# Patient Record
Sex: MALE | Employment: UNEMPLOYED | URBAN - METROPOLITAN AREA
[De-identification: names, ages, dates, MRNs, and addresses within clinical notes are randomized per-mention and may not be internally consistent; named-entity substitution may affect disease eponyms.]

---

## 2021-01-01 ENCOUNTER — APPOINTMENT (OUTPATIENT)
Dept: RADIOLOGY | Facility: MEDICAL CENTER | Age: 41
DRG: 853 | End: 2021-01-01
Attending: INTERNAL MEDICINE
Payer: MEDICAID

## 2021-01-01 ENCOUNTER — APPOINTMENT (OUTPATIENT)
Dept: RADIOLOGY | Facility: MEDICAL CENTER | Age: 41
DRG: 853 | End: 2021-01-01
Attending: STUDENT IN AN ORGANIZED HEALTH CARE EDUCATION/TRAINING PROGRAM
Payer: MEDICAID

## 2021-01-01 ENCOUNTER — ANCILLARY PROCEDURE (OUTPATIENT)
Dept: CARDIAC CATH/INVASIVE PROCEDURES | Facility: MEDICAL CENTER | Age: 41
DRG: 853 | End: 2021-01-01
Attending: INTERNAL MEDICINE
Payer: MEDICAID

## 2021-01-01 ENCOUNTER — HOSPITAL ENCOUNTER (INPATIENT)
Facility: MEDICAL CENTER | Age: 41
LOS: 15 days | DRG: 853 | End: 2021-05-02
Attending: EMERGENCY MEDICINE | Admitting: EMERGENCY MEDICINE
Payer: MEDICAID

## 2021-01-01 VITALS
DIASTOLIC BLOOD PRESSURE: 47 MMHG | WEIGHT: 292.99 LBS | RESPIRATION RATE: 20 BRPM | HEART RATE: 76 BPM | BODY MASS INDEX: 41.95 KG/M2 | HEIGHT: 70 IN | SYSTOLIC BLOOD PRESSURE: 106 MMHG | OXYGEN SATURATION: 57 % | TEMPERATURE: 98.2 F

## 2021-01-01 DIAGNOSIS — J96.01 ACUTE RESPIRATORY FAILURE WITH HYPOXIA (HCC): ICD-10-CM

## 2021-01-01 DIAGNOSIS — J69.0 ASPIRATION PNEUMONITIS (HCC): ICD-10-CM

## 2021-01-01 DIAGNOSIS — K70.30 ALCOHOLIC CIRRHOSIS OF LIVER WITHOUT ASCITES (HCC): ICD-10-CM

## 2021-01-01 DIAGNOSIS — A41.9 SEPTIC SHOCK (HCC): ICD-10-CM

## 2021-01-01 DIAGNOSIS — K70.10 ALCOHOLIC HEPATITIS WITHOUT ASCITES: ICD-10-CM

## 2021-01-01 DIAGNOSIS — R65.21 SEPTIC SHOCK (HCC): ICD-10-CM

## 2021-01-01 LAB
ABO + RH BLD: NORMAL
ABO GROUP BLD: NORMAL
ALBUMIN SERPL BCP-MCNC: 2.6 G/DL (ref 3.2–4.9)
ALBUMIN SERPL BCP-MCNC: 2.7 G/DL (ref 3.2–4.9)
ALBUMIN SERPL BCP-MCNC: 2.8 G/DL (ref 3.2–4.9)
ALBUMIN SERPL BCP-MCNC: 2.8 G/DL (ref 3.2–4.9)
ALBUMIN SERPL BCP-MCNC: 2.9 G/DL (ref 3.2–4.9)
ALBUMIN SERPL BCP-MCNC: 3 G/DL (ref 3.2–4.9)
ALBUMIN SERPL BCP-MCNC: 3 G/DL (ref 3.2–4.9)
ALBUMIN SERPL BCP-MCNC: 3.1 G/DL (ref 3.2–4.9)
ALBUMIN SERPL BCP-MCNC: 3.1 G/DL (ref 3.2–4.9)
ALBUMIN SERPL BCP-MCNC: 3.4 G/DL (ref 3.2–4.9)
ALBUMIN/GLOB SERPL: 0.8 G/DL
ALBUMIN/GLOB SERPL: 0.8 G/DL
ALBUMIN/GLOB SERPL: 0.9 G/DL
ALBUMIN/GLOB SERPL: 1 G/DL
ALBUMIN/GLOB SERPL: 1.1 G/DL
ALBUMIN/GLOB SERPL: 1.2 G/DL
ALBUMIN/GLOB SERPL: 1.3 G/DL
ALP SERPL-CCNC: 106 U/L (ref 30–99)
ALP SERPL-CCNC: 123 U/L (ref 30–99)
ALP SERPL-CCNC: 140 U/L (ref 30–99)
ALP SERPL-CCNC: 141 U/L (ref 30–99)
ALP SERPL-CCNC: 148 U/L (ref 30–99)
ALP SERPL-CCNC: 150 U/L (ref 30–99)
ALP SERPL-CCNC: 171 U/L (ref 30–99)
ALP SERPL-CCNC: 172 U/L (ref 30–99)
ALP SERPL-CCNC: 188 U/L (ref 30–99)
ALP SERPL-CCNC: 198 U/L (ref 30–99)
ALP SERPL-CCNC: 217 U/L (ref 30–99)
ALP SERPL-CCNC: 72 U/L (ref 30–99)
ALP SERPL-CCNC: 78 U/L (ref 30–99)
ALP SERPL-CCNC: 90 U/L (ref 30–99)
ALP SERPL-CCNC: 96 U/L (ref 30–99)
ALT SERPL-CCNC: 18 U/L (ref 2–50)
ALT SERPL-CCNC: 19 U/L (ref 2–50)
ALT SERPL-CCNC: 23 U/L (ref 2–50)
ALT SERPL-CCNC: 24 U/L (ref 2–50)
ALT SERPL-CCNC: 33 U/L (ref 2–50)
ALT SERPL-CCNC: 38 U/L (ref 2–50)
ALT SERPL-CCNC: 40 U/L (ref 2–50)
ALT SERPL-CCNC: 40 U/L (ref 2–50)
ALT SERPL-CCNC: 44 U/L (ref 2–50)
ALT SERPL-CCNC: 54 U/L (ref 2–50)
ALT SERPL-CCNC: 65 U/L (ref 2–50)
ALT SERPL-CCNC: 70 U/L (ref 2–50)
ALT SERPL-CCNC: 71 U/L (ref 2–50)
ALT SERPL-CCNC: 73 U/L (ref 2–50)
ALT SERPL-CCNC: 77 U/L (ref 2–50)
AMMONIA PLAS-SCNC: 66 UMOL/L (ref 11–45)
AMMONIA PLAS-SCNC: 78 UMOL/L (ref 11–45)
AMORPH CRY #/AREA URNS HPF: PRESENT /HPF
ANION GAP SERPL CALC-SCNC: 10 MMOL/L (ref 7–16)
ANION GAP SERPL CALC-SCNC: 11 MMOL/L (ref 7–16)
ANION GAP SERPL CALC-SCNC: 11 MMOL/L (ref 7–16)
ANION GAP SERPL CALC-SCNC: 12 MMOL/L (ref 7–16)
ANION GAP SERPL CALC-SCNC: 12 MMOL/L (ref 7–16)
ANION GAP SERPL CALC-SCNC: 14 MMOL/L (ref 7–16)
ANION GAP SERPL CALC-SCNC: 15 MMOL/L (ref 7–16)
ANION GAP SERPL CALC-SCNC: 16 MMOL/L (ref 7–16)
ANION GAP SERPL CALC-SCNC: 17 MMOL/L (ref 7–16)
ANION GAP SERPL CALC-SCNC: 18 MMOL/L (ref 7–16)
ANION GAP SERPL CALC-SCNC: 19 MMOL/L (ref 7–16)
ANION GAP SERPL CALC-SCNC: 20 MMOL/L (ref 7–16)
ANION GAP SERPL CALC-SCNC: 20 MMOL/L (ref 7–16)
ANION GAP SERPL CALC-SCNC: 23 MMOL/L (ref 7–16)
ANION GAP SERPL CALC-SCNC: 23 MMOL/L (ref 7–16)
ANION GAP SERPL CALC-SCNC: 26 MMOL/L (ref 7–16)
ANION GAP SERPL CALC-SCNC: 6 MMOL/L (ref 7–16)
ANION GAP SERPL CALC-SCNC: 8 MMOL/L (ref 7–16)
ANION GAP SERPL CALC-SCNC: 9 MMOL/L (ref 7–16)
ANISOCYTOSIS BLD QL SMEAR: ABNORMAL
APPEARANCE UR: CLEAR
APPEARANCE UR: CLEAR
AST SERPL-CCNC: 102 U/L (ref 12–45)
AST SERPL-CCNC: 104 U/L (ref 12–45)
AST SERPL-CCNC: 119 U/L (ref 12–45)
AST SERPL-CCNC: 121 U/L (ref 12–45)
AST SERPL-CCNC: 141 U/L (ref 12–45)
AST SERPL-CCNC: 142 U/L (ref 12–45)
AST SERPL-CCNC: 145 U/L (ref 12–45)
AST SERPL-CCNC: 165 U/L (ref 12–45)
AST SERPL-CCNC: 167 U/L (ref 12–45)
AST SERPL-CCNC: 176 U/L (ref 12–45)
AST SERPL-CCNC: 178 U/L (ref 12–45)
AST SERPL-CCNC: 184 U/L (ref 12–45)
AST SERPL-CCNC: 209 U/L (ref 12–45)
AST SERPL-CCNC: 233 U/L (ref 12–45)
AST SERPL-CCNC: 243 U/L (ref 12–45)
BACTERIA #/AREA URNS HPF: NEGATIVE /HPF
BACTERIA BLD CULT: NORMAL
BACTERIA SPEC RESP CULT: NORMAL
BARCODED ABORH UBTYP: 5100
BARCODED ABORH UBTYP: 600
BARCODED ABORH UBTYP: 6200
BARCODED ABORH UBTYP: 7300
BARCODED ABORH UBTYP: 8400
BARCODED ABORH UBTYP: 8400
BARCODED PRD CODE UBPRD: NORMAL
BARCODED UNIT NUM UBUNT: NORMAL
BASE EXCESS BLDA CALC-SCNC: -3 MMOL/L (ref -4–3)
BASE EXCESS BLDA CALC-SCNC: -6 MMOL/L (ref -4–3)
BASE EXCESS BLDA CALC-SCNC: -7 MMOL/L (ref -4–3)
BASE EXCESS BLDA CALC-SCNC: -8 MMOL/L (ref -4–3)
BASE EXCESS BLDA CALC-SCNC: 11 MMOL/L (ref -4–3)
BASE EXCESS BLDA CALC-SCNC: 12 MMOL/L (ref -4–3)
BASE EXCESS BLDA CALC-SCNC: 13 MMOL/L (ref -4–3)
BASE EXCESS BLDA CALC-SCNC: 14 MMOL/L (ref -4–3)
BASE EXCESS BLDA CALC-SCNC: 3 MMOL/L (ref -4–3)
BASE EXCESS BLDA CALC-SCNC: 6 MMOL/L (ref -4–3)
BASE EXCESS BLDA CALC-SCNC: 7 MMOL/L (ref -4–3)
BASE EXCESS BLDA CALC-SCNC: 8 MMOL/L (ref -4–3)
BASOPHILS # BLD AUTO: 0 % (ref 0–1.8)
BASOPHILS # BLD AUTO: 0.1 % (ref 0–1.8)
BASOPHILS # BLD AUTO: 0.2 % (ref 0–1.8)
BASOPHILS # BLD AUTO: 0.3 % (ref 0–1.8)
BASOPHILS # BLD AUTO: 0.3 % (ref 0–1.8)
BASOPHILS # BLD AUTO: 0.4 % (ref 0–1.8)
BASOPHILS # BLD AUTO: 0.9 % (ref 0–1.8)
BASOPHILS # BLD AUTO: 0.9 % (ref 0–1.8)
BASOPHILS # BLD AUTO: 3.5 % (ref 0–1.8)
BASOPHILS # BLD: 0 K/UL (ref 0–0.12)
BASOPHILS # BLD: 0.01 K/UL (ref 0–0.12)
BASOPHILS # BLD: 0.01 K/UL (ref 0–0.12)
BASOPHILS # BLD: 0.02 K/UL (ref 0–0.12)
BASOPHILS # BLD: 0.03 K/UL (ref 0–0.12)
BASOPHILS # BLD: 0.03 K/UL (ref 0–0.12)
BASOPHILS # BLD: 0.05 K/UL (ref 0–0.12)
BASOPHILS # BLD: 0.06 K/UL (ref 0–0.12)
BASOPHILS # BLD: 0.07 K/UL (ref 0–0.12)
BASOPHILS # BLD: 0.25 K/UL (ref 0–0.12)
BASOPHILS # BLD: 0.26 K/UL (ref 0–0.12)
BASOPHILS # BLD: 1.42 K/UL (ref 0–0.12)
BILIRUB SERPL-MCNC: 12.3 MG/DL (ref 0.1–1.5)
BILIRUB SERPL-MCNC: 12.6 MG/DL (ref 0.1–1.5)
BILIRUB SERPL-MCNC: 13.2 MG/DL (ref 0.1–1.5)
BILIRUB SERPL-MCNC: 13.5 MG/DL (ref 0.1–1.5)
BILIRUB SERPL-MCNC: 13.7 MG/DL (ref 0.1–1.5)
BILIRUB SERPL-MCNC: 13.9 MG/DL (ref 0.1–1.5)
BILIRUB SERPL-MCNC: 13.9 MG/DL (ref 0.1–1.5)
BILIRUB SERPL-MCNC: 14 MG/DL (ref 0.1–1.5)
BILIRUB SERPL-MCNC: 14 MG/DL (ref 0.1–1.5)
BILIRUB SERPL-MCNC: 14.2 MG/DL (ref 0.1–1.5)
BILIRUB SERPL-MCNC: 14.4 MG/DL (ref 0.1–1.5)
BILIRUB SERPL-MCNC: 14.6 MG/DL (ref 0.1–1.5)
BILIRUB SERPL-MCNC: 16.5 MG/DL (ref 0.1–1.5)
BILIRUB SERPL-MCNC: 17.6 MG/DL (ref 0.1–1.5)
BILIRUB SERPL-MCNC: 19.7 MG/DL (ref 0.1–1.5)
BILIRUB UR QL STRIP.AUTO: ABNORMAL
BILIRUB UR QL STRIP.AUTO: ABNORMAL
BLD GP AB SCN SERPL QL: NORMAL
BODY TEMPERATURE: ABNORMAL DEGREES
BREATHS SETTING VENT: 20
BREATHS SETTING VENT: 24
BREATHS SETTING VENT: 24
BREATHS SETTING VENT: 26
BREATHS SETTING VENT: 30
BUN SERPL-MCNC: 11 MG/DL (ref 8–22)
BUN SERPL-MCNC: 17 MG/DL (ref 8–22)
BUN SERPL-MCNC: 17 MG/DL (ref 8–22)
BUN SERPL-MCNC: 19 MG/DL (ref 8–22)
BUN SERPL-MCNC: 26 MG/DL (ref 8–22)
BUN SERPL-MCNC: 29 MG/DL (ref 8–22)
BUN SERPL-MCNC: 29 MG/DL (ref 8–22)
BUN SERPL-MCNC: 31 MG/DL (ref 8–22)
BUN SERPL-MCNC: 34 MG/DL (ref 8–22)
BUN SERPL-MCNC: 42 MG/DL (ref 8–22)
BUN SERPL-MCNC: 44 MG/DL (ref 8–22)
BUN SERPL-MCNC: 46 MG/DL (ref 8–22)
BUN SERPL-MCNC: 55 MG/DL (ref 8–22)
BUN SERPL-MCNC: 59 MG/DL (ref 8–22)
BUN SERPL-MCNC: 64 MG/DL (ref 8–22)
BUN SERPL-MCNC: 65 MG/DL (ref 8–22)
BUN SERPL-MCNC: 66 MG/DL (ref 8–22)
BUN SERPL-MCNC: 67 MG/DL (ref 8–22)
BUN SERPL-MCNC: 69 MG/DL (ref 8–22)
BUN SERPL-MCNC: 7 MG/DL (ref 8–22)
BUN SERPL-MCNC: 71 MG/DL (ref 8–22)
BUN SERPL-MCNC: 71 MG/DL (ref 8–22)
BUN SERPL-MCNC: 73 MG/DL (ref 8–22)
BUN SERPL-MCNC: 82 MG/DL (ref 8–22)
BUN SERPL-MCNC: 85 MG/DL (ref 8–22)
BUN SERPL-MCNC: 85 MG/DL (ref 8–22)
BUN SERPL-MCNC: 92 MG/DL (ref 8–22)
BUN SERPL-MCNC: 95 MG/DL (ref 8–22)
BURR CELLS BLD QL SMEAR: NORMAL
C DIFF DNA SPEC QL NAA+PROBE: NEGATIVE
C DIFF TOX GENS STL QL NAA+PROBE: NEGATIVE
CA-I SERPL-SCNC: 0.9 MMOL/L (ref 1.1–1.3)
CA-I SERPL-SCNC: 0.9 MMOL/L (ref 1.1–1.3)
CALCIUM SERPL-MCNC: 7.1 MG/DL (ref 8.5–10.5)
CALCIUM SERPL-MCNC: 7.3 MG/DL (ref 8.5–10.5)
CALCIUM SERPL-MCNC: 7.4 MG/DL (ref 8.5–10.5)
CALCIUM SERPL-MCNC: 7.5 MG/DL (ref 8.5–10.5)
CALCIUM SERPL-MCNC: 7.6 MG/DL (ref 8.5–10.5)
CALCIUM SERPL-MCNC: 7.6 MG/DL (ref 8.5–10.5)
CALCIUM SERPL-MCNC: 7.7 MG/DL (ref 8.5–10.5)
CALCIUM SERPL-MCNC: 7.9 MG/DL (ref 8.5–10.5)
CALCIUM SERPL-MCNC: 8 MG/DL (ref 8.5–10.5)
CALCIUM SERPL-MCNC: 8.1 MG/DL (ref 8.5–10.5)
CALCIUM SERPL-MCNC: 8.2 MG/DL (ref 8.5–10.5)
CALCIUM SERPL-MCNC: 8.2 MG/DL (ref 8.5–10.5)
CALCIUM SERPL-MCNC: 8.3 MG/DL (ref 8.5–10.5)
CALCIUM SERPL-MCNC: 8.5 MG/DL (ref 8.5–10.5)
CALCIUM SERPL-MCNC: 8.7 MG/DL (ref 8.5–10.5)
CALCIUM SERPL-MCNC: 8.8 MG/DL (ref 8.5–10.5)
CALCIUM SERPL-MCNC: 8.9 MG/DL (ref 8.5–10.5)
CALCIUM SERPL-MCNC: 9.1 MG/DL (ref 8.5–10.5)
CC # CATH TIP CULT: NORMAL /ML
CFT BLD TEG: 12.4 MIN (ref 5–10)
CFT BLD TEG: 12.4 MIN (ref 5–10)
CFT BLD TEG: 15.4 MIN (ref 5–10)
CFT BLD TEG: 15.5 MIN (ref 5–10)
CFT BLD TEG: 8.5 MIN (ref 5–10)
CHLORIDE SERPL-SCNC: 100 MMOL/L (ref 96–112)
CHLORIDE SERPL-SCNC: 101 MMOL/L (ref 96–112)
CHLORIDE SERPL-SCNC: 102 MMOL/L (ref 96–112)
CHLORIDE SERPL-SCNC: 103 MMOL/L (ref 96–112)
CHLORIDE SERPL-SCNC: 104 MMOL/L (ref 96–112)
CHLORIDE SERPL-SCNC: 106 MMOL/L (ref 96–112)
CHLORIDE SERPL-SCNC: 106 MMOL/L (ref 96–112)
CHLORIDE SERPL-SCNC: 108 MMOL/L (ref 96–112)
CHLORIDE SERPL-SCNC: 109 MMOL/L (ref 96–112)
CHLORIDE SERPL-SCNC: 110 MMOL/L (ref 96–112)
CHLORIDE SERPL-SCNC: 111 MMOL/L (ref 96–112)
CHLORIDE SERPL-SCNC: 115 MMOL/L (ref 96–112)
CHLORIDE SERPL-SCNC: 115 MMOL/L (ref 96–112)
CHLORIDE SERPL-SCNC: 116 MMOL/L (ref 96–112)
CHLORIDE SERPL-SCNC: 116 MMOL/L (ref 96–112)
CHLORIDE SERPL-SCNC: 119 MMOL/L (ref 96–112)
CHLORIDE SERPL-SCNC: 119 MMOL/L (ref 96–112)
CHLORIDE SERPL-SCNC: 122 MMOL/L (ref 96–112)
CHLORIDE SERPL-SCNC: 85 MMOL/L (ref 96–112)
CHLORIDE SERPL-SCNC: 89 MMOL/L (ref 96–112)
CHLORIDE SERPL-SCNC: 90 MMOL/L (ref 96–112)
CHLORIDE SERPL-SCNC: 91 MMOL/L (ref 96–112)
CHLORIDE SERPL-SCNC: 92 MMOL/L (ref 96–112)
CHLORIDE SERPL-SCNC: 96 MMOL/L (ref 96–112)
CHLORIDE SERPL-SCNC: 98 MMOL/L (ref 96–112)
CHLORIDE SERPL-SCNC: 99 MMOL/L (ref 96–112)
CHLORIDE SERPL-SCNC: 99 MMOL/L (ref 96–112)
CK SERPL-CCNC: 65 U/L (ref 0–154)
CLOT ANGLE BLD TEG: 63.6 DEGREES (ref 53–72)
CLOT ANGLE BLD TEG: 64.2 DEGREES (ref 53–72)
CLOT ANGLE BLD TEG: 64.9 DEGREES (ref 53–72)
CLOT ANGLE BLD TEG: 65.3 DEGREES (ref 53–72)
CLOT ANGLE BLD TEG: 70.8 DEGREES (ref 53–72)
CLOT LYSIS 30M P MA LENFR BLD TEG: 0 % (ref 0–8)
CLOT LYSIS 30M P MA LENFR BLD TEG: 0.1 % (ref 0–8)
CO2 BLDA-SCNC: 18 MMOL/L (ref 20–33)
CO2 BLDA-SCNC: 19 MMOL/L (ref 20–33)
CO2 BLDA-SCNC: 21 MMOL/L (ref 20–33)
CO2 BLDA-SCNC: 26 MMOL/L (ref 20–33)
CO2 BLDA-SCNC: 30 MMOL/L (ref 20–33)
CO2 BLDA-SCNC: 32 MMOL/L (ref 20–33)
CO2 BLDA-SCNC: 32 MMOL/L (ref 20–33)
CO2 BLDA-SCNC: 35 MMOL/L (ref 20–33)
CO2 BLDA-SCNC: 36 MMOL/L (ref 20–33)
CO2 BLDA-SCNC: 37 MMOL/L (ref 20–33)
CO2 BLDA-SCNC: 39 MMOL/L (ref 20–33)
CO2 BLDA-SCNC: 39 MMOL/L (ref 20–33)
CO2 SERPL-SCNC: 15 MMOL/L (ref 20–33)
CO2 SERPL-SCNC: 15 MMOL/L (ref 20–33)
CO2 SERPL-SCNC: 18 MMOL/L (ref 20–33)
CO2 SERPL-SCNC: 19 MMOL/L (ref 20–33)
CO2 SERPL-SCNC: 20 MMOL/L (ref 20–33)
CO2 SERPL-SCNC: 21 MMOL/L (ref 20–33)
CO2 SERPL-SCNC: 22 MMOL/L (ref 20–33)
CO2 SERPL-SCNC: 24 MMOL/L (ref 20–33)
CO2 SERPL-SCNC: 24 MMOL/L (ref 20–33)
CO2 SERPL-SCNC: 27 MMOL/L (ref 20–33)
CO2 SERPL-SCNC: 28 MMOL/L (ref 20–33)
CO2 SERPL-SCNC: 29 MMOL/L (ref 20–33)
CO2 SERPL-SCNC: 29 MMOL/L (ref 20–33)
CO2 SERPL-SCNC: 30 MMOL/L (ref 20–33)
CO2 SERPL-SCNC: 31 MMOL/L (ref 20–33)
CO2 SERPL-SCNC: 32 MMOL/L (ref 20–33)
CO2 SERPL-SCNC: 32 MMOL/L (ref 20–33)
CO2 SERPL-SCNC: 33 MMOL/L (ref 20–33)
CO2 SERPL-SCNC: 34 MMOL/L (ref 20–33)
CO2 SERPL-SCNC: 37 MMOL/L (ref 20–33)
CO2 SERPL-SCNC: 38 MMOL/L (ref 20–33)
COLOR UR: ABNORMAL
COLOR UR: YELLOW
COMMENT 1642: NORMAL
COMPONENT F 8504F: NORMAL
COMPONENT R 8504R: NORMAL
CREAT SERPL-MCNC: 0.35 MG/DL (ref 0.5–1.4)
CREAT SERPL-MCNC: 0.39 MG/DL (ref 0.5–1.4)
CREAT SERPL-MCNC: 0.4 MG/DL (ref 0.5–1.4)
CREAT SERPL-MCNC: 0.48 MG/DL (ref 0.5–1.4)
CREAT SERPL-MCNC: 0.5 MG/DL (ref 0.5–1.4)
CREAT SERPL-MCNC: 0.53 MG/DL (ref 0.5–1.4)
CREAT SERPL-MCNC: 0.56 MG/DL (ref 0.5–1.4)
CREAT SERPL-MCNC: 0.64 MG/DL (ref 0.5–1.4)
CREAT SERPL-MCNC: 0.78 MG/DL (ref 0.5–1.4)
CREAT SERPL-MCNC: 0.89 MG/DL (ref 0.5–1.4)
CREAT SERPL-MCNC: 1.02 MG/DL (ref 0.5–1.4)
CREAT SERPL-MCNC: 1.21 MG/DL (ref 0.5–1.4)
CREAT SERPL-MCNC: 1.32 MG/DL (ref 0.5–1.4)
CREAT SERPL-MCNC: 1.55 MG/DL (ref 0.5–1.4)
CREAT SERPL-MCNC: 1.86 MG/DL (ref 0.5–1.4)
CREAT SERPL-MCNC: 2.39 MG/DL (ref 0.5–1.4)
CREAT SERPL-MCNC: 2.66 MG/DL (ref 0.5–1.4)
CREAT SERPL-MCNC: 3.35 MG/DL (ref 0.5–1.4)
CREAT SERPL-MCNC: 3.78 MG/DL (ref 0.5–1.4)
CREAT SERPL-MCNC: 3.79 MG/DL (ref 0.5–1.4)
CREAT SERPL-MCNC: 4.39 MG/DL (ref 0.5–1.4)
CREAT SERPL-MCNC: 5.05 MG/DL (ref 0.5–1.4)
CREAT SERPL-MCNC: 5.46 MG/DL (ref 0.5–1.4)
CREAT SERPL-MCNC: 5.62 MG/DL (ref 0.5–1.4)
CREAT SERPL-MCNC: 5.88 MG/DL (ref 0.5–1.4)
CREAT SERPL-MCNC: 6.57 MG/DL (ref 0.5–1.4)
CREAT SERPL-MCNC: 6.95 MG/DL (ref 0.5–1.4)
CREAT SERPL-MCNC: 7.04 MG/DL (ref 0.5–1.4)
CREAT SERPL-MCNC: 7.08 MG/DL (ref 0.5–1.4)
CREAT SERPL-MCNC: 7.93 MG/DL (ref 0.5–1.4)
CREAT UR-MCNC: 127.92 MG/DL
CRP SERPL HS-MCNC: 17.4 MG/DL (ref 0–0.75)
CRP SERPL HS-MCNC: 9.38 MG/DL (ref 0–0.75)
CT.EXTRINSIC BLD ROTEM: 1.4 MIN (ref 1–3)
CT.EXTRINSIC BLD ROTEM: 1.9 MIN (ref 1–3)
CT.EXTRINSIC BLD ROTEM: 1.9 MIN (ref 1–3)
CT.EXTRINSIC BLD ROTEM: 2.4 MIN (ref 1–3)
CT.EXTRINSIC BLD ROTEM: 2.4 MIN (ref 1–3)
DACRYOCYTES BLD QL SMEAR: NORMAL
DELSYS IDSYS: ABNORMAL
EKG IMPRESSION: NORMAL
END TIDAL CARBON DIOXIDE IECO2: 31 MMHG
END TIDAL CARBON DIOXIDE IECO2: 32 MMHG
END TIDAL CARBON DIOXIDE IECO2: 32 MMHG
END TIDAL CARBON DIOXIDE IECO2: 36 MMHG
END TIDAL CARBON DIOXIDE IECO2: 36 MMHG
END TIDAL CARBON DIOXIDE IECO2: 38 MMHG
END TIDAL CARBON DIOXIDE IECO2: 39 MMHG
END TIDAL CARBON DIOXIDE IECO2: 40 MMHG
END TIDAL CARBON DIOXIDE IECO2: 43 MMHG
END TIDAL CARBON DIOXIDE IECO2: 46 MMHG
END TIDAL CARBON DIOXIDE IECO2: 50 MMHG
EOSINOPHIL # BLD AUTO: 0 K/UL (ref 0–0.51)
EOSINOPHIL # BLD AUTO: 0.01 K/UL (ref 0–0.51)
EOSINOPHIL # BLD AUTO: 0.01 K/UL (ref 0–0.51)
EOSINOPHIL # BLD AUTO: 0.05 K/UL (ref 0–0.51)
EOSINOPHIL # BLD AUTO: 0.19 K/UL (ref 0–0.51)
EOSINOPHIL # BLD AUTO: 0.23 K/UL (ref 0–0.51)
EOSINOPHIL # BLD AUTO: 0.25 K/UL (ref 0–0.51)
EOSINOPHIL # BLD AUTO: 0.28 K/UL (ref 0–0.51)
EOSINOPHIL # BLD AUTO: 0.37 K/UL (ref 0–0.51)
EOSINOPHIL NFR BLD: 0 % (ref 0–6.9)
EOSINOPHIL NFR BLD: 0.1 % (ref 0–6.9)
EOSINOPHIL NFR BLD: 0.3 % (ref 0–6.9)
EOSINOPHIL NFR BLD: 0.8 % (ref 0–6.9)
EOSINOPHIL NFR BLD: 0.9 % (ref 0–6.9)
EPI CELLS #/AREA URNS HPF: ABNORMAL /HPF
ERYTHROCYTE [DISTWIDTH] IN BLOOD BY AUTOMATED COUNT: 56.2 FL (ref 35.9–50)
ERYTHROCYTE [DISTWIDTH] IN BLOOD BY AUTOMATED COUNT: 57.3 FL (ref 35.9–50)
ERYTHROCYTE [DISTWIDTH] IN BLOOD BY AUTOMATED COUNT: 57.4 FL (ref 35.9–50)
ERYTHROCYTE [DISTWIDTH] IN BLOOD BY AUTOMATED COUNT: 58.8 FL (ref 35.9–50)
ERYTHROCYTE [DISTWIDTH] IN BLOOD BY AUTOMATED COUNT: 67.7 FL (ref 35.9–50)
ERYTHROCYTE [DISTWIDTH] IN BLOOD BY AUTOMATED COUNT: 74.9 FL (ref 35.9–50)
ERYTHROCYTE [DISTWIDTH] IN BLOOD BY AUTOMATED COUNT: 75.7 FL (ref 35.9–50)
ERYTHROCYTE [DISTWIDTH] IN BLOOD BY AUTOMATED COUNT: 77.5 FL (ref 35.9–50)
ERYTHROCYTE [DISTWIDTH] IN BLOOD BY AUTOMATED COUNT: 78.5 FL (ref 35.9–50)
ERYTHROCYTE [DISTWIDTH] IN BLOOD BY AUTOMATED COUNT: 79.2 FL (ref 35.9–50)
ERYTHROCYTE [DISTWIDTH] IN BLOOD BY AUTOMATED COUNT: 80 FL (ref 35.9–50)
ERYTHROCYTE [DISTWIDTH] IN BLOOD BY AUTOMATED COUNT: 80.5 FL (ref 35.9–50)
ERYTHROCYTE [DISTWIDTH] IN BLOOD BY AUTOMATED COUNT: 81.4 FL (ref 35.9–50)
ERYTHROCYTE [DISTWIDTH] IN BLOOD BY AUTOMATED COUNT: 82.3 FL (ref 35.9–50)
ERYTHROCYTE [DISTWIDTH] IN BLOOD BY AUTOMATED COUNT: 83 FL (ref 35.9–50)
ERYTHROCYTE [DISTWIDTH] IN BLOOD BY AUTOMATED COUNT: 85.9 FL (ref 35.9–50)
ERYTHROCYTE [DISTWIDTH] IN BLOOD BY AUTOMATED COUNT: 88.3 FL (ref 35.9–50)
ERYTHROCYTE [DISTWIDTH] IN BLOOD BY AUTOMATED COUNT: 89.2 FL (ref 35.9–50)
ERYTHROCYTE [DISTWIDTH] IN BLOOD BY AUTOMATED COUNT: 89.6 FL (ref 35.9–50)
ERYTHROCYTE [DISTWIDTH] IN BLOOD BY AUTOMATED COUNT: 91.3 FL (ref 35.9–50)
ERYTHROCYTE [DISTWIDTH] IN BLOOD BY AUTOMATED COUNT: 93.8 FL (ref 35.9–50)
ERYTHROCYTE [DISTWIDTH] IN BLOOD BY AUTOMATED COUNT: 94.1 FL (ref 35.9–50)
FOLATE SERPL-MCNC: <2 NG/ML
FUNGUS SPEC FUNGUS STN: NORMAL
GLOBULIN SER CALC-MCNC: 2.5 G/DL (ref 1.9–3.5)
GLOBULIN SER CALC-MCNC: 2.7 G/DL (ref 1.9–3.5)
GLOBULIN SER CALC-MCNC: 2.8 G/DL (ref 1.9–3.5)
GLOBULIN SER CALC-MCNC: 2.9 G/DL (ref 1.9–3.5)
GLOBULIN SER CALC-MCNC: 2.9 G/DL (ref 1.9–3.5)
GLOBULIN SER CALC-MCNC: 3 G/DL (ref 1.9–3.5)
GLOBULIN SER CALC-MCNC: 3 G/DL (ref 1.9–3.5)
GLOBULIN SER CALC-MCNC: 3.1 G/DL (ref 1.9–3.5)
GLOBULIN SER CALC-MCNC: 3.1 G/DL (ref 1.9–3.5)
GLOBULIN SER CALC-MCNC: 3.2 G/DL (ref 1.9–3.5)
GLOBULIN SER CALC-MCNC: 3.2 G/DL (ref 1.9–3.5)
GLOBULIN SER CALC-MCNC: 3.3 G/DL (ref 1.9–3.5)
GLOBULIN SER CALC-MCNC: 3.4 G/DL (ref 1.9–3.5)
GLUCOSE BLD-MCNC: 111 MG/DL (ref 65–99)
GLUCOSE BLD-MCNC: 129 MG/DL (ref 65–99)
GLUCOSE BLD-MCNC: 129 MG/DL (ref 65–99)
GLUCOSE BLD-MCNC: 130 MG/DL (ref 65–99)
GLUCOSE BLD-MCNC: 134 MG/DL (ref 65–99)
GLUCOSE BLD-MCNC: 137 MG/DL (ref 65–99)
GLUCOSE BLD-MCNC: 137 MG/DL (ref 65–99)
GLUCOSE BLD-MCNC: 147 MG/DL (ref 65–99)
GLUCOSE BLD-MCNC: 150 MG/DL (ref 65–99)
GLUCOSE BLD-MCNC: 152 MG/DL (ref 65–99)
GLUCOSE BLD-MCNC: 153 MG/DL (ref 65–99)
GLUCOSE BLD-MCNC: 154 MG/DL (ref 65–99)
GLUCOSE BLD-MCNC: 157 MG/DL (ref 65–99)
GLUCOSE BLD-MCNC: 157 MG/DL (ref 65–99)
GLUCOSE BLD-MCNC: 159 MG/DL (ref 65–99)
GLUCOSE BLD-MCNC: 160 MG/DL (ref 65–99)
GLUCOSE BLD-MCNC: 160 MG/DL (ref 65–99)
GLUCOSE BLD-MCNC: 161 MG/DL (ref 65–99)
GLUCOSE BLD-MCNC: 167 MG/DL (ref 65–99)
GLUCOSE BLD-MCNC: 193 MG/DL (ref 65–99)
GLUCOSE BLD-MCNC: 237 MG/DL (ref 65–99)
GLUCOSE BLD-MCNC: 72 MG/DL (ref 65–99)
GLUCOSE SERPL-MCNC: 144 MG/DL (ref 65–99)
GLUCOSE SERPL-MCNC: 149 MG/DL (ref 65–99)
GLUCOSE SERPL-MCNC: 149 MG/DL (ref 65–99)
GLUCOSE SERPL-MCNC: 150 MG/DL (ref 65–99)
GLUCOSE SERPL-MCNC: 154 MG/DL (ref 65–99)
GLUCOSE SERPL-MCNC: 154 MG/DL (ref 65–99)
GLUCOSE SERPL-MCNC: 157 MG/DL (ref 65–99)
GLUCOSE SERPL-MCNC: 159 MG/DL (ref 65–99)
GLUCOSE SERPL-MCNC: 160 MG/DL (ref 65–99)
GLUCOSE SERPL-MCNC: 161 MG/DL (ref 65–99)
GLUCOSE SERPL-MCNC: 161 MG/DL (ref 65–99)
GLUCOSE SERPL-MCNC: 162 MG/DL (ref 65–99)
GLUCOSE SERPL-MCNC: 162 MG/DL (ref 65–99)
GLUCOSE SERPL-MCNC: 163 MG/DL (ref 65–99)
GLUCOSE SERPL-MCNC: 168 MG/DL (ref 65–99)
GLUCOSE SERPL-MCNC: 169 MG/DL (ref 65–99)
GLUCOSE SERPL-MCNC: 170 MG/DL (ref 65–99)
GLUCOSE SERPL-MCNC: 172 MG/DL (ref 65–99)
GLUCOSE SERPL-MCNC: 173 MG/DL (ref 65–99)
GLUCOSE SERPL-MCNC: 174 MG/DL (ref 65–99)
GLUCOSE SERPL-MCNC: 175 MG/DL (ref 65–99)
GLUCOSE SERPL-MCNC: 175 MG/DL (ref 65–99)
GLUCOSE SERPL-MCNC: 176 MG/DL (ref 65–99)
GLUCOSE SERPL-MCNC: 176 MG/DL (ref 65–99)
GLUCOSE SERPL-MCNC: 177 MG/DL (ref 65–99)
GLUCOSE SERPL-MCNC: 194 MG/DL (ref 65–99)
GLUCOSE SERPL-MCNC: 203 MG/DL (ref 65–99)
GLUCOSE SERPL-MCNC: 203 MG/DL (ref 65–99)
GLUCOSE SERPL-MCNC: 205 MG/DL (ref 65–99)
GLUCOSE SERPL-MCNC: 240 MG/DL (ref 65–99)
GLUCOSE UR STRIP.AUTO-MCNC: NEGATIVE MG/DL
GLUCOSE UR STRIP.AUTO-MCNC: NEGATIVE MG/DL
GRAM STN SPEC: NORMAL
HAV IGM SERPL QL IA: NORMAL
HBV CORE IGM SER QL: NORMAL
HBV SURFACE AG SER QL: NORMAL
HCO3 BLDA-SCNC: 17.4 MMOL/L (ref 17–25)
HCO3 BLDA-SCNC: 18 MMOL/L (ref 17–25)
HCO3 BLDA-SCNC: 19.4 MMOL/L (ref 17–25)
HCO3 BLDA-SCNC: 24.1 MMOL/L (ref 17–25)
HCO3 BLDA-SCNC: 28.8 MMOL/L (ref 17–25)
HCO3 BLDA-SCNC: 30.8 MMOL/L (ref 17–25)
HCO3 BLDA-SCNC: 31 MMOL/L (ref 17–25)
HCO3 BLDA-SCNC: 33.5 MMOL/L (ref 17–25)
HCO3 BLDA-SCNC: 35 MMOL/L (ref 17–25)
HCO3 BLDA-SCNC: 35.8 MMOL/L (ref 17–25)
HCO3 BLDA-SCNC: 37.4 MMOL/L (ref 17–25)
HCO3 BLDA-SCNC: 38 MMOL/L (ref 17–25)
HCT VFR BLD AUTO: 22.6 % (ref 42–52)
HCT VFR BLD AUTO: 23 % (ref 42–52)
HCT VFR BLD AUTO: 23.4 % (ref 42–52)
HCT VFR BLD AUTO: 23.4 % (ref 42–52)
HCT VFR BLD AUTO: 24.3 % (ref 42–52)
HCT VFR BLD AUTO: 24.4 % (ref 42–52)
HCT VFR BLD AUTO: 26.6 % (ref 42–52)
HCT VFR BLD AUTO: 27.1 % (ref 42–52)
HCT VFR BLD AUTO: 27.5 % (ref 42–52)
HCT VFR BLD AUTO: 27.6 % (ref 42–52)
HCT VFR BLD AUTO: 28.4 % (ref 42–52)
HCT VFR BLD AUTO: 28.5 % (ref 42–52)
HCT VFR BLD AUTO: 28.8 % (ref 42–52)
HCT VFR BLD AUTO: 29.3 % (ref 42–52)
HCT VFR BLD AUTO: 29.4 % (ref 42–52)
HCT VFR BLD AUTO: 29.6 % (ref 42–52)
HCT VFR BLD AUTO: 29.9 % (ref 42–52)
HCT VFR BLD AUTO: 30 % (ref 42–52)
HCT VFR BLD AUTO: 30.1 % (ref 42–52)
HCT VFR BLD AUTO: 30.3 % (ref 42–52)
HCV AB SER QL: NORMAL
HGB BLD-MCNC: 6.7 G/DL (ref 14–18)
HGB BLD-MCNC: 6.7 G/DL (ref 14–18)
HGB BLD-MCNC: 7.9 G/DL (ref 14–18)
HGB BLD-MCNC: 7.9 G/DL (ref 14–18)
HGB BLD-MCNC: 8.1 G/DL (ref 14–18)
HGB BLD-MCNC: 8.1 G/DL (ref 14–18)
HGB BLD-MCNC: 8.2 G/DL (ref 14–18)
HGB BLD-MCNC: 8.2 G/DL (ref 14–18)
HGB BLD-MCNC: 8.5 G/DL (ref 14–18)
HGB BLD-MCNC: 8.5 G/DL (ref 14–18)
HGB BLD-MCNC: 8.7 G/DL (ref 14–18)
HGB BLD-MCNC: 8.8 G/DL (ref 14–18)
HGB BLD-MCNC: 9 G/DL (ref 14–18)
HGB BLD-MCNC: 9.1 G/DL (ref 14–18)
HGB BLD-MCNC: 9.1 G/DL (ref 14–18)
HGB BLD-MCNC: 9.2 G/DL (ref 14–18)
HGB BLD-MCNC: 9.3 G/DL (ref 14–18)
HGB BLD-MCNC: 9.3 G/DL (ref 14–18)
HGB BLD-MCNC: 9.5 G/DL (ref 14–18)
HGB BLD-MCNC: 9.6 G/DL (ref 14–18)
HIV 1+2 AB+HIV1 P24 AG SERPL QL IA: NORMAL
HOROWITZ INDEX BLDA+IHG-RTO: 101 MM[HG]
HOROWITZ INDEX BLDA+IHG-RTO: 105 MM[HG]
HOROWITZ INDEX BLDA+IHG-RTO: 134 MM[HG]
HOROWITZ INDEX BLDA+IHG-RTO: 136 MM[HG]
HOROWITZ INDEX BLDA+IHG-RTO: 144 MM[HG]
HOROWITZ INDEX BLDA+IHG-RTO: 171 MM[HG]
HOROWITZ INDEX BLDA+IHG-RTO: 185 MM[HG]
HOROWITZ INDEX BLDA+IHG-RTO: 191 MM[HG]
HOROWITZ INDEX BLDA+IHG-RTO: 195 MM[HG]
HOROWITZ INDEX BLDA+IHG-RTO: 213 MM[HG]
HOROWITZ INDEX BLDA+IHG-RTO: 50 MM[HG]
HOROWITZ INDEX BLDA+IHG-RTO: 78 MM[HG]
HYALINE CASTS #/AREA URNS LPF: ABNORMAL /LPF
HYPOCHROMIA BLD QL SMEAR: ABNORMAL
IMM GRANULOCYTES # BLD AUTO: 0.15 K/UL (ref 0–0.11)
IMM GRANULOCYTES # BLD AUTO: 0.15 K/UL (ref 0–0.11)
IMM GRANULOCYTES # BLD AUTO: 0.2 K/UL (ref 0–0.11)
IMM GRANULOCYTES # BLD AUTO: 0.28 K/UL (ref 0–0.11)
IMM GRANULOCYTES # BLD AUTO: 0.34 K/UL (ref 0–0.11)
IMM GRANULOCYTES # BLD AUTO: 0.38 K/UL (ref 0–0.11)
IMM GRANULOCYTES # BLD AUTO: 0.47 K/UL (ref 0–0.11)
IMM GRANULOCYTES # BLD AUTO: 0.66 K/UL (ref 0–0.11)
IMM GRANULOCYTES NFR BLD AUTO: 1 % (ref 0–0.9)
IMM GRANULOCYTES NFR BLD AUTO: 1.1 % (ref 0–0.9)
IMM GRANULOCYTES NFR BLD AUTO: 1.3 % (ref 0–0.9)
IMM GRANULOCYTES NFR BLD AUTO: 1.8 % (ref 0–0.9)
IMM GRANULOCYTES NFR BLD AUTO: 1.8 % (ref 0–0.9)
IMM GRANULOCYTES NFR BLD AUTO: 1.9 % (ref 0–0.9)
IMM GRANULOCYTES NFR BLD AUTO: 2.4 % (ref 0–0.9)
IMM GRANULOCYTES NFR BLD AUTO: 3.8 % (ref 0–0.9)
INR PPP: 1.46 (ref 0.87–1.13)
INR PPP: 1.57 (ref 0.87–1.13)
INR PPP: 2 (ref 0.87–1.13)
INR PPP: 2 (ref 0.87–1.13)
KETONES UR STRIP.AUTO-MCNC: NEGATIVE MG/DL
KETONES UR STRIP.AUTO-MCNC: NEGATIVE MG/DL
LACTATE BLD-SCNC: 1.6 MMOL/L (ref 0.5–2)
LACTATE BLD-SCNC: 2.1 MMOL/L (ref 0.5–2)
LEUKOCYTE ESTERASE UR QL STRIP.AUTO: NEGATIVE
LEUKOCYTE ESTERASE UR QL STRIP.AUTO: NEGATIVE
LIPASE SERPL-CCNC: 412 U/L (ref 11–82)
LYMPHOCYTES # BLD AUTO: 0.28 K/UL (ref 1–4.8)
LYMPHOCYTES # BLD AUTO: 0.31 K/UL (ref 1–4.8)
LYMPHOCYTES # BLD AUTO: 0.78 K/UL (ref 1–4.8)
LYMPHOCYTES # BLD AUTO: 0.82 K/UL (ref 1–4.8)
LYMPHOCYTES # BLD AUTO: 0.91 K/UL (ref 1–4.8)
LYMPHOCYTES # BLD AUTO: 1.02 K/UL (ref 1–4.8)
LYMPHOCYTES # BLD AUTO: 1.14 K/UL (ref 1–4.8)
LYMPHOCYTES # BLD AUTO: 1.17 K/UL (ref 1–4.8)
LYMPHOCYTES # BLD AUTO: 1.38 K/UL (ref 1–4.8)
LYMPHOCYTES # BLD AUTO: 1.69 K/UL (ref 1–4.8)
LYMPHOCYTES # BLD AUTO: 1.75 K/UL (ref 1–4.8)
LYMPHOCYTES # BLD AUTO: 2.34 K/UL (ref 1–4.8)
LYMPHOCYTES # BLD AUTO: 2.59 K/UL (ref 1–4.8)
LYMPHOCYTES # BLD AUTO: 2.67 K/UL (ref 1–4.8)
LYMPHOCYTES # BLD AUTO: 3.09 K/UL (ref 1–4.8)
LYMPHOCYTES # BLD AUTO: 3.39 K/UL (ref 1–4.8)
LYMPHOCYTES NFR BLD: 1.6 % (ref 22–41)
LYMPHOCYTES NFR BLD: 1.7 % (ref 22–41)
LYMPHOCYTES NFR BLD: 11 % (ref 22–41)
LYMPHOCYTES NFR BLD: 12.2 % (ref 22–41)
LYMPHOCYTES NFR BLD: 15.6 % (ref 22–41)
LYMPHOCYTES NFR BLD: 3.4 % (ref 22–41)
LYMPHOCYTES NFR BLD: 4 % (ref 22–41)
LYMPHOCYTES NFR BLD: 5.9 % (ref 22–41)
LYMPHOCYTES NFR BLD: 5.9 % (ref 22–41)
LYMPHOCYTES NFR BLD: 6 % (ref 22–41)
LYMPHOCYTES NFR BLD: 6.1 % (ref 22–41)
LYMPHOCYTES NFR BLD: 7.3 % (ref 22–41)
LYMPHOCYTES NFR BLD: 7.6 % (ref 22–41)
LYMPHOCYTES NFR BLD: 8.3 % (ref 22–41)
LYMPHOCYTES NFR BLD: 9 % (ref 22–41)
LYMPHOCYTES NFR BLD: 9.3 % (ref 22–41)
MACROCYTES BLD QL SMEAR: ABNORMAL
MAGNESIUM SERPL-MCNC: 2.1 MG/DL (ref 1.5–2.5)
MAGNESIUM SERPL-MCNC: 2.1 MG/DL (ref 1.5–2.5)
MAGNESIUM SERPL-MCNC: 2.3 MG/DL (ref 1.5–2.5)
MAGNESIUM SERPL-MCNC: 2.6 MG/DL (ref 1.5–2.5)
MAGNESIUM SERPL-MCNC: 2.9 MG/DL (ref 1.5–2.5)
MAGNESIUM SERPL-MCNC: 2.9 MG/DL (ref 1.5–2.5)
MANUAL DIFF BLD: ABNORMAL
MANUAL DIFF BLD: NORMAL
MCF BLD TEG: 75.9 MM (ref 50–70)
MCF BLD TEG: 77 MM (ref 50–70)
MCF BLD TEG: 79 MM (ref 50–70)
MCF BLD TEG: 81 MM (ref 50–70)
MCF BLD TEG: 81.1 MM (ref 50–70)
MCH RBC QN AUTO: 32 PG (ref 27–33)
MCH RBC QN AUTO: 32.6 PG (ref 27–33)
MCH RBC QN AUTO: 32.6 PG (ref 27–33)
MCH RBC QN AUTO: 33 PG (ref 27–33)
MCH RBC QN AUTO: 33.2 PG (ref 27–33)
MCH RBC QN AUTO: 33.3 PG (ref 27–33)
MCH RBC QN AUTO: 33.3 PG (ref 27–33)
MCH RBC QN AUTO: 33.5 PG (ref 27–33)
MCH RBC QN AUTO: 33.6 PG (ref 27–33)
MCH RBC QN AUTO: 33.7 PG (ref 27–33)
MCH RBC QN AUTO: 33.7 PG (ref 27–33)
MCH RBC QN AUTO: 33.8 PG (ref 27–33)
MCH RBC QN AUTO: 33.9 PG (ref 27–33)
MCH RBC QN AUTO: 33.9 PG (ref 27–33)
MCH RBC QN AUTO: 34.1 PG (ref 27–33)
MCH RBC QN AUTO: 34.2 PG (ref 27–33)
MCH RBC QN AUTO: 34.3 PG (ref 27–33)
MCH RBC QN AUTO: 34.5 PG (ref 27–33)
MCHC RBC AUTO-ENTMCNC: 28.6 G/DL (ref 33.7–35.3)
MCHC RBC AUTO-ENTMCNC: 29.6 G/DL (ref 33.7–35.3)
MCHC RBC AUTO-ENTMCNC: 29.7 G/DL (ref 33.7–35.3)
MCHC RBC AUTO-ENTMCNC: 29.9 G/DL (ref 33.7–35.3)
MCHC RBC AUTO-ENTMCNC: 29.9 G/DL (ref 33.7–35.3)
MCHC RBC AUTO-ENTMCNC: 30.6 G/DL (ref 33.7–35.3)
MCHC RBC AUTO-ENTMCNC: 30.7 G/DL (ref 33.7–35.3)
MCHC RBC AUTO-ENTMCNC: 30.8 G/DL (ref 33.7–35.3)
MCHC RBC AUTO-ENTMCNC: 30.9 G/DL (ref 33.7–35.3)
MCHC RBC AUTO-ENTMCNC: 30.9 G/DL (ref 33.7–35.3)
MCHC RBC AUTO-ENTMCNC: 31.5 G/DL (ref 33.7–35.3)
MCHC RBC AUTO-ENTMCNC: 31.6 G/DL (ref 33.7–35.3)
MCHC RBC AUTO-ENTMCNC: 31.6 G/DL (ref 33.7–35.3)
MCHC RBC AUTO-ENTMCNC: 31.7 G/DL (ref 33.7–35.3)
MCHC RBC AUTO-ENTMCNC: 31.9 G/DL (ref 33.7–35.3)
MCHC RBC AUTO-ENTMCNC: 32.1 G/DL (ref 33.7–35.3)
MCHC RBC AUTO-ENTMCNC: 32.6 G/DL (ref 33.7–35.3)
MCHC RBC AUTO-ENTMCNC: 32.8 G/DL (ref 33.7–35.3)
MCHC RBC AUTO-ENTMCNC: 33.6 G/DL (ref 33.7–35.3)
MCHC RBC AUTO-ENTMCNC: 33.7 G/DL (ref 33.7–35.3)
MCHC RBC AUTO-ENTMCNC: 34.3 G/DL (ref 33.7–35.3)
MCHC RBC AUTO-ENTMCNC: 34.6 G/DL (ref 33.7–35.3)
MCV RBC AUTO: 100 FL (ref 81.4–97.8)
MCV RBC AUTO: 100.4 FL (ref 81.4–97.8)
MCV RBC AUTO: 101.7 FL (ref 81.4–97.8)
MCV RBC AUTO: 102.2 FL (ref 81.4–97.8)
MCV RBC AUTO: 103.2 FL (ref 81.4–97.8)
MCV RBC AUTO: 103.4 FL (ref 81.4–97.8)
MCV RBC AUTO: 104.5 FL (ref 81.4–97.8)
MCV RBC AUTO: 104.6 FL (ref 81.4–97.8)
MCV RBC AUTO: 104.9 FL (ref 81.4–97.8)
MCV RBC AUTO: 105.1 FL (ref 81.4–97.8)
MCV RBC AUTO: 108 FL (ref 81.4–97.8)
MCV RBC AUTO: 108.2 FL (ref 81.4–97.8)
MCV RBC AUTO: 108.3 FL (ref 81.4–97.8)
MCV RBC AUTO: 108.7 FL (ref 81.4–97.8)
MCV RBC AUTO: 110.5 FL (ref 81.4–97.8)
MCV RBC AUTO: 112.3 FL (ref 81.4–97.8)
MCV RBC AUTO: 112.4 FL (ref 81.4–97.8)
MCV RBC AUTO: 114.3 FL (ref 81.4–97.8)
MCV RBC AUTO: 115.7 FL (ref 81.4–97.8)
MCV RBC AUTO: 117 FL (ref 81.4–97.8)
MCV RBC AUTO: 97.9 FL (ref 81.4–97.8)
MCV RBC AUTO: 98.7 FL (ref 81.4–97.8)
METAMYELOCYTES NFR BLD MANUAL: 0.9 %
METAMYELOCYTES NFR BLD MANUAL: 0.9 %
METAMYELOCYTES NFR BLD MANUAL: 2.5 %
METAMYELOCYTES NFR BLD MANUAL: 3.4 %
METAMYELOCYTES NFR BLD MANUAL: 5.2 %
MICRO URNS: ABNORMAL
MICRO URNS: ABNORMAL
MICROCYTES BLD QL SMEAR: ABNORMAL
MODE IMODE: ABNORMAL
MONOCYTES # BLD AUTO: 0.33 K/UL (ref 0–0.85)
MONOCYTES # BLD AUTO: 0.43 K/UL (ref 0–0.85)
MONOCYTES # BLD AUTO: 0.65 K/UL (ref 0–0.85)
MONOCYTES # BLD AUTO: 0.69 K/UL (ref 0–0.85)
MONOCYTES # BLD AUTO: 0.73 K/UL (ref 0–0.85)
MONOCYTES # BLD AUTO: 0.78 K/UL (ref 0–0.85)
MONOCYTES # BLD AUTO: 0.79 K/UL (ref 0–0.85)
MONOCYTES # BLD AUTO: 0.8 K/UL (ref 0–0.85)
MONOCYTES # BLD AUTO: 0.89 K/UL (ref 0–0.85)
MONOCYTES # BLD AUTO: 0.97 K/UL (ref 0–0.85)
MONOCYTES # BLD AUTO: 1 K/UL (ref 0–0.85)
MONOCYTES # BLD AUTO: 1.06 K/UL (ref 0–0.85)
MONOCYTES # BLD AUTO: 1.14 K/UL (ref 0–0.85)
MONOCYTES # BLD AUTO: 1.21 K/UL (ref 0–0.85)
MONOCYTES # BLD AUTO: 1.31 K/UL (ref 0–0.85)
MONOCYTES # BLD AUTO: 2.09 K/UL (ref 0–0.85)
MONOCYTES NFR BLD AUTO: 1.7 % (ref 0–13.4)
MONOCYTES NFR BLD AUTO: 2.6 % (ref 0–13.4)
MONOCYTES NFR BLD AUTO: 2.6 % (ref 0–13.4)
MONOCYTES NFR BLD AUTO: 3.5 % (ref 0–13.4)
MONOCYTES NFR BLD AUTO: 3.5 % (ref 0–13.4)
MONOCYTES NFR BLD AUTO: 3.9 % (ref 0–13.4)
MONOCYTES NFR BLD AUTO: 4.2 % (ref 0–13.4)
MONOCYTES NFR BLD AUTO: 4.2 % (ref 0–13.4)
MONOCYTES NFR BLD AUTO: 4.3 % (ref 0–13.4)
MONOCYTES NFR BLD AUTO: 4.6 % (ref 0–13.4)
MONOCYTES NFR BLD AUTO: 5.1 % (ref 0–13.4)
MONOCYTES NFR BLD AUTO: 5.2 % (ref 0–13.4)
MONOCYTES NFR BLD AUTO: 5.2 % (ref 0–13.4)
MONOCYTES NFR BLD AUTO: 5.7 % (ref 0–13.4)
MONOCYTES NFR BLD AUTO: 6.4 % (ref 0–13.4)
MONOCYTES NFR BLD AUTO: 6.7 % (ref 0–13.4)
MORPHOLOGY BLD-IMP: NORMAL
MRSA DNA SPEC QL NAA+PROBE: NORMAL
MYELOCYTES NFR BLD MANUAL: 0.8 %
MYELOCYTES NFR BLD MANUAL: 0.8 %
MYELOCYTES NFR BLD MANUAL: 0.9 %
MYELOCYTES NFR BLD MANUAL: 1.7 %
MYELOCYTES NFR BLD MANUAL: 3.4 %
MYELOCYTES NFR BLD MANUAL: 3.4 %
NEUTROPHILS # BLD AUTO: 11.68 K/UL (ref 1.82–7.42)
NEUTROPHILS # BLD AUTO: 13.31 K/UL (ref 1.82–7.42)
NEUTROPHILS # BLD AUTO: 13.37 K/UL (ref 1.82–7.42)
NEUTROPHILS # BLD AUTO: 13.42 K/UL (ref 1.82–7.42)
NEUTROPHILS # BLD AUTO: 14.7 K/UL (ref 1.82–7.42)
NEUTROPHILS # BLD AUTO: 15.03 K/UL (ref 1.82–7.42)
NEUTROPHILS # BLD AUTO: 15.42 K/UL (ref 1.82–7.42)
NEUTROPHILS # BLD AUTO: 16.04 K/UL (ref 1.82–7.42)
NEUTROPHILS # BLD AUTO: 16.42 K/UL (ref 1.82–7.42)
NEUTROPHILS # BLD AUTO: 17.94 K/UL (ref 1.82–7.42)
NEUTROPHILS # BLD AUTO: 18.95 K/UL (ref 1.82–7.42)
NEUTROPHILS # BLD AUTO: 22.46 K/UL (ref 1.82–7.42)
NEUTROPHILS # BLD AUTO: 23.88 K/UL (ref 1.82–7.42)
NEUTROPHILS # BLD AUTO: 25.17 K/UL (ref 1.82–7.42)
NEUTROPHILS # BLD AUTO: 25.74 K/UL (ref 1.82–7.42)
NEUTROPHILS # BLD AUTO: 32.97 K/UL (ref 1.82–7.42)
NEUTROPHILS NFR BLD: 60.8 % (ref 44–72)
NEUTROPHILS NFR BLD: 68.6 % (ref 44–72)
NEUTROPHILS NFR BLD: 72.3 % (ref 44–72)
NEUTROPHILS NFR BLD: 75.6 % (ref 44–72)
NEUTROPHILS NFR BLD: 77.2 % (ref 44–72)
NEUTROPHILS NFR BLD: 77.5 % (ref 44–72)
NEUTROPHILS NFR BLD: 78.4 % (ref 44–72)
NEUTROPHILS NFR BLD: 84.9 % (ref 44–72)
NEUTROPHILS NFR BLD: 85.4 % (ref 44–72)
NEUTROPHILS NFR BLD: 86.1 % (ref 44–72)
NEUTROPHILS NFR BLD: 86.2 % (ref 44–72)
NEUTROPHILS NFR BLD: 86.3 % (ref 44–72)
NEUTROPHILS NFR BLD: 87.6 % (ref 44–72)
NEUTROPHILS NFR BLD: 87.7 % (ref 44–72)
NEUTROPHILS NFR BLD: 87.7 % (ref 44–72)
NEUTROPHILS NFR BLD: 95 % (ref 44–72)
NEUTS BAND NFR BLD MANUAL: 1.7 % (ref 0–10)
NEUTS BAND NFR BLD MANUAL: 10.5 % (ref 0–10)
NEUTS BAND NFR BLD MANUAL: 10.9 % (ref 0–10)
NEUTS BAND NFR BLD MANUAL: 2.6 % (ref 0–10)
NEUTS BAND NFR BLD MANUAL: 20 % (ref 0–10)
NEUTS BAND NFR BLD MANUAL: 5 % (ref 0–10)
NEUTS BAND NFR BLD MANUAL: 7.7 % (ref 0–10)
NEUTS BAND NFR BLD MANUAL: 8.5 % (ref 0–10)
NITRITE UR QL STRIP.AUTO: NEGATIVE
NITRITE UR QL STRIP.AUTO: NEGATIVE
NRBC # BLD AUTO: 0 K/UL
NRBC # BLD AUTO: 0.02 K/UL
NRBC # BLD AUTO: 0.04 K/UL
NRBC # BLD AUTO: 0.06 K/UL
NRBC # BLD AUTO: 0.06 K/UL
NRBC # BLD AUTO: 0.09 K/UL
NRBC # BLD AUTO: 0.23 K/UL
NRBC # BLD AUTO: 0.35 K/UL
NRBC # BLD AUTO: 0.42 K/UL
NRBC # BLD AUTO: 0.53 K/UL
NRBC # BLD AUTO: 0.54 K/UL
NRBC # BLD AUTO: 0.88 K/UL
NRBC # BLD AUTO: 1.1 K/UL
NRBC # BLD AUTO: 1.29 K/UL
NRBC BLD-RTO: 0 /100 WBC
NRBC BLD-RTO: 0.1 /100 WBC
NRBC BLD-RTO: 0.1 /100 WBC
NRBC BLD-RTO: 0.2 /100 WBC
NRBC BLD-RTO: 0.3 /100 WBC
NRBC BLD-RTO: 0.5 /100 WBC
NRBC BLD-RTO: 1.2 /100 WBC
NRBC BLD-RTO: 1.3 /100 WBC
NRBC BLD-RTO: 1.8 /100 WBC
NRBC BLD-RTO: 1.9 /100 WBC
NRBC BLD-RTO: 2.1 /100 WBC
NRBC BLD-RTO: 3.2 /100 WBC
NRBC BLD-RTO: 3.8 /100 WBC
NRBC BLD-RTO: 4.1 /100 WBC
O2/TOTAL GAS SETTING VFR VENT: 100 %
O2/TOTAL GAS SETTING VFR VENT: 40 %
O2/TOTAL GAS SETTING VFR VENT: 50 %
O2/TOTAL GAS SETTING VFR VENT: 50 %
O2/TOTAL GAS SETTING VFR VENT: 55 %
O2/TOTAL GAS SETTING VFR VENT: 60 %
O2/TOTAL GAS SETTING VFR VENT: 75 %
PA AA BLD-ACNC: 0 %
PA ADP BLD-ACNC: 0 %
PA ADP BLD-ACNC: 31.9 %
PA ADP BLD-ACNC: 39.5 %
PCO2 BLDA: 31.3 MMHG (ref 26–37)
PCO2 BLDA: 32.4 MMHG (ref 26–37)
PCO2 BLDA: 38.8 MMHG (ref 26–37)
PCO2 BLDA: 39 MMHG (ref 26–37)
PCO2 BLDA: 40.1 MMHG (ref 26–37)
PCO2 BLDA: 42.3 MMHG (ref 26–37)
PCO2 BLDA: 43.9 MMHG (ref 26–37)
PCO2 BLDA: 46.9 MMHG (ref 26–37)
PCO2 BLDA: 49.6 MMHG (ref 26–37)
PCO2 BLDA: 52.6 MMHG (ref 26–37)
PCO2 BLDA: 54.3 MMHG (ref 26–37)
PCO2 BLDA: 55 MMHG (ref 26–37)
PCO2 TEMP ADJ BLDA: 31.9 MMHG (ref 26–37)
PCO2 TEMP ADJ BLDA: 32.1 MMHG (ref 26–37)
PCO2 TEMP ADJ BLDA: 39.4 MMHG (ref 26–37)
PCO2 TEMP ADJ BLDA: 39.8 MMHG (ref 26–37)
PCO2 TEMP ADJ BLDA: 42.4 MMHG (ref 26–37)
PCO2 TEMP ADJ BLDA: 43.5 MMHG (ref 26–37)
PCO2 TEMP ADJ BLDA: 44.5 MMHG (ref 26–37)
PCO2 TEMP ADJ BLDA: 49.2 MMHG (ref 26–37)
PCO2 TEMP ADJ BLDA: 50.4 MMHG (ref 26–37)
PCO2 TEMP ADJ BLDA: 51.7 MMHG (ref 26–37)
PCO2 TEMP ADJ BLDA: 56.5 MMHG (ref 26–37)
PCO2 TEMP ADJ BLDA: 57.4 MMHG (ref 26–37)
PEEP END EXPIRATORY PRESSURE IPEEP: 10 CMH20
PEEP END EXPIRATORY PRESSURE IPEEP: 12 CMH20
PEEP END EXPIRATORY PRESSURE IPEEP: 14 CMH20
PEEP END EXPIRATORY PRESSURE IPEEP: 14 CMH20
PEEP END EXPIRATORY PRESSURE IPEEP: 8 CMH20
PERCENT MINUTE VOLUME IPMV: 150
PH BLDA: 7.25 [PH] (ref 7.4–7.5)
PH BLDA: 7.29 [PH] (ref 7.4–7.5)
PH BLDA: 7.33 [PH] (ref 7.4–7.5)
PH BLDA: 7.34 [PH] (ref 7.4–7.5)
PH BLDA: 7.37 [PH] (ref 7.4–7.5)
PH BLDA: 7.41 [PH] (ref 7.4–7.5)
PH BLDA: 7.46 [PH] (ref 7.4–7.5)
PH BLDA: 7.49 [PH] (ref 7.4–7.5)
PH BLDA: 7.49 [PH] (ref 7.4–7.5)
PH BLDA: 7.51 [PH] (ref 7.4–7.5)
PH BLDA: 7.55 [PH] (ref 7.4–7.5)
PH BLDA: 7.56 [PH] (ref 7.4–7.5)
PH TEMP ADJ BLDA: 7.24 [PH] (ref 7.4–7.5)
PH TEMP ADJ BLDA: 7.3 [PH] (ref 7.4–7.5)
PH TEMP ADJ BLDA: 7.32 [PH] (ref 7.4–7.5)
PH TEMP ADJ BLDA: 7.34 [PH] (ref 7.4–7.5)
PH TEMP ADJ BLDA: 7.36 [PH] (ref 7.4–7.5)
PH TEMP ADJ BLDA: 7.42 [PH] (ref 7.4–7.5)
PH TEMP ADJ BLDA: 7.45 [PH] (ref 7.4–7.5)
PH TEMP ADJ BLDA: 7.47 [PH] (ref 7.4–7.5)
PH TEMP ADJ BLDA: 7.48 [PH] (ref 7.4–7.5)
PH TEMP ADJ BLDA: 7.49 [PH] (ref 7.4–7.5)
PH TEMP ADJ BLDA: 7.54 [PH] (ref 7.4–7.5)
PH TEMP ADJ BLDA: 7.55 [PH] (ref 7.4–7.5)
PH UR STRIP.AUTO: 5 [PH] (ref 5–8)
PH UR STRIP.AUTO: 7 [PH] (ref 5–8)
PHOSPHATE SERPL-MCNC: 0.8 MG/DL (ref 2.5–4.5)
PHOSPHATE SERPL-MCNC: 1.2 MG/DL (ref 2.5–4.5)
PHOSPHATE SERPL-MCNC: 1.8 MG/DL (ref 2.5–4.5)
PHOSPHATE SERPL-MCNC: 2.3 MG/DL (ref 2.5–4.5)
PHOSPHATE SERPL-MCNC: 2.4 MG/DL (ref 2.5–4.5)
PHOSPHATE SERPL-MCNC: 2.4 MG/DL (ref 2.5–4.5)
PHOSPHATE SERPL-MCNC: 3.4 MG/DL (ref 2.5–4.5)
PHOSPHATE SERPL-MCNC: 5.7 MG/DL (ref 2.5–4.5)
PLATELET # BLD AUTO: 292 K/UL (ref 164–446)
PLATELET # BLD AUTO: 297 K/UL (ref 164–446)
PLATELET # BLD AUTO: 305 K/UL (ref 164–446)
PLATELET # BLD AUTO: 306 K/UL (ref 164–446)
PLATELET # BLD AUTO: 309 K/UL (ref 164–446)
PLATELET # BLD AUTO: 312 K/UL (ref 164–446)
PLATELET # BLD AUTO: 319 K/UL (ref 164–446)
PLATELET # BLD AUTO: 320 K/UL (ref 164–446)
PLATELET # BLD AUTO: 347 K/UL (ref 164–446)
PLATELET # BLD AUTO: 347 K/UL (ref 164–446)
PLATELET # BLD AUTO: 363 K/UL (ref 164–446)
PLATELET # BLD AUTO: 375 K/UL (ref 164–446)
PLATELET # BLD AUTO: 390 K/UL (ref 164–446)
PLATELET # BLD AUTO: 398 K/UL (ref 164–446)
PLATELET # BLD AUTO: 405 K/UL (ref 164–446)
PLATELET # BLD AUTO: 426 K/UL (ref 164–446)
PLATELET # BLD AUTO: 464 K/UL (ref 164–446)
PLATELET # BLD AUTO: 468 K/UL (ref 164–446)
PLATELET # BLD AUTO: 488 K/UL (ref 164–446)
PLATELET # BLD AUTO: 502 K/UL (ref 164–446)
PLATELET # BLD AUTO: 504 K/UL (ref 164–446)
PLATELET # BLD AUTO: 513 K/UL (ref 164–446)
PLATELET BLD QL SMEAR: NORMAL
PMV BLD AUTO: 10.1 FL (ref 9–12.9)
PMV BLD AUTO: 10.3 FL (ref 9–12.9)
PMV BLD AUTO: 10.3 FL (ref 9–12.9)
PMV BLD AUTO: 10.6 FL (ref 9–12.9)
PMV BLD AUTO: 10.7 FL (ref 9–12.9)
PMV BLD AUTO: 10.9 FL (ref 9–12.9)
PMV BLD AUTO: 11.2 FL (ref 9–12.9)
PMV BLD AUTO: 11.4 FL (ref 9–12.9)
PMV BLD AUTO: 11.5 FL (ref 9–12.9)
PMV BLD AUTO: 11.8 FL (ref 9–12.9)
PMV BLD AUTO: 11.9 FL (ref 9–12.9)
PMV BLD AUTO: 12 FL (ref 9–12.9)
PMV BLD AUTO: 12.2 FL (ref 9–12.9)
PMV BLD AUTO: 9.3 FL (ref 9–12.9)
PMV BLD AUTO: 9.4 FL (ref 9–12.9)
PMV BLD AUTO: 9.5 FL (ref 9–12.9)
PMV BLD AUTO: 9.6 FL (ref 9–12.9)
PO2 BLDA: 101 MMHG (ref 64–87)
PO2 BLDA: 111 MMHG (ref 64–87)
PO2 BLDA: 171 MMHG (ref 64–87)
PO2 BLDA: 191 MMHG (ref 64–87)
PO2 BLDA: 213 MMHG (ref 64–87)
PO2 BLDA: 50 MMHG (ref 64–87)
PO2 BLDA: 67 MMHG (ref 64–87)
PO2 BLDA: 72 MMHG (ref 64–87)
PO2 BLDA: 75 MMHG (ref 64–87)
PO2 BLDA: 78 MMHG (ref 64–87)
PO2 BLDA: 78 MMHG (ref 64–87)
PO2 BLDA: 79 MMHG (ref 64–87)
PO2 TEMP ADJ BLDA: 110 MMHG (ref 64–87)
PO2 TEMP ADJ BLDA: 124 MMHG (ref 64–87)
PO2 TEMP ADJ BLDA: 177 MMHG (ref 64–87)
PO2 TEMP ADJ BLDA: 193 MMHG (ref 64–87)
PO2 TEMP ADJ BLDA: 215 MMHG (ref 64–87)
PO2 TEMP ADJ BLDA: 49 MMHG (ref 64–87)
PO2 TEMP ADJ BLDA: 70 MMHG (ref 64–87)
PO2 TEMP ADJ BLDA: 73 MMHG (ref 64–87)
PO2 TEMP ADJ BLDA: 75 MMHG (ref 64–87)
PO2 TEMP ADJ BLDA: 78 MMHG (ref 64–87)
PO2 TEMP ADJ BLDA: 80 MMHG (ref 64–87)
PO2 TEMP ADJ BLDA: 81 MMHG (ref 64–87)
POIKILOCYTOSIS BLD QL SMEAR: NORMAL
POLYCHROMASIA BLD QL SMEAR: NORMAL
POTASSIUM SERPL-SCNC: 3.1 MMOL/L (ref 3.6–5.5)
POTASSIUM SERPL-SCNC: 3.2 MMOL/L (ref 3.6–5.5)
POTASSIUM SERPL-SCNC: 3.3 MMOL/L (ref 3.6–5.5)
POTASSIUM SERPL-SCNC: 3.4 MMOL/L (ref 3.6–5.5)
POTASSIUM SERPL-SCNC: 3.6 MMOL/L (ref 3.6–5.5)
POTASSIUM SERPL-SCNC: 3.6 MMOL/L (ref 3.6–5.5)
POTASSIUM SERPL-SCNC: 3.8 MMOL/L (ref 3.6–5.5)
POTASSIUM SERPL-SCNC: 3.9 MMOL/L (ref 3.6–5.5)
POTASSIUM SERPL-SCNC: 4 MMOL/L (ref 3.6–5.5)
POTASSIUM SERPL-SCNC: 4 MMOL/L (ref 3.6–5.5)
POTASSIUM SERPL-SCNC: 4.1 MMOL/L (ref 3.6–5.5)
POTASSIUM SERPL-SCNC: 4.3 MMOL/L (ref 3.6–5.5)
POTASSIUM SERPL-SCNC: 4.4 MMOL/L (ref 3.6–5.5)
POTASSIUM SERPL-SCNC: 4.6 MMOL/L (ref 3.6–5.5)
POTASSIUM SERPL-SCNC: 4.7 MMOL/L (ref 3.6–5.5)
POTASSIUM SERPL-SCNC: 4.8 MMOL/L (ref 3.6–5.5)
POTASSIUM SERPL-SCNC: 4.9 MMOL/L (ref 3.6–5.5)
POTASSIUM SERPL-SCNC: 5 MMOL/L (ref 3.6–5.5)
POTASSIUM SERPL-SCNC: 5.3 MMOL/L (ref 3.6–5.5)
POTASSIUM SERPL-SCNC: 5.4 MMOL/L (ref 3.6–5.5)
POTASSIUM SERPL-SCNC: 6.1 MMOL/L (ref 3.6–5.5)
POTASSIUM SERPL-SCNC: 6.3 MMOL/L (ref 3.6–5.5)
POTASSIUM SERPL-SCNC: 6.3 MMOL/L (ref 3.6–5.5)
POTASSIUM SERPL-SCNC: 6.6 MMOL/L (ref 3.6–5.5)
PREALB SERPL-MCNC: 15.1 MG/DL (ref 18–38)
PREALB SERPL-MCNC: 8.6 MG/DL (ref 18–38)
PROCALCITONIN SERPL-MCNC: 0.89 NG/ML
PROCALCITONIN SERPL-MCNC: 0.92 NG/ML
PROCALCITONIN SERPL-MCNC: 1.49 NG/ML
PROCALCITONIN SERPL-MCNC: 5.76 NG/ML
PRODUCT TYPE UPROD: NORMAL
PROMYELOCYTES NFR BLD MANUAL: 0.8 %
PROT SERPL-MCNC: 5.6 G/DL (ref 6–8.2)
PROT SERPL-MCNC: 5.7 G/DL (ref 6–8.2)
PROT SERPL-MCNC: 5.8 G/DL (ref 6–8.2)
PROT SERPL-MCNC: 5.8 G/DL (ref 6–8.2)
PROT SERPL-MCNC: 6 G/DL (ref 6–8.2)
PROT SERPL-MCNC: 6.1 G/DL (ref 6–8.2)
PROT SERPL-MCNC: 6.2 G/DL (ref 6–8.2)
PROT SERPL-MCNC: 6.2 G/DL (ref 6–8.2)
PROT SERPL-MCNC: 6.3 G/DL (ref 6–8.2)
PROT SERPL-MCNC: 6.3 G/DL (ref 6–8.2)
PROT UR QL STRIP: NEGATIVE MG/DL
PROT UR QL STRIP: NEGATIVE MG/DL
PROTHROMBIN TIME: 18.2 SEC (ref 12–14.6)
PROTHROMBIN TIME: 19.2 SEC (ref 12–14.6)
PROTHROMBIN TIME: 23.3 SEC (ref 12–14.6)
PROTHROMBIN TIME: 23.3 SEC (ref 12–14.6)
RBC # BLD AUTO: 2 M/UL (ref 4.7–6.1)
RBC # BLD AUTO: 2.01 M/UL (ref 4.7–6.1)
RBC # BLD AUTO: 2.3 M/UL (ref 4.7–6.1)
RBC # BLD AUTO: 2.33 M/UL (ref 4.7–6.1)
RBC # BLD AUTO: 2.37 M/UL (ref 4.7–6.1)
RBC # BLD AUTO: 2.39 M/UL (ref 4.7–6.1)
RBC # BLD AUTO: 2.43 M/UL (ref 4.7–6.1)
RBC # BLD AUTO: 2.43 M/UL (ref 4.7–6.1)
RBC # BLD AUTO: 2.53 M/UL (ref 4.7–6.1)
RBC # BLD AUTO: 2.54 M/UL (ref 4.7–6.1)
RBC # BLD AUTO: 2.58 M/UL (ref 4.7–6.1)
RBC # BLD AUTO: 2.64 M/UL (ref 4.7–6.1)
RBC # BLD AUTO: 2.67 M/UL (ref 4.7–6.1)
RBC # BLD AUTO: 2.74 M/UL (ref 4.7–6.1)
RBC # BLD AUTO: 2.75 M/UL (ref 4.7–6.1)
RBC # BLD AUTO: 2.79 M/UL (ref 4.7–6.1)
RBC # BLD AUTO: 2.8 M/UL (ref 4.7–6.1)
RBC # BLD AUTO: 2.81 M/UL (ref 4.7–6.1)
RBC # BLD AUTO: 2.84 M/UL (ref 4.7–6.1)
RBC # BLD AUTO: 2.86 M/UL (ref 4.7–6.1)
RBC # BLD AUTO: 2.88 M/UL (ref 4.7–6.1)
RBC # BLD AUTO: 2.91 M/UL (ref 4.7–6.1)
RBC # URNS HPF: ABNORMAL /HPF
RBC BLD AUTO: PRESENT
RBC UR QL AUTO: ABNORMAL
RBC UR QL AUTO: NEGATIVE
RH BLD: NORMAL
RHODAMINE-AURAMINE STN SPEC: NORMAL
SAO2 % BLDA: 100 % (ref 93–99)
SAO2 % BLDA: 81 % (ref 93–99)
SAO2 % BLDA: 93 % (ref 93–99)
SAO2 % BLDA: 95 % (ref 93–99)
SAO2 % BLDA: 96 % (ref 93–99)
SAO2 % BLDA: 97 % (ref 93–99)
SAO2 % BLDA: 99 % (ref 93–99)
SIGNIFICANT IND 70042: NORMAL
SITE SITE: NORMAL
SMUDGE CELLS BLD QL SMEAR: NORMAL
SODIUM SERPL-SCNC: 125 MMOL/L (ref 135–145)
SODIUM SERPL-SCNC: 130 MMOL/L (ref 135–145)
SODIUM SERPL-SCNC: 133 MMOL/L (ref 135–145)
SODIUM SERPL-SCNC: 133 MMOL/L (ref 135–145)
SODIUM SERPL-SCNC: 134 MMOL/L (ref 135–145)
SODIUM SERPL-SCNC: 136 MMOL/L (ref 135–145)
SODIUM SERPL-SCNC: 137 MMOL/L (ref 135–145)
SODIUM SERPL-SCNC: 138 MMOL/L (ref 135–145)
SODIUM SERPL-SCNC: 139 MMOL/L (ref 135–145)
SODIUM SERPL-SCNC: 139 MMOL/L (ref 135–145)
SODIUM SERPL-SCNC: 140 MMOL/L (ref 135–145)
SODIUM SERPL-SCNC: 142 MMOL/L (ref 135–145)
SODIUM SERPL-SCNC: 143 MMOL/L (ref 135–145)
SODIUM SERPL-SCNC: 144 MMOL/L (ref 135–145)
SODIUM SERPL-SCNC: 145 MMOL/L (ref 135–145)
SODIUM SERPL-SCNC: 147 MMOL/L (ref 135–145)
SODIUM SERPL-SCNC: 147 MMOL/L (ref 135–145)
SODIUM SERPL-SCNC: 148 MMOL/L (ref 135–145)
SODIUM SERPL-SCNC: 151 MMOL/L (ref 135–145)
SODIUM SERPL-SCNC: 151 MMOL/L (ref 135–145)
SODIUM SERPL-SCNC: 152 MMOL/L (ref 135–145)
SODIUM SERPL-SCNC: 152 MMOL/L (ref 135–145)
SODIUM SERPL-SCNC: 153 MMOL/L (ref 135–145)
SODIUM SERPL-SCNC: 155 MMOL/L (ref 135–145)
SODIUM SERPL-SCNC: 157 MMOL/L (ref 135–145)
SODIUM SERPL-SCNC: 158 MMOL/L (ref 135–145)
SODIUM SERPL-SCNC: 160 MMOL/L (ref 135–145)
SODIUM UR-SCNC: 34 MMOL/L
SOURCE SOURCE: NORMAL
SP GR UR STRIP.AUTO: 1.01
SP GR UR STRIP.AUTO: 1.01
SPECIMEN DRAWN FROM PATIENT: ABNORMAL
STOMATOCYTES BLD QL SMEAR: NORMAL
TARGETS BLD QL SMEAR: NORMAL
TEG ALGORITHM TGALG: ABNORMAL
TIDAL VOLUME IVT: 420 ML
TIDAL VOLUME IVT: 428 ML
TIDAL VOLUME IVT: 430 ML
TIDAL VOLUME IVT: 440 ML
TRANS CELLS #/AREA URNS HPF: ABNORMAL /HPF
TRIGL SERPL-MCNC: 1061 MG/DL (ref 0–149)
TRIGL SERPL-MCNC: 614 MG/DL (ref 0–149)
TRIGL SERPL-MCNC: 731 MG/DL (ref 0–149)
TRIGL SERPL-MCNC: 773 MG/DL (ref 0–149)
UNIT STATUS USTAT: NORMAL
UROBILINOGEN UR STRIP.AUTO-MCNC: 0.2 MG/DL
UROBILINOGEN UR STRIP.AUTO-MCNC: 0.2 MG/DL
VIT B12 SERPL-MCNC: 2156 PG/ML (ref 211–911)
WBC # BLD AUTO: 13.3 K/UL (ref 4.8–10.8)
WBC # BLD AUTO: 15.3 K/UL (ref 4.8–10.8)
WBC # BLD AUTO: 15.4 K/UL (ref 4.8–10.8)
WBC # BLD AUTO: 15.5 K/UL (ref 4.8–10.8)
WBC # BLD AUTO: 16.4 K/UL (ref 4.8–10.8)
WBC # BLD AUTO: 17.2 K/UL (ref 4.8–10.8)
WBC # BLD AUTO: 18.9 K/UL (ref 4.8–10.8)
WBC # BLD AUTO: 19.6 K/UL (ref 4.8–10.8)
WBC # BLD AUTO: 19.9 K/UL (ref 4.8–10.8)
WBC # BLD AUTO: 20.5 K/UL (ref 4.8–10.8)
WBC # BLD AUTO: 21.3 K/UL (ref 4.8–10.8)
WBC # BLD AUTO: 25.3 K/UL (ref 4.8–10.8)
WBC # BLD AUTO: 25.5 K/UL (ref 4.8–10.8)
WBC # BLD AUTO: 27.8 K/UL (ref 4.8–10.8)
WBC # BLD AUTO: 28 K/UL (ref 4.8–10.8)
WBC # BLD AUTO: 28.7 K/UL (ref 4.8–10.8)
WBC # BLD AUTO: 28.7 K/UL (ref 4.8–10.8)
WBC # BLD AUTO: 29 K/UL (ref 4.8–10.8)
WBC # BLD AUTO: 29.5 K/UL (ref 4.8–10.8)
WBC # BLD AUTO: 31.2 K/UL (ref 4.8–10.8)
WBC # BLD AUTO: 31.4 K/UL (ref 4.8–10.8)
WBC # BLD AUTO: 40.7 K/UL (ref 4.8–10.8)
WBC #/AREA URNS HPF: ABNORMAL /HPF

## 2021-01-01 PROCEDURE — 85007 BL SMEAR W/DIFF WBC COUNT: CPT

## 2021-01-01 PROCEDURE — 99291 CRITICAL CARE FIRST HOUR: CPT | Performed by: INTERNAL MEDICINE

## 2021-01-01 PROCEDURE — A9270 NON-COVERED ITEM OR SERVICE: HCPCS | Performed by: INTERNAL MEDICINE

## 2021-01-01 PROCEDURE — 770022 HCHG ROOM/CARE - ICU (200)

## 2021-01-01 PROCEDURE — 86923 COMPATIBILITY TEST ELECTRIC: CPT | Mod: 91

## 2021-01-01 PROCEDURE — 700102 HCHG RX REV CODE 250 W/ 637 OVERRIDE(OP): Performed by: INTERNAL MEDICINE

## 2021-01-01 PROCEDURE — 700105 HCHG RX REV CODE 258: Performed by: INTERNAL MEDICINE

## 2021-01-01 PROCEDURE — 700111 HCHG RX REV CODE 636 W/ 250 OVERRIDE (IP): Performed by: INTERNAL MEDICINE

## 2021-01-01 PROCEDURE — 700102 HCHG RX REV CODE 250 W/ 637 OVERRIDE(OP): Performed by: STUDENT IN AN ORGANIZED HEALTH CARE EDUCATION/TRAINING PROGRAM

## 2021-01-01 PROCEDURE — 76700 US EXAM ABDOM COMPLETE: CPT

## 2021-01-01 PROCEDURE — 85384 FIBRINOGEN ACTIVITY: CPT

## 2021-01-01 PROCEDURE — 82330 ASSAY OF CALCIUM: CPT

## 2021-01-01 PROCEDURE — 87389 HIV-1 AG W/HIV-1&-2 AB AG IA: CPT

## 2021-01-01 PROCEDURE — 94003 VENT MGMT INPAT SUBQ DAY: CPT

## 2021-01-01 PROCEDURE — A9270 NON-COVERED ITEM OR SERVICE: HCPCS | Performed by: STUDENT IN AN ORGANIZED HEALTH CARE EDUCATION/TRAINING PROGRAM

## 2021-01-01 PROCEDURE — 84478 ASSAY OF TRIGLYCERIDES: CPT

## 2021-01-01 PROCEDURE — 302977 HCHG BRONCHOSCOPY PROC-THERAPEUTIC

## 2021-01-01 PROCEDURE — 83735 ASSAY OF MAGNESIUM: CPT

## 2021-01-01 PROCEDURE — 99291 CRITICAL CARE FIRST HOUR: CPT | Mod: 25 | Performed by: INTERNAL MEDICINE

## 2021-01-01 PROCEDURE — 36600 WITHDRAWAL OF ARTERIAL BLOOD: CPT

## 2021-01-01 PROCEDURE — 82962 GLUCOSE BLOOD TEST: CPT | Mod: 91

## 2021-01-01 PROCEDURE — 80053 COMPREHEN METABOLIC PANEL: CPT

## 2021-01-01 PROCEDURE — 700101 HCHG RX REV CODE 250

## 2021-01-01 PROCEDURE — 94669 MECHANICAL CHEST WALL OSCILL: CPT

## 2021-01-01 PROCEDURE — 82803 BLOOD GASES ANY COMBINATION: CPT

## 2021-01-01 PROCEDURE — 85025 COMPLETE CBC W/AUTO DIFF WBC: CPT

## 2021-01-01 PROCEDURE — 99292 CRITICAL CARE ADDL 30 MIN: CPT | Mod: 25 | Performed by: INTERNAL MEDICINE

## 2021-01-01 PROCEDURE — 71045 X-RAY EXAM CHEST 1 VIEW: CPT

## 2021-01-01 PROCEDURE — 85576 BLOOD PLATELET AGGREGATION: CPT

## 2021-01-01 PROCEDURE — 302978 HCHG BRONCHOSCOPY-DIAGNOSTIC

## 2021-01-01 PROCEDURE — 82803 BLOOD GASES ANY COMBINATION: CPT | Mod: 91

## 2021-01-01 PROCEDURE — 87205 SMEAR GRAM STAIN: CPT

## 2021-01-01 PROCEDURE — 94799 UNLISTED PULMONARY SVC/PX: CPT

## 2021-01-01 PROCEDURE — 85027 COMPLETE CBC AUTOMATED: CPT

## 2021-01-01 PROCEDURE — 30905 CONTROL OF NOSEBLEED: CPT | Mod: 50 | Performed by: INTERNAL MEDICINE

## 2021-01-01 PROCEDURE — 87206 SMEAR FLUORESCENT/ACID STAI: CPT

## 2021-01-01 PROCEDURE — 700101 HCHG RX REV CODE 250: Performed by: INTERNAL MEDICINE

## 2021-01-01 PROCEDURE — 85027 COMPLETE CBC AUTOMATED: CPT | Mod: 91

## 2021-01-01 PROCEDURE — 80048 BASIC METABOLIC PNL TOTAL CA: CPT

## 2021-01-01 PROCEDURE — 36556 INSERT NON-TUNNEL CV CATH: CPT | Mod: GC,RT | Performed by: INTERNAL MEDICINE

## 2021-01-01 PROCEDURE — 85347 COAGULATION TIME ACTIVATED: CPT

## 2021-01-01 PROCEDURE — 94760 N-INVAS EAR/PLS OXIMETRY 1: CPT

## 2021-01-01 PROCEDURE — 31645 BRNCHSC W/THER ASPIR 1ST: CPT | Performed by: INTERNAL MEDICINE

## 2021-01-01 PROCEDURE — 87071 CULTURE AEROBIC QUANT OTHER: CPT

## 2021-01-01 PROCEDURE — 85576 BLOOD PLATELET AGGREGATION: CPT | Mod: 91

## 2021-01-01 PROCEDURE — A9270 NON-COVERED ITEM OR SERVICE: HCPCS | Performed by: PSYCHIATRY & NEUROLOGY

## 2021-01-01 PROCEDURE — 84100 ASSAY OF PHOSPHORUS: CPT

## 2021-01-01 PROCEDURE — 99152 MOD SED SAME PHYS/QHP 5/>YRS: CPT

## 2021-01-01 PROCEDURE — 302307 BAG FECAL MANAGEMENT TEMPORARY COLLECTION WITH FILTER - SEAL SIGNAL FMS: Performed by: PSYCHIATRY & NEUROLOGY

## 2021-01-01 PROCEDURE — 700102 HCHG RX REV CODE 250 W/ 637 OVERRIDE(OP): Performed by: PSYCHIATRY & NEUROLOGY

## 2021-01-01 PROCEDURE — 36430 TRANSFUSION BLD/BLD COMPNT: CPT

## 2021-01-01 PROCEDURE — 85610 PROTHROMBIN TIME: CPT

## 2021-01-01 PROCEDURE — 82570 ASSAY OF URINE CREATININE: CPT

## 2021-01-01 PROCEDURE — 93970 EXTREMITY STUDY: CPT | Mod: 26 | Performed by: INTERNAL MEDICINE

## 2021-01-01 PROCEDURE — 87040 BLOOD CULTURE FOR BACTERIA: CPT | Mod: 91

## 2021-01-01 PROCEDURE — 2Y41X5Z PACKING OF NASAL REGION USING PACKING MATERIAL: ICD-10-PCS | Performed by: SPECIALIST

## 2021-01-01 PROCEDURE — 82550 ASSAY OF CK (CPK): CPT

## 2021-01-01 PROCEDURE — 86901 BLOOD TYPING SEROLOGIC RH(D): CPT

## 2021-01-01 PROCEDURE — 83605 ASSAY OF LACTIC ACID: CPT

## 2021-01-01 PROCEDURE — 82607 VITAMIN B-12: CPT

## 2021-01-01 PROCEDURE — 87070 CULTURE OTHR SPECIMN AEROBIC: CPT

## 2021-01-01 PROCEDURE — 87040 BLOOD CULTURE FOR BACTERIA: CPT

## 2021-01-01 PROCEDURE — P9016 RBC LEUKOCYTES REDUCED: HCPCS | Mod: 91

## 2021-01-01 PROCEDURE — 02H633Z INSERTION OF INFUSION DEVICE INTO RIGHT ATRIUM, PERCUTANEOUS APPROACH: ICD-10-PCS | Performed by: INTERNAL MEDICINE

## 2021-01-01 PROCEDURE — 93010 ELECTROCARDIOGRAM REPORT: CPT | Performed by: INTERNAL MEDICINE

## 2021-01-01 PROCEDURE — 31624 DX BRONCHOSCOPE/LAVAGE: CPT | Performed by: EMERGENCY MEDICINE

## 2021-01-01 PROCEDURE — 31624 DX BRONCHOSCOPE/LAVAGE: CPT | Mod: 51 | Performed by: INTERNAL MEDICINE

## 2021-01-01 PROCEDURE — 99254 IP/OBS CNSLTJ NEW/EST MOD 60: CPT | Performed by: INTERNAL MEDICINE

## 2021-01-01 PROCEDURE — 94002 VENT MGMT INPAT INIT DAY: CPT

## 2021-01-01 PROCEDURE — 84134 ASSAY OF PREALBUMIN: CPT

## 2021-01-01 PROCEDURE — 87205 SMEAR GRAM STAIN: CPT | Mod: 91

## 2021-01-01 PROCEDURE — 84145 PROCALCITONIN (PCT): CPT

## 2021-01-01 PROCEDURE — 31500 INSERT EMERGENCY AIRWAY: CPT | Performed by: INTERNAL MEDICINE

## 2021-01-01 PROCEDURE — 80048 BASIC METABOLIC PNL TOTAL CA: CPT | Mod: 91

## 2021-01-01 PROCEDURE — 82746 ASSAY OF FOLIC ACID SERUM: CPT

## 2021-01-01 PROCEDURE — 92612 ENDOSCOPY SWALLOW (FEES) VID: CPT

## 2021-01-01 PROCEDURE — 99223 1ST HOSP IP/OBS HIGH 75: CPT | Performed by: INTERNAL MEDICINE

## 2021-01-01 PROCEDURE — 99233 SBSQ HOSP IP/OBS HIGH 50: CPT | Performed by: INTERNAL MEDICINE

## 2021-01-01 PROCEDURE — 86140 C-REACTIVE PROTEIN: CPT

## 2021-01-01 PROCEDURE — 94150 VITAL CAPACITY TEST: CPT

## 2021-01-01 PROCEDURE — 0B9D8ZX DRAINAGE OF RIGHT MIDDLE LUNG LOBE, VIA NATURAL OR ARTIFICIAL OPENING ENDOSCOPIC, DIAGNOSTIC: ICD-10-PCS | Performed by: INTERNAL MEDICINE

## 2021-01-01 PROCEDURE — P9017 PLASMA 1 DONOR FRZ W/IN 8 HR: HCPCS

## 2021-01-01 PROCEDURE — 0B9J8ZX DRAINAGE OF LEFT LOWER LUNG LOBE, VIA NATURAL OR ARTIFICIAL OPENING ENDOSCOPIC, DIAGNOSTIC: ICD-10-PCS | Performed by: EMERGENCY MEDICINE

## 2021-01-01 PROCEDURE — 87493 C DIFF AMPLIFIED PROBE: CPT

## 2021-01-01 PROCEDURE — A9270 NON-COVERED ITEM OR SERVICE: HCPCS | Performed by: SPECIALIST

## 2021-01-01 PROCEDURE — 74176 CT ABD & PELVIS W/O CONTRAST: CPT

## 2021-01-01 PROCEDURE — 82140 ASSAY OF AMMONIA: CPT

## 2021-01-01 PROCEDURE — 5A1945Z RESPIRATORY VENTILATION, 24-96 CONSECUTIVE HOURS: ICD-10-PCS | Performed by: EMERGENCY MEDICINE

## 2021-01-01 PROCEDURE — P9017 PLASMA 1 DONOR FRZ W/IN 8 HR: HCPCS | Mod: 91

## 2021-01-01 PROCEDURE — 86900 BLOOD TYPING SEROLOGIC ABO: CPT

## 2021-01-01 PROCEDURE — 85384 FIBRINOGEN ACTIVITY: CPT | Mod: 91

## 2021-01-01 PROCEDURE — 36556 INSERT NON-TUNNEL CV CATH: CPT | Mod: LT | Performed by: HOSPITALIST

## 2021-01-01 PROCEDURE — 97535 SELF CARE MNGMENT TRAINING: CPT

## 2021-01-01 PROCEDURE — P9016 RBC LEUKOCYTES REDUCED: HCPCS

## 2021-01-01 PROCEDURE — 93970 EXTREMITY STUDY: CPT

## 2021-01-01 PROCEDURE — 80074 ACUTE HEPATITIS PANEL: CPT

## 2021-01-01 PROCEDURE — 87116 MYCOBACTERIA CULTURE: CPT

## 2021-01-01 PROCEDURE — 99255 IP/OBS CONSLTJ NEW/EST HI 80: CPT | Performed by: INTERNAL MEDICINE

## 2021-01-01 PROCEDURE — 92526 ORAL FUNCTION THERAPY: CPT

## 2021-01-01 PROCEDURE — 700111 HCHG RX REV CODE 636 W/ 250 OVERRIDE (IP)

## 2021-01-01 PROCEDURE — 87102 FUNGUS ISOLATION CULTURE: CPT

## 2021-01-01 PROCEDURE — 71275 CT ANGIOGRAPHY CHEST: CPT

## 2021-01-01 PROCEDURE — 94640 AIRWAY INHALATION TREATMENT: CPT

## 2021-01-01 PROCEDURE — 31500 INSERT EMERGENCY AIRWAY: CPT

## 2021-01-01 PROCEDURE — 095N8ZZ DESTRUCTION OF NASOPHARYNX, VIA NATURAL OR ARTIFICIAL OPENING ENDOSCOPIC: ICD-10-PCS | Performed by: INTERNAL MEDICINE

## 2021-01-01 PROCEDURE — 97166 OT EVAL MOD COMPLEX 45 MIN: CPT

## 2021-01-01 PROCEDURE — 700117 HCHG RX CONTRAST REV CODE 255: Performed by: INTERNAL MEDICINE

## 2021-01-01 PROCEDURE — 76705 ECHO EXAM OF ABDOMEN: CPT

## 2021-01-01 PROCEDURE — 700105 HCHG RX REV CODE 258

## 2021-01-01 PROCEDURE — P9047 ALBUMIN (HUMAN), 25%, 50ML: HCPCS | Performed by: INTERNAL MEDICINE

## 2021-01-01 PROCEDURE — 86923 COMPATIBILITY TEST ELECTRIC: CPT

## 2021-01-01 PROCEDURE — 700101 HCHG RX REV CODE 250: Performed by: SPECIALIST

## 2021-01-01 PROCEDURE — 86850 RBC ANTIBODY SCREEN: CPT

## 2021-01-01 PROCEDURE — HZ2ZZZZ DETOXIFICATION SERVICES FOR SUBSTANCE ABUSE TREATMENT: ICD-10-PCS | Performed by: EMERGENCY MEDICINE

## 2021-01-01 PROCEDURE — 99291 CRITICAL CARE FIRST HOUR: CPT | Mod: 25 | Performed by: EMERGENCY MEDICINE

## 2021-01-01 PROCEDURE — 700102 HCHG RX REV CODE 250 W/ 637 OVERRIDE(OP): Performed by: SPECIALIST

## 2021-01-01 PROCEDURE — 02HV33Z INSERTION OF INFUSION DEVICE INTO SUPERIOR VENA CAVA, PERCUTANEOUS APPROACH: ICD-10-PCS | Performed by: INTERNAL MEDICINE

## 2021-01-01 PROCEDURE — 31575 DIAGNOSTIC LARYNGOSCOPY: CPT | Performed by: INTERNAL MEDICINE

## 2021-01-01 PROCEDURE — 99291 CRITICAL CARE FIRST HOUR: CPT | Mod: GC,25 | Performed by: INTERNAL MEDICINE

## 2021-01-01 PROCEDURE — 84300 ASSAY OF URINE SODIUM: CPT

## 2021-01-01 PROCEDURE — 87641 MR-STAPH DNA AMP PROBE: CPT

## 2021-01-01 PROCEDURE — 81003 URINALYSIS AUTO W/O SCOPE: CPT

## 2021-01-01 PROCEDURE — 83690 ASSAY OF LIPASE: CPT

## 2021-01-01 PROCEDURE — 87015 SPECIMEN INFECT AGNT CONCNTJ: CPT

## 2021-01-01 PROCEDURE — 36556 INSERT NON-TUNNEL CV CATH: CPT | Performed by: EMERGENCY MEDICINE

## 2021-01-01 PROCEDURE — 97162 PT EVAL MOD COMPLEX 30 MIN: CPT

## 2021-01-01 PROCEDURE — 700101 HCHG RX REV CODE 250: Performed by: EMERGENCY MEDICINE

## 2021-01-01 PROCEDURE — 81001 URINALYSIS AUTO W/SCOPE: CPT

## 2021-01-01 PROCEDURE — 92610 EVALUATE SWALLOWING FUNCTION: CPT

## 2021-01-01 PROCEDURE — 36556 INSERT NON-TUNNEL CV CATH: CPT | Mod: LT | Performed by: INTERNAL MEDICINE

## 2021-01-01 PROCEDURE — 93005 ELECTROCARDIOGRAM TRACING: CPT | Performed by: INTERNAL MEDICINE

## 2021-01-01 RX ORDER — EPINEPHRINE HCL IN 0.9 % NACL 4MG/250ML
0-10 PLASTIC BAG, INJECTION (ML) INTRAVENOUS CONTINUOUS
Status: DISCONTINUED | OUTPATIENT
Start: 2021-01-01 | End: 2021-01-01

## 2021-01-01 RX ORDER — ONDANSETRON 2 MG/ML
8 INJECTION INTRAMUSCULAR; INTRAVENOUS EVERY 8 HOURS PRN
Status: DISCONTINUED | OUTPATIENT
Start: 2021-01-01 | End: 2021-01-01 | Stop reason: HOSPADM

## 2021-01-01 RX ORDER — FUROSEMIDE 10 MG/ML
40 INJECTION INTRAMUSCULAR; INTRAVENOUS EVERY 8 HOURS
Status: DISCONTINUED | OUTPATIENT
Start: 2021-01-01 | End: 2021-01-01

## 2021-01-01 RX ORDER — ATROPINE SULFATE 10 MG/ML
2 SOLUTION/ DROPS OPHTHALMIC EVERY 4 HOURS PRN
Status: DISCONTINUED | OUTPATIENT
Start: 2021-01-01 | End: 2021-01-01 | Stop reason: HOSPADM

## 2021-01-01 RX ORDER — SODIUM CHLORIDE 9 MG/ML
INJECTION, SOLUTION INTRAVENOUS CONTINUOUS
Status: DISCONTINUED | OUTPATIENT
Start: 2021-01-01 | End: 2021-01-01

## 2021-01-01 RX ORDER — DEXMEDETOMIDINE HYDROCHLORIDE 4 UG/ML
.1-1.5 INJECTION, SOLUTION INTRAVENOUS CONTINUOUS
Status: DISCONTINUED | OUTPATIENT
Start: 2021-01-01 | End: 2021-01-01

## 2021-01-01 RX ORDER — FAMOTIDINE 20 MG/1
20 TABLET, FILM COATED ORAL EVERY 12 HOURS
Status: DISCONTINUED | OUTPATIENT
Start: 2021-01-01 | End: 2021-01-01

## 2021-01-01 RX ORDER — HALOPERIDOL 5 MG/ML
2-5 INJECTION INTRAMUSCULAR EVERY 4 HOURS PRN
Status: DISCONTINUED | OUTPATIENT
Start: 2021-01-01 | End: 2021-01-01

## 2021-01-01 RX ORDER — LORAZEPAM 2 MG/ML
1 INJECTION INTRAMUSCULAR
Status: DISCONTINUED | OUTPATIENT
Start: 2021-01-01 | End: 2021-01-01 | Stop reason: HOSPADM

## 2021-01-01 RX ORDER — MIDODRINE HYDROCHLORIDE 5 MG/1
10 TABLET ORAL
Status: DISCONTINUED | OUTPATIENT
Start: 2021-01-01 | End: 2021-01-01

## 2021-01-01 RX ORDER — LINEZOLID 600 MG/1
600 TABLET, FILM COATED ORAL EVERY 12 HOURS
Status: DISCONTINUED | OUTPATIENT
Start: 2021-01-01 | End: 2021-01-01

## 2021-01-01 RX ORDER — FUROSEMIDE 10 MG/ML
40 INJECTION INTRAMUSCULAR; INTRAVENOUS DAILY
Status: DISCONTINUED | OUTPATIENT
Start: 2021-01-01 | End: 2021-01-01

## 2021-01-01 RX ORDER — ACETAMINOPHEN 325 MG/1
650 TABLET ORAL EVERY 4 HOURS PRN
Status: DISCONTINUED | OUTPATIENT
Start: 2021-01-01 | End: 2021-01-01

## 2021-01-01 RX ORDER — LIDOCAINE HCL/EPINEPHRINE/PF 2%-1:200K
VIAL (ML) INJECTION
Status: DISPENSED
Start: 2021-01-01 | End: 2021-01-01

## 2021-01-01 RX ORDER — OLANZAPINE 5 MG/1
5 TABLET ORAL EVERY EVENING
Status: DISCONTINUED | OUTPATIENT
Start: 2021-01-01 | End: 2021-01-01

## 2021-01-01 RX ORDER — SPIRONOLACTONE 25 MG/1
50 TABLET ORAL
Status: DISCONTINUED | OUTPATIENT
Start: 2021-01-01 | End: 2021-01-01

## 2021-01-01 RX ORDER — POLYVINYL ALCOHOL 14 MG/ML
2 SOLUTION/ DROPS OPHTHALMIC EVERY 6 HOURS PRN
Status: DISCONTINUED | OUTPATIENT
Start: 2021-01-01 | End: 2021-01-01 | Stop reason: HOSPADM

## 2021-01-01 RX ORDER — FUROSEMIDE 20 MG/1
20 TABLET ORAL
Status: DISCONTINUED | OUTPATIENT
Start: 2021-01-01 | End: 2021-01-01

## 2021-01-01 RX ORDER — LORAZEPAM 2 MG/ML
.5-2 INJECTION INTRAMUSCULAR
Status: DISCONTINUED | OUTPATIENT
Start: 2021-01-01 | End: 2021-01-01

## 2021-01-01 RX ORDER — BISACODYL 10 MG
10 SUPPOSITORY, RECTAL RECTAL
Status: DISCONTINUED | OUTPATIENT
Start: 2021-01-01 | End: 2021-01-01

## 2021-01-01 RX ORDER — ACETAZOLAMIDE 250 MG/1
250 TABLET ORAL
Status: DISCONTINUED | OUTPATIENT
Start: 2021-01-01 | End: 2021-01-01

## 2021-01-01 RX ORDER — LACTULOSE 20 G/30ML
30 SOLUTION ORAL 3 TIMES DAILY
Status: DISCONTINUED | OUTPATIENT
Start: 2021-01-01 | End: 2021-01-01

## 2021-01-01 RX ORDER — POLYETHYLENE GLYCOL 3350 17 G/17G
1 POWDER, FOR SOLUTION ORAL
Status: DISCONTINUED | OUTPATIENT
Start: 2021-01-01 | End: 2021-01-01

## 2021-01-01 RX ORDER — AMOXICILLIN 250 MG
2 CAPSULE ORAL 2 TIMES DAILY
Status: DISCONTINUED | OUTPATIENT
Start: 2021-01-01 | End: 2021-01-01

## 2021-01-01 RX ORDER — LORAZEPAM 2 MG/ML
1 CONCENTRATE ORAL
Status: DISCONTINUED | OUTPATIENT
Start: 2021-01-01 | End: 2021-01-01 | Stop reason: HOSPADM

## 2021-01-01 RX ORDER — DOXYCYCLINE 100 MG/1
100 TABLET ORAL EVERY 12 HOURS
Status: DISCONTINUED | OUTPATIENT
Start: 2021-01-01 | End: 2021-01-01

## 2021-01-01 RX ORDER — SCOLOPAMINE TRANSDERMAL SYSTEM 1 MG/1
1 PATCH, EXTENDED RELEASE TRANSDERMAL
Status: DISCONTINUED | OUTPATIENT
Start: 2021-01-01 | End: 2021-01-01 | Stop reason: HOSPADM

## 2021-01-01 RX ORDER — FUROSEMIDE 10 MG/ML
20 INJECTION INTRAMUSCULAR; INTRAVENOUS EVERY 12 HOURS
Status: DISCONTINUED | OUTPATIENT
Start: 2021-01-01 | End: 2021-01-01

## 2021-01-01 RX ORDER — ALBUMIN (HUMAN) 12.5 G/50ML
50 SOLUTION INTRAVENOUS 2 TIMES DAILY
Status: DISCONTINUED | OUTPATIENT
Start: 2021-01-01 | End: 2021-01-01

## 2021-01-01 RX ORDER — DEXTROSE MONOHYDRATE 25 G/50ML
50 INJECTION, SOLUTION INTRAVENOUS
Status: DISCONTINUED | OUTPATIENT
Start: 2021-01-01 | End: 2021-01-01

## 2021-01-01 RX ORDER — HALOPERIDOL 2 MG/1
2-5 TABLET ORAL EVERY 6 HOURS PRN
Status: DISCONTINUED | OUTPATIENT
Start: 2021-01-01 | End: 2021-01-01

## 2021-01-01 RX ORDER — POTASSIUM CHLORIDE 14.9 MG/ML
20 INJECTION INTRAVENOUS ONCE
Status: COMPLETED | OUTPATIENT
Start: 2021-01-01 | End: 2021-01-01

## 2021-01-01 RX ORDER — EPINEPHRINE IN SOD CHLOR,ISO 1 MG/10 ML
SYRINGE (ML) INTRAVENOUS
Status: ACTIVE
Start: 2021-01-01 | End: 2021-01-01

## 2021-01-01 RX ORDER — FUROSEMIDE 10 MG/ML
80 INJECTION INTRAMUSCULAR; INTRAVENOUS EVERY 12 HOURS
Status: DISCONTINUED | OUTPATIENT
Start: 2021-01-01 | End: 2021-01-01

## 2021-01-01 RX ORDER — TRANEXAMIC ACID 100 MG/ML
3 INJECTION, SOLUTION INTRAVENOUS ONCE
Status: COMPLETED | OUTPATIENT
Start: 2021-01-01 | End: 2021-01-01

## 2021-01-01 RX ORDER — HALOPERIDOL 5 MG/ML
INJECTION INTRAMUSCULAR
Status: COMPLETED
Start: 2021-01-01 | End: 2021-01-01

## 2021-01-01 RX ORDER — FAMOTIDINE 20 MG/1
20 TABLET, FILM COATED ORAL DAILY
Status: DISCONTINUED | OUTPATIENT
Start: 2021-01-01 | End: 2021-01-01

## 2021-01-01 RX ORDER — ONDANSETRON 4 MG/1
8 TABLET, ORALLY DISINTEGRATING ORAL EVERY 8 HOURS PRN
Status: DISCONTINUED | OUTPATIENT
Start: 2021-01-01 | End: 2021-01-01 | Stop reason: HOSPADM

## 2021-01-01 RX ORDER — COCAINE HYDROCHLORIDE 40 MG/ML
4 SOLUTION NASAL ONCE
Status: COMPLETED | OUTPATIENT
Start: 2021-01-01 | End: 2021-01-01

## 2021-01-01 RX ORDER — ROCURONIUM BROMIDE 10 MG/ML
100 INJECTION, SOLUTION INTRAVENOUS ONCE
Status: COMPLETED | OUTPATIENT
Start: 2021-01-01 | End: 2021-01-01

## 2021-01-01 RX ORDER — TRANEXAMIC ACID 100 MG/ML
INJECTION, SOLUTION INTRAVENOUS
Status: DISPENSED
Start: 2021-01-01 | End: 2021-01-01

## 2021-01-01 RX ORDER — AMOXICILLIN AND CLAVULANATE POTASSIUM 875; 125 MG/1; MG/1
1 TABLET, FILM COATED ORAL EVERY 12 HOURS
Status: COMPLETED | OUTPATIENT
Start: 2021-01-01 | End: 2021-01-01

## 2021-01-01 RX ORDER — DEXTROSE MONOHYDRATE 50 MG/ML
INJECTION, SOLUTION INTRAVENOUS CONTINUOUS
Status: DISCONTINUED | OUTPATIENT
Start: 2021-01-01 | End: 2021-01-01

## 2021-01-01 RX ORDER — MORPHINE SULFATE 10 MG/ML
5 INJECTION, SOLUTION INTRAMUSCULAR; INTRAVENOUS
Status: DISCONTINUED | OUTPATIENT
Start: 2021-01-01 | End: 2021-01-01 | Stop reason: HOSPADM

## 2021-01-01 RX ORDER — ACETAMINOPHEN 325 MG/1
650 TABLET ORAL EVERY 4 HOURS PRN
Status: DISCONTINUED | OUTPATIENT
Start: 2021-01-01 | End: 2021-01-01 | Stop reason: HOSPADM

## 2021-01-01 RX ORDER — LIDOCAINE 50 MG/G
1 PATCH TOPICAL EVERY 24 HOURS
Status: DISCONTINUED | OUTPATIENT
Start: 2021-01-01 | End: 2021-01-01

## 2021-01-01 RX ORDER — ACETAMINOPHEN 650 MG/1
650 SUPPOSITORY RECTAL EVERY 4 HOURS PRN
Status: DISCONTINUED | OUTPATIENT
Start: 2021-01-01 | End: 2021-01-01 | Stop reason: HOSPADM

## 2021-01-01 RX ORDER — PHYTONADIONE 5 MG/1
10 TABLET ORAL DAILY
Status: COMPLETED | OUTPATIENT
Start: 2021-01-01 | End: 2021-01-01

## 2021-01-01 RX ORDER — NOREPINEPHRINE BITARTRATE 0.03 MG/ML
0-30 INJECTION, SOLUTION INTRAVENOUS CONTINUOUS
Status: DISCONTINUED | OUTPATIENT
Start: 2021-01-01 | End: 2021-01-01

## 2021-01-01 RX ORDER — BACITRACIN ZINC 500 [USP'U]/G
OINTMENT TOPICAL ONCE
Status: COMPLETED | OUTPATIENT
Start: 2021-01-01 | End: 2021-01-01

## 2021-01-01 RX ORDER — ETOMIDATE 2 MG/ML
40 INJECTION INTRAVENOUS ONCE
Status: COMPLETED | OUTPATIENT
Start: 2021-01-01 | End: 2021-01-01

## 2021-01-01 RX ORDER — KETAMINE HYDROCHLORIDE 50 MG/ML
100 INJECTION, SOLUTION INTRAMUSCULAR; INTRAVENOUS ONCE
Status: COMPLETED | OUTPATIENT
Start: 2021-01-01 | End: 2021-01-01

## 2021-01-01 RX ORDER — FOLIC ACID 1 MG/1
1 TABLET ORAL DAILY
Status: DISCONTINUED | OUTPATIENT
Start: 2021-01-01 | End: 2021-01-01

## 2021-01-01 RX ORDER — SODIUM CHLORIDE, SODIUM LACTATE, POTASSIUM CHLORIDE, AND CALCIUM CHLORIDE .6; .31; .03; .02 G/100ML; G/100ML; G/100ML; G/100ML
1000 INJECTION, SOLUTION INTRAVENOUS ONCE
Status: COMPLETED | OUTPATIENT
Start: 2021-01-01 | End: 2021-01-01

## 2021-01-01 RX ORDER — MORPHINE SULFATE 10 MG/ML
10 INJECTION, SOLUTION INTRAMUSCULAR; INTRAVENOUS
Status: DISCONTINUED | OUTPATIENT
Start: 2021-01-01 | End: 2021-01-01 | Stop reason: HOSPADM

## 2021-01-01 RX ORDER — GAUZE BANDAGE 2" X 2"
100 BANDAGE TOPICAL DAILY
Status: DISCONTINUED | OUTPATIENT
Start: 2021-01-01 | End: 2021-01-01

## 2021-01-01 RX ORDER — LINEZOLID 2 MG/ML
600 INJECTION, SOLUTION INTRAVENOUS EVERY 12 HOURS
Status: DISCONTINUED | OUTPATIENT
Start: 2021-01-01 | End: 2021-01-01

## 2021-01-01 RX ORDER — NOREPINEPHRINE BITARTRATE 0.03 MG/ML
INJECTION, SOLUTION INTRAVENOUS
Status: COMPLETED
Start: 2021-01-01 | End: 2021-01-01

## 2021-01-01 RX ORDER — LIDOCAINE HYDROCHLORIDE AND EPINEPHRINE BITARTRATE 20; .01 MG/ML; MG/ML
10 INJECTION, SOLUTION SUBCUTANEOUS ONCE
Status: DISCONTINUED | OUTPATIENT
Start: 2021-01-01 | End: 2021-01-01

## 2021-01-01 RX ORDER — LIDOCAINE HCL/EPINEPHRINE/PF 2%-1:200K
10 VIAL (ML) INJECTION ONCE
Status: ACTIVE | OUTPATIENT
Start: 2021-01-01 | End: 2021-01-01

## 2021-01-01 RX ADMIN — NOREPINEPHRINE BITARTRATE 20 MCG/MIN: 1 INJECTION, SOLUTION, CONCENTRATE INTRAVENOUS at 22:00

## 2021-01-01 RX ADMIN — DOCUSATE SODIUM 50 MG AND SENNOSIDES 8.6 MG 2 TABLET: 8.6; 5 TABLET, FILM COATED ORAL at 17:34

## 2021-01-01 RX ADMIN — DEXMEDETOMIDINE 1.5 MCG/KG/HR: 200 INJECTION, SOLUTION INTRAVENOUS at 00:47

## 2021-01-01 RX ADMIN — PROPOFOL 55 MCG/KG/MIN: 10 INJECTION, EMULSION INTRAVENOUS at 20:56

## 2021-01-01 RX ADMIN — DEXMEDETOMIDINE 1 MCG/KG/HR: 200 INJECTION, SOLUTION INTRAVENOUS at 20:26

## 2021-01-01 RX ADMIN — Medication 100 MG: at 05:08

## 2021-01-01 RX ADMIN — ALBUTEROL SULFATE 2.5 MG: 2.5 SOLUTION RESPIRATORY (INHALATION) at 07:58

## 2021-01-01 RX ADMIN — OLANZAPINE 5 MG: 5 TABLET, FILM COATED ORAL at 17:12

## 2021-01-01 RX ADMIN — SODIUM CHLORIDE 3 G: 900 INJECTION INTRAVENOUS at 00:09

## 2021-01-01 RX ADMIN — ALBUMIN (HUMAN) 50 G: 5 SOLUTION INTRAVENOUS at 18:48

## 2021-01-01 RX ADMIN — AMOXICILLIN AND CLAVULANATE POTASSIUM 1 TABLET: 875; 125 TABLET, FILM COATED ORAL at 05:18

## 2021-01-01 RX ADMIN — PROPOFOL 50 MCG/KG/MIN: 10 INJECTION, EMULSION INTRAVENOUS at 07:41

## 2021-01-01 RX ADMIN — DEXMEDETOMIDINE 1.2 MCG/KG/HR: 200 INJECTION, SOLUTION INTRAVENOUS at 23:52

## 2021-01-01 RX ADMIN — DEXMEDETOMIDINE 0.7 MCG/KG/HR: 200 INJECTION, SOLUTION INTRAVENOUS at 12:50

## 2021-01-01 RX ADMIN — PIPERACILLIN AND TAZOBACTAM 4.5 G: 4; .5 INJECTION, POWDER, LYOPHILIZED, FOR SOLUTION INTRAVENOUS; PARENTERAL at 13:54

## 2021-01-01 RX ADMIN — PHYTONADIONE 10 MG: 5 TABLET ORAL at 05:20

## 2021-01-01 RX ADMIN — MIDODRINE HYDROCHLORIDE 10 MG: 5 TABLET ORAL at 17:23

## 2021-01-01 RX ADMIN — LACTULOSE 30 ML: 20 SOLUTION ORAL at 05:21

## 2021-01-01 RX ADMIN — VASOPRESSIN 0.03 UNITS/MIN: 20 INJECTION INTRAVENOUS at 17:31

## 2021-01-01 RX ADMIN — SODIUM CHLORIDE 3 G: 900 INJECTION INTRAVENOUS at 05:23

## 2021-01-01 RX ADMIN — LORAZEPAM 1 MG: 2 INJECTION INTRAMUSCULAR; INTRAVENOUS at 20:04

## 2021-01-01 RX ADMIN — FOLIC ACID 1 MG: 1 TABLET ORAL at 05:20

## 2021-01-01 RX ADMIN — NOREPINEPHRINE BITARTRATE 10 MCG/MIN: 1 INJECTION, SOLUTION, CONCENTRATE INTRAVENOUS at 09:00

## 2021-01-01 RX ADMIN — PROPOFOL 30 MCG/KG/MIN: 10 INJECTION, EMULSION INTRAVENOUS at 15:00

## 2021-01-01 RX ADMIN — FENTANYL CITRATE 300 MCG/HR: 50 INJECTION, SOLUTION INTRAMUSCULAR; INTRAVENOUS at 06:25

## 2021-01-01 RX ADMIN — BACITRACIN ZINC: 500 OINTMENT TOPICAL at 12:29

## 2021-01-01 RX ADMIN — FENTANYL CITRATE 2500 MCG: 50 INJECTION, SOLUTION INTRAMUSCULAR; INTRAVENOUS at 17:53

## 2021-01-01 RX ADMIN — LACTULOSE 30 ML: 20 SOLUTION ORAL at 11:08

## 2021-01-01 RX ADMIN — MIDODRINE HYDROCHLORIDE 10 MG: 5 TABLET ORAL at 08:16

## 2021-01-01 RX ADMIN — PIPERACILLIN AND TAZOBACTAM 4.5 G: 4; .5 INJECTION, POWDER, LYOPHILIZED, FOR SOLUTION INTRAVENOUS; PARENTERAL at 12:20

## 2021-01-01 RX ADMIN — PIPERACILLIN AND TAZOBACTAM 4.5 G: 4; .5 INJECTION, POWDER, LYOPHILIZED, FOR SOLUTION INTRAVENOUS; PARENTERAL at 12:19

## 2021-01-01 RX ADMIN — OLANZAPINE 5 MG: 5 TABLET, FILM COATED ORAL at 18:26

## 2021-01-01 RX ADMIN — LACTULOSE 30 ML: 20 SOLUTION ORAL at 12:59

## 2021-01-01 RX ADMIN — PROPOFOL 70 MCG/KG/MIN: 10 INJECTION, EMULSION INTRAVENOUS at 01:37

## 2021-01-01 RX ADMIN — DEXMEDETOMIDINE 0.6 MCG/KG/HR: 200 INJECTION, SOLUTION INTRAVENOUS at 08:52

## 2021-01-01 RX ADMIN — ENOXAPARIN SODIUM 40 MG: 40 INJECTION SUBCUTANEOUS at 17:57

## 2021-01-01 RX ADMIN — Medication 200 MCG/HR: at 14:19

## 2021-01-01 RX ADMIN — POTASSIUM BICARBONATE 50 MEQ: 978 TABLET, EFFERVESCENT ORAL at 05:20

## 2021-01-01 RX ADMIN — ENOXAPARIN SODIUM 40 MG: 40 INJECTION SUBCUTANEOUS at 05:07

## 2021-01-01 RX ADMIN — SODIUM CHLORIDE 3 G: 900 INJECTION INTRAVENOUS at 18:11

## 2021-01-01 RX ADMIN — FOLIC ACID 1 MG: 1 TABLET ORAL at 05:18

## 2021-01-01 RX ADMIN — DEXMEDETOMIDINE 0.6 MCG/KG/HR: 200 INJECTION, SOLUTION INTRAVENOUS at 20:26

## 2021-01-01 RX ADMIN — MICAFUNGIN SODIUM 100 MG: 100 INJECTION, POWDER, LYOPHILIZED, FOR SOLUTION INTRAVENOUS at 06:17

## 2021-01-01 RX ADMIN — VASOPRESSIN 0.03 UNITS/MIN: 20 INJECTION INTRAVENOUS at 20:53

## 2021-01-01 RX ADMIN — FENTANYL CITRATE 100 MCG: 50 INJECTION, SOLUTION INTRAMUSCULAR; INTRAVENOUS at 07:21

## 2021-01-01 RX ADMIN — FUROSEMIDE 20 MG: 20 TABLET ORAL at 08:28

## 2021-01-01 RX ADMIN — POTASSIUM BICARBONATE 50 MEQ: 978 TABLET, EFFERVESCENT ORAL at 05:09

## 2021-01-01 RX ADMIN — FUROSEMIDE 80 MG: 10 INJECTION, SOLUTION INTRAMUSCULAR; INTRAVENOUS at 17:13

## 2021-01-01 RX ADMIN — SCOPALAMINE 1 PATCH: 1 PATCH, EXTENDED RELEASE TRANSDERMAL at 13:47

## 2021-01-01 RX ADMIN — FAMOTIDINE 20 MG: 10 INJECTION INTRAVENOUS at 05:57

## 2021-01-01 RX ADMIN — MIDODRINE HYDROCHLORIDE 10 MG: 5 TABLET ORAL at 10:25

## 2021-01-01 RX ADMIN — PROPOFOL 50 MCG/KG/MIN: 10 INJECTION, EMULSION INTRAVENOUS at 14:37

## 2021-01-01 RX ADMIN — PIPERACILLIN AND TAZOBACTAM 4.5 G: 4; .5 INJECTION, POWDER, LYOPHILIZED, FOR SOLUTION INTRAVENOUS; PARENTERAL at 20:36

## 2021-01-01 RX ADMIN — SODIUM CHLORIDE 3 G: 900 INJECTION INTRAVENOUS at 23:48

## 2021-01-01 RX ADMIN — DEXMEDETOMIDINE 0.2 MCG/KG/HR: 200 INJECTION, SOLUTION INTRAVENOUS at 10:08

## 2021-01-01 RX ADMIN — LACTULOSE 30 ML: 20 SOLUTION ORAL at 05:07

## 2021-01-01 RX ADMIN — AMOXICILLIN AND CLAVULANATE POTASSIUM 1 TABLET: 875; 125 TABLET, FILM COATED ORAL at 17:11

## 2021-01-01 RX ADMIN — FENTANYL CITRATE 200 MCG: 50 INJECTION, SOLUTION INTRAMUSCULAR; INTRAVENOUS at 11:27

## 2021-01-01 RX ADMIN — PROPOFOL 70 MCG/KG/MIN: 10 INJECTION, EMULSION INTRAVENOUS at 11:04

## 2021-01-01 RX ADMIN — ENOXAPARIN SODIUM 40 MG: 40 INJECTION SUBCUTANEOUS at 05:09

## 2021-01-01 RX ADMIN — LACTULOSE 30 ML: 20 SOLUTION ORAL at 05:08

## 2021-01-01 RX ADMIN — PREDNISOLONE 39.9 MG: 15 SOLUTION ORAL at 21:49

## 2021-01-01 RX ADMIN — LACTULOSE 30 ML: 20 SOLUTION ORAL at 11:05

## 2021-01-01 RX ADMIN — ACETAMINOPHEN 650 MG: 325 TABLET, FILM COATED ORAL at 17:07

## 2021-01-01 RX ADMIN — FENTANYL CITRATE 50 MCG/HR: 50 INJECTION, SOLUTION INTRAMUSCULAR; INTRAVENOUS at 01:56

## 2021-01-01 RX ADMIN — DEXMEDETOMIDINE 1.5 MCG/KG/HR: 200 INJECTION, SOLUTION INTRAVENOUS at 05:21

## 2021-01-01 RX ADMIN — LACTULOSE 30 ML: 20 SOLUTION ORAL at 12:17

## 2021-01-01 RX ADMIN — DEXMEDETOMIDINE 0.8 MCG/KG/HR: 200 INJECTION, SOLUTION INTRAVENOUS at 03:34

## 2021-01-01 RX ADMIN — DEXMEDETOMIDINE 0.3 MCG/KG/HR: 200 INJECTION, SOLUTION INTRAVENOUS at 03:30

## 2021-01-01 RX ADMIN — PROPOFOL 70 MCG/KG/MIN: 10 INJECTION, EMULSION INTRAVENOUS at 07:46

## 2021-01-01 RX ADMIN — PROPOFOL 70 MCG/KG/MIN: 10 INJECTION, EMULSION INTRAVENOUS at 13:10

## 2021-01-01 RX ADMIN — FENTANYL CITRATE 100 MCG: 50 INJECTION, SOLUTION INTRAMUSCULAR; INTRAVENOUS at 18:00

## 2021-01-01 RX ADMIN — LORAZEPAM 2 MG: 2 INJECTION INTRAMUSCULAR; INTRAVENOUS at 22:15

## 2021-01-01 RX ADMIN — NOREPINEPHRINE BITARTRATE 10 MCG/MIN: 1 INJECTION INTRAVENOUS at 11:28

## 2021-01-01 RX ADMIN — VANCOMYCIN HYDROCHLORIDE 3000 MG: 500 INJECTION, POWDER, LYOPHILIZED, FOR SOLUTION INTRAVENOUS at 08:46

## 2021-01-01 RX ADMIN — Medication 100 MCG/HR: at 18:07

## 2021-01-01 RX ADMIN — ACETAMINOPHEN 650 MG: 325 TABLET, FILM COATED ORAL at 01:18

## 2021-01-01 RX ADMIN — FENTANYL CITRATE 150 MCG/HR: 50 INJECTION, SOLUTION INTRAMUSCULAR; INTRAVENOUS at 03:35

## 2021-01-01 RX ADMIN — NOREPINEPHRINE BITARTRATE 13 MCG/MIN: 1 INJECTION, SOLUTION, CONCENTRATE INTRAVENOUS at 12:00

## 2021-01-01 RX ADMIN — LORAZEPAM 1 MG: 2 INJECTION INTRAMUSCULAR; INTRAVENOUS at 14:15

## 2021-01-01 RX ADMIN — PROPOFOL 70 MCG/KG/MIN: 10 INJECTION, EMULSION INTRAVENOUS at 18:43

## 2021-01-01 RX ADMIN — MIDODRINE HYDROCHLORIDE 10 MG: 5 TABLET ORAL at 06:41

## 2021-01-01 RX ADMIN — FAMOTIDINE 20 MG: 20 TABLET ORAL at 05:21

## 2021-01-01 RX ADMIN — ACETAZOLAMIDE 250 MG: 250 TABLET ORAL at 16:21

## 2021-01-01 RX ADMIN — FENTANYL CITRATE 2500 MCG: 50 INJECTION, SOLUTION INTRAMUSCULAR; INTRAVENOUS at 00:44

## 2021-01-01 RX ADMIN — Medication 2500 MCG: at 03:13

## 2021-01-01 RX ADMIN — FENTANYL CITRATE 300 MCG: 50 INJECTION, SOLUTION INTRAMUSCULAR; INTRAVENOUS at 19:55

## 2021-01-01 RX ADMIN — SODIUM PHOSPHATE, MONOBASIC, MONOHYDRATE AND SODIUM PHOSPHATE, DIBASIC, ANHYDROUS 30 MMOL: 276; 142 INJECTION, SOLUTION INTRAVENOUS at 17:35

## 2021-01-01 RX ADMIN — PREDNISOLONE 39.9 MG: 15 SOLUTION ORAL at 21:15

## 2021-01-01 RX ADMIN — FAMOTIDINE 20 MG: 10 INJECTION INTRAVENOUS at 21:36

## 2021-01-01 RX ADMIN — Medication 100 MG: at 05:18

## 2021-01-01 RX ADMIN — Medication 100 MG: at 06:00

## 2021-01-01 RX ADMIN — ROCURONIUM BROMIDE 100 MG: 10 INJECTION, SOLUTION INTRAVENOUS at 20:05

## 2021-01-01 RX ADMIN — ENOXAPARIN SODIUM 40 MG: 40 INJECTION SUBCUTANEOUS at 05:13

## 2021-01-01 RX ADMIN — LACTULOSE 30 ML: 20 SOLUTION ORAL at 08:28

## 2021-01-01 RX ADMIN — POTASSIUM BICARBONATE 50 MEQ: 978 TABLET, EFFERVESCENT ORAL at 05:07

## 2021-01-01 RX ADMIN — PROPOFOL 25 MCG/KG/MIN: 10 INJECTION, EMULSION INTRAVENOUS at 12:00

## 2021-01-01 RX ADMIN — HALOPERIDOL LACTATE 5 MG: 5 INJECTION, SOLUTION INTRAMUSCULAR at 15:43

## 2021-01-01 RX ADMIN — LACTULOSE 30 ML: 20 SOLUTION ORAL at 12:24

## 2021-01-01 RX ADMIN — OLANZAPINE 5 MG: 5 TABLET, FILM COATED ORAL at 17:14

## 2021-01-01 RX ADMIN — POTASSIUM PHOSPHATE, MONOBASIC AND POTASSIUM PHOSPHATE, DIBASIC 15 MMOL: 224; 236 INJECTION, SOLUTION, CONCENTRATE INTRAVENOUS at 09:31

## 2021-01-01 RX ADMIN — LACTULOSE 30 ML: 20 SOLUTION ORAL at 17:15

## 2021-01-01 RX ADMIN — ACETAMINOPHEN 650 MG: 325 TABLET ORAL at 05:13

## 2021-01-01 RX ADMIN — PROPOFOL 70 MCG/KG/MIN: 10 INJECTION, EMULSION INTRAVENOUS at 21:30

## 2021-01-01 RX ADMIN — PROPOFOL 5 MCG/KG/MIN: 10 INJECTION, EMULSION INTRAVENOUS at 09:18

## 2021-01-01 RX ADMIN — MIDODRINE HYDROCHLORIDE 10 MG: 5 TABLET ORAL at 09:54

## 2021-01-01 RX ADMIN — ENOXAPARIN SODIUM 40 MG: 40 INJECTION SUBCUTANEOUS at 17:11

## 2021-01-01 RX ADMIN — DEXMEDETOMIDINE 0.3 MCG/KG/HR: 200 INJECTION, SOLUTION INTRAVENOUS at 18:11

## 2021-01-01 RX ADMIN — COCAINE HYDROCHLORIDE 4 ML: 40 SOLUTION NASAL at 12:29

## 2021-01-01 RX ADMIN — PROPOFOL 35 MCG/KG/MIN: 10 INJECTION, EMULSION INTRAVENOUS at 14:44

## 2021-01-01 RX ADMIN — Medication 200 MCG/HR: at 04:36

## 2021-01-01 RX ADMIN — FUROSEMIDE 80 MG: 10 INJECTION, SOLUTION INTRAMUSCULAR; INTRAVENOUS at 05:23

## 2021-01-01 RX ADMIN — DOXYCYCLINE 100 MG: 100 TABLET, FILM COATED ORAL at 17:12

## 2021-01-01 RX ADMIN — PROPOFOL 60 MCG/KG/MIN: 10 INJECTION, EMULSION INTRAVENOUS at 20:04

## 2021-01-01 RX ADMIN — PIPERACILLIN AND TAZOBACTAM 4.5 G: 4; .5 INJECTION, POWDER, LYOPHILIZED, FOR SOLUTION INTRAVENOUS; PARENTERAL at 12:23

## 2021-01-01 RX ADMIN — MIDODRINE HYDROCHLORIDE 10 MG: 5 TABLET ORAL at 17:48

## 2021-01-01 RX ADMIN — PROPOFOL 50 MCG/KG/MIN: 10 INJECTION, EMULSION INTRAVENOUS at 02:24

## 2021-01-01 RX ADMIN — PROPOFOL 55 MCG/KG/MIN: 10 INJECTION, EMULSION INTRAVENOUS at 23:09

## 2021-01-01 RX ADMIN — DEXMEDETOMIDINE 1.5 MCG/KG/HR: 200 INJECTION, SOLUTION INTRAVENOUS at 02:56

## 2021-01-01 RX ADMIN — MORPHINE SULFATE 5 MG: 10 INJECTION INTRAVENOUS at 13:47

## 2021-01-01 RX ADMIN — ENOXAPARIN SODIUM 40 MG: 40 INJECTION SUBCUTANEOUS at 05:17

## 2021-01-01 RX ADMIN — ALBUMIN (HUMAN) 50 G: 5 SOLUTION INTRAVENOUS at 05:14

## 2021-01-01 RX ADMIN — POTASSIUM BICARBONATE 50 MEQ: 978 TABLET, EFFERVESCENT ORAL at 05:13

## 2021-01-01 RX ADMIN — PREDNISOLONE 39.9 MG: 15 SOLUTION ORAL at 19:38

## 2021-01-01 RX ADMIN — VASOPRESSIN 0.03 UNITS/MIN: 20 INJECTION INTRAVENOUS at 06:23

## 2021-01-01 RX ADMIN — DEXMEDETOMIDINE 0.5 MCG/KG/HR: 200 INJECTION, SOLUTION INTRAVENOUS at 06:27

## 2021-01-01 RX ADMIN — PROPOFOL 70 MCG/KG/MIN: 10 INJECTION, EMULSION INTRAVENOUS at 17:12

## 2021-01-01 RX ADMIN — FUROSEMIDE 40 MG: 10 INJECTION, SOLUTION INTRAMUSCULAR; INTRAVENOUS at 05:09

## 2021-01-01 RX ADMIN — PROPOFOL 45 MCG/KG/MIN: 10 INJECTION, EMULSION INTRAVENOUS at 23:06

## 2021-01-01 RX ADMIN — PIPERACILLIN AND TAZOBACTAM 4.5 G: 4; .5 INJECTION, POWDER, LYOPHILIZED, FOR SOLUTION INTRAVENOUS; PARENTERAL at 20:35

## 2021-01-01 RX ADMIN — LORAZEPAM 1 MG: 2 INJECTION INTRAMUSCULAR; INTRAVENOUS at 16:50

## 2021-01-01 RX ADMIN — PROPOFOL 50 MCG/KG/MIN: 10 INJECTION, EMULSION INTRAVENOUS at 05:13

## 2021-01-01 RX ADMIN — FAMOTIDINE 20 MG: 10 INJECTION INTRAVENOUS at 18:26

## 2021-01-01 RX ADMIN — FENTANYL CITRATE 100 MCG: 50 INJECTION, SOLUTION INTRAMUSCULAR; INTRAVENOUS at 14:17

## 2021-01-01 RX ADMIN — PROPOFOL 30 MCG/KG/MIN: 10 INJECTION, EMULSION INTRAVENOUS at 17:22

## 2021-01-01 RX ADMIN — FUROSEMIDE 20 MG: 10 INJECTION, SOLUTION INTRAMUSCULAR; INTRAVENOUS at 11:35

## 2021-01-01 RX ADMIN — PROPOFOL 30 MCG/KG/MIN: 10 INJECTION, EMULSION INTRAVENOUS at 02:26

## 2021-01-01 RX ADMIN — FUROSEMIDE 40 MG: 10 INJECTION, SOLUTION INTRAMUSCULAR; INTRAVENOUS at 13:25

## 2021-01-01 RX ADMIN — DEXMEDETOMIDINE 1.5 MCG/KG/HR: 200 INJECTION, SOLUTION INTRAVENOUS at 19:41

## 2021-01-01 RX ADMIN — PROPOFOL 60 MCG/KG/MIN: 10 INJECTION, EMULSION INTRAVENOUS at 18:09

## 2021-01-01 RX ADMIN — DOXYCYCLINE 100 MG: 100 TABLET, FILM COATED ORAL at 17:15

## 2021-01-01 RX ADMIN — PIPERACILLIN AND TAZOBACTAM 4.5 G: 4; .5 INJECTION, POWDER, LYOPHILIZED, FOR SOLUTION INTRAVENOUS; PARENTERAL at 20:33

## 2021-01-01 RX ADMIN — AMOXICILLIN AND CLAVULANATE POTASSIUM 1 TABLET: 875; 125 TABLET, FILM COATED ORAL at 17:34

## 2021-01-01 RX ADMIN — Medication 100 MG: at 05:47

## 2021-01-01 RX ADMIN — VASOPRESSIN 0.03 UNITS/MIN: 20 INJECTION INTRAVENOUS at 04:47

## 2021-01-01 RX ADMIN — FAMOTIDINE 20 MG: 20 TABLET ORAL at 05:08

## 2021-01-01 RX ADMIN — POTASSIUM BICARBONATE 50 MEQ: 978 TABLET, EFFERVESCENT ORAL at 05:47

## 2021-01-01 RX ADMIN — SODIUM CHLORIDE 3 G: 900 INJECTION INTRAVENOUS at 05:12

## 2021-01-01 RX ADMIN — FAMOTIDINE 20 MG: 20 TABLET ORAL at 05:18

## 2021-01-01 RX ADMIN — FOLIC ACID 1 MG: 1 TABLET ORAL at 05:07

## 2021-01-01 RX ADMIN — PROPOFOL 70 MCG/KG/MIN: 10 INJECTION, EMULSION INTRAVENOUS at 09:46

## 2021-01-01 RX ADMIN — PREDNISOLONE 39.9 MG: 15 SOLUTION ORAL at 19:53

## 2021-01-01 RX ADMIN — DEXMEDETOMIDINE 1 MCG/KG/HR: 200 INJECTION, SOLUTION INTRAVENOUS at 00:41

## 2021-01-01 RX ADMIN — DIBASIC SODIUM PHOSPHATE, MONOBASIC POTASSIUM PHOSPHATE AND MONOBASIC SODIUM PHOSPHATE 500 MG: 852; 155; 130 TABLET ORAL at 11:24

## 2021-01-01 RX ADMIN — MIDODRINE HYDROCHLORIDE 10 MG: 5 TABLET ORAL at 10:05

## 2021-01-01 RX ADMIN — VASOPRESSIN 0.03 UNITS/MIN: 20 INJECTION INTRAVENOUS at 15:11

## 2021-01-01 RX ADMIN — FUROSEMIDE 20 MG: 20 TABLET ORAL at 05:21

## 2021-01-01 RX ADMIN — MICAFUNGIN SODIUM 100 MG: 100 INJECTION, POWDER, LYOPHILIZED, FOR SOLUTION INTRAVENOUS at 05:55

## 2021-01-01 RX ADMIN — PIPERACILLIN AND TAZOBACTAM 4.5 G: 4; .5 INJECTION, POWDER, LYOPHILIZED, FOR SOLUTION INTRAVENOUS; PARENTERAL at 21:18

## 2021-01-01 RX ADMIN — AMOXICILLIN AND CLAVULANATE POTASSIUM 1 TABLET: 875; 125 TABLET, FILM COATED ORAL at 17:15

## 2021-01-01 RX ADMIN — FUROSEMIDE 40 MG: 10 INJECTION, SOLUTION INTRAMUSCULAR; INTRAVENOUS at 13:04

## 2021-01-01 RX ADMIN — FAMOTIDINE 20 MG: 10 INJECTION INTRAVENOUS at 17:12

## 2021-01-01 RX ADMIN — FENTANYL CITRATE 2500 MCG: 50 INJECTION, SOLUTION INTRAMUSCULAR; INTRAVENOUS at 18:31

## 2021-01-01 RX ADMIN — PROPOFOL 55 MCG/KG/MIN: 10 INJECTION, EMULSION INTRAVENOUS at 04:26

## 2021-01-01 RX ADMIN — ACETAMINOPHEN 650 MG: 325 TABLET, FILM COATED ORAL at 02:16

## 2021-01-01 RX ADMIN — ACETAZOLAMIDE 250 MG: 250 TABLET ORAL at 08:51

## 2021-01-01 RX ADMIN — FOLIC ACID 1 MG: 1 TABLET ORAL at 05:35

## 2021-01-01 RX ADMIN — DEXMEDETOMIDINE 0.5 MCG/KG/HR: 200 INJECTION, SOLUTION INTRAVENOUS at 10:56

## 2021-01-01 RX ADMIN — INSULIN HUMAN 1 UNITS: 100 INJECTION, SOLUTION PARENTERAL at 18:00

## 2021-01-01 RX ADMIN — FAMOTIDINE 20 MG: 20 TABLET ORAL at 05:47

## 2021-01-01 RX ADMIN — LINEZOLID 600 MG: 600 TABLET, FILM COATED ORAL at 05:09

## 2021-01-01 RX ADMIN — POTASSIUM CHLORIDE 20 MEQ: 14.9 INJECTION, SOLUTION INTRAVENOUS at 07:11

## 2021-01-01 RX ADMIN — OLANZAPINE 5 MG: 5 TABLET, FILM COATED ORAL at 17:38

## 2021-01-01 RX ADMIN — DEXMEDETOMIDINE 1 MCG/KG/HR: 200 INJECTION, SOLUTION INTRAVENOUS at 08:24

## 2021-01-01 RX ADMIN — PIPERACILLIN AND TAZOBACTAM 4.5 G: 4; .5 INJECTION, POWDER, LYOPHILIZED, FOR SOLUTION INTRAVENOUS; PARENTERAL at 04:09

## 2021-01-01 RX ADMIN — FOLIC ACID 1 MG: 1 TABLET ORAL at 05:13

## 2021-01-01 RX ADMIN — FOLIC ACID 1 MG: 1 TABLET ORAL at 05:21

## 2021-01-01 RX ADMIN — PROPOFOL 40 MCG/KG/MIN: 10 INJECTION, EMULSION INTRAVENOUS at 04:54

## 2021-01-01 RX ADMIN — LACTULOSE 30 ML: 20 SOLUTION ORAL at 17:11

## 2021-01-01 RX ADMIN — FENTANYL CITRATE 100 MCG: 50 INJECTION, SOLUTION INTRAMUSCULAR; INTRAVENOUS at 23:41

## 2021-01-01 RX ADMIN — DEXMEDETOMIDINE 0.9 MCG/KG/HR: 200 INJECTION, SOLUTION INTRAVENOUS at 13:36

## 2021-01-01 RX ADMIN — DOXYCYCLINE 100 MG: 100 TABLET, FILM COATED ORAL at 05:46

## 2021-01-01 RX ADMIN — NOREPINEPHRINE BITARTRATE 15 MCG/MIN: 1 INJECTION, SOLUTION, CONCENTRATE INTRAVENOUS at 15:37

## 2021-01-01 RX ADMIN — KETAMINE HYDROCHLORIDE 50 MG: 50 INJECTION INTRAMUSCULAR; INTRAVENOUS at 11:21

## 2021-01-01 RX ADMIN — MIDODRINE HYDROCHLORIDE 10 MG: 5 TABLET ORAL at 16:50

## 2021-01-01 RX ADMIN — MICAFUNGIN SODIUM 100 MG: 100 INJECTION, POWDER, LYOPHILIZED, FOR SOLUTION INTRAVENOUS at 05:18

## 2021-01-01 RX ADMIN — PROPOFOL 20 MCG/KG/MIN: 10 INJECTION, EMULSION INTRAVENOUS at 17:12

## 2021-01-01 RX ADMIN — FUROSEMIDE 80 MG: 10 INJECTION, SOLUTION INTRAMUSCULAR; INTRAVENOUS at 17:10

## 2021-01-01 RX ADMIN — PIPERACILLIN AND TAZOBACTAM 4.5 G: 4; .5 INJECTION, POWDER, LYOPHILIZED, FOR SOLUTION INTRAVENOUS; PARENTERAL at 21:37

## 2021-01-01 RX ADMIN — PREDNISOLONE 39.9 MG: 15 SOLUTION ORAL at 20:43

## 2021-01-01 RX ADMIN — AMOXICILLIN AND CLAVULANATE POTASSIUM 1 TABLET: 875; 125 TABLET, FILM COATED ORAL at 05:07

## 2021-01-01 RX ADMIN — INSULIN HUMAN 1 UNITS: 100 INJECTION, SOLUTION PARENTERAL at 05:59

## 2021-01-01 RX ADMIN — FENTANYL CITRATE 250 MCG/HR: 50 INJECTION, SOLUTION INTRAMUSCULAR; INTRAVENOUS at 22:12

## 2021-01-01 RX ADMIN — Medication 100 MG: at 05:21

## 2021-01-01 RX ADMIN — VASOPRESSIN 0.03 UNITS/MIN: 20 INJECTION INTRAVENOUS at 05:45

## 2021-01-01 RX ADMIN — LINEZOLID 600 MG: 600 INJECTION, SOLUTION INTRAVENOUS at 17:31

## 2021-01-01 RX ADMIN — FOLIC ACID 1 MG: 1 TABLET ORAL at 12:42

## 2021-01-01 RX ADMIN — ENOXAPARIN SODIUM 40 MG: 40 INJECTION SUBCUTANEOUS at 17:43

## 2021-01-01 RX ADMIN — PIPERACILLIN AND TAZOBACTAM 4.5 G: 4; .5 INJECTION, POWDER, LYOPHILIZED, FOR SOLUTION INTRAVENOUS; PARENTERAL at 07:47

## 2021-01-01 RX ADMIN — HALOPERIDOL LACTATE 5 MG: 5 INJECTION, SOLUTION INTRAMUSCULAR at 20:04

## 2021-01-01 RX ADMIN — FOLIC ACID 1 MG: 1 TABLET ORAL at 05:08

## 2021-01-01 RX ADMIN — SODIUM CHLORIDE 3 G: 900 INJECTION INTRAVENOUS at 14:48

## 2021-01-01 RX ADMIN — SODIUM CHLORIDE 3 G: 900 INJECTION INTRAVENOUS at 17:19

## 2021-01-01 RX ADMIN — PROPOFOL 30 MCG/KG/MIN: 10 INJECTION, EMULSION INTRAVENOUS at 09:39

## 2021-01-01 RX ADMIN — ENOXAPARIN SODIUM 40 MG: 40 INJECTION SUBCUTANEOUS at 17:48

## 2021-01-01 RX ADMIN — MIDODRINE HYDROCHLORIDE 10 MG: 5 TABLET ORAL at 07:32

## 2021-01-01 RX ADMIN — ENOXAPARIN SODIUM 40 MG: 40 INJECTION SUBCUTANEOUS at 05:21

## 2021-01-01 RX ADMIN — PROPOFOL 40 MCG/KG/MIN: 10 INJECTION, EMULSION INTRAVENOUS at 11:24

## 2021-01-01 RX ADMIN — ALBUMIN (HUMAN) 50 G: 5 SOLUTION INTRAVENOUS at 10:03

## 2021-01-01 RX ADMIN — PROPOFOL 50 MCG/KG/MIN: 10 INJECTION, EMULSION INTRAVENOUS at 23:37

## 2021-01-01 RX ADMIN — Medication 100 MG: at 05:20

## 2021-01-01 RX ADMIN — INSULIN HUMAN 2 UNITS: 100 INJECTION, SOLUTION PARENTERAL at 00:52

## 2021-01-01 RX ADMIN — FOLIC ACID 1 MG: 1 TABLET ORAL at 05:47

## 2021-01-01 RX ADMIN — PIPERACILLIN AND TAZOBACTAM 4.5 G: 4; .5 INJECTION, POWDER, LYOPHILIZED, FOR SOLUTION INTRAVENOUS; PARENTERAL at 20:56

## 2021-01-01 RX ADMIN — OLANZAPINE 5 MG: 5 TABLET, FILM COATED ORAL at 17:23

## 2021-01-01 RX ADMIN — SPIRONOLACTONE 50 MG: 25 TABLET ORAL at 05:20

## 2021-01-01 RX ADMIN — PROPOFOL 30 MCG/KG/MIN: 10 INJECTION, EMULSION INTRAVENOUS at 12:42

## 2021-01-01 RX ADMIN — NOREPINEPHRINE BITARTRATE 4 MCG/MIN: 1 INJECTION, SOLUTION, CONCENTRATE INTRAVENOUS at 01:29

## 2021-01-01 RX ADMIN — SPIRONOLACTONE 50 MG: 25 TABLET ORAL at 08:28

## 2021-01-01 RX ADMIN — ENOXAPARIN SODIUM 40 MG: 40 INJECTION SUBCUTANEOUS at 05:12

## 2021-01-01 RX ADMIN — DOCUSATE SODIUM 50 MG AND SENNOSIDES 8.6 MG 2 TABLET: 8.6; 5 TABLET, FILM COATED ORAL at 05:21

## 2021-01-01 RX ADMIN — LINEZOLID 600 MG: 600 INJECTION, SOLUTION INTRAVENOUS at 05:13

## 2021-01-01 RX ADMIN — NOREPINEPHRINE BITARTRATE 20 MCG/MIN: 1 INJECTION, SOLUTION, CONCENTRATE INTRAVENOUS at 03:14

## 2021-01-01 RX ADMIN — POTASSIUM BICARBONATE 50 MEQ: 978 TABLET, EFFERVESCENT ORAL at 17:15

## 2021-01-01 RX ADMIN — NOREPINEPHRINE BITARTRATE 17 MCG/MIN: 1 INJECTION, SOLUTION, CONCENTRATE INTRAVENOUS at 19:56

## 2021-01-01 RX ADMIN — DEXTROSE MONOHYDRATE: 50 INJECTION, SOLUTION INTRAVENOUS at 03:23

## 2021-01-01 RX ADMIN — DEXMEDETOMIDINE 1.2 MCG/KG/HR: 200 INJECTION, SOLUTION INTRAVENOUS at 00:40

## 2021-01-01 RX ADMIN — Medication 100 MG: at 12:42

## 2021-01-01 RX ADMIN — Medication 100 MCG/HR: at 09:34

## 2021-01-01 RX ADMIN — ENOXAPARIN SODIUM 40 MG: 40 INJECTION SUBCUTANEOUS at 09:39

## 2021-01-01 RX ADMIN — FUROSEMIDE 80 MG: 10 INJECTION, SOLUTION INTRAMUSCULAR; INTRAVENOUS at 18:05

## 2021-01-01 RX ADMIN — ENOXAPARIN SODIUM 40 MG: 40 INJECTION SUBCUTANEOUS at 05:20

## 2021-01-01 RX ADMIN — FENTANYL CITRATE 2500 MCG: 50 INJECTION, SOLUTION INTRAMUSCULAR; INTRAVENOUS at 09:09

## 2021-01-01 RX ADMIN — POTASSIUM BICARBONATE 25 MEQ: 978 TABLET, EFFERVESCENT ORAL at 17:11

## 2021-01-01 RX ADMIN — CALCIUM GLUCONATE 1 G: 98 INJECTION, SOLUTION INTRAVENOUS at 11:00

## 2021-01-01 RX ADMIN — POTASSIUM BICARBONATE 50 MEQ: 978 TABLET, EFFERVESCENT ORAL at 05:58

## 2021-01-01 RX ADMIN — HALOPERIDOL LACTATE 5 MG: 5 INJECTION, SOLUTION INTRAMUSCULAR at 23:27

## 2021-01-01 RX ADMIN — FAMOTIDINE 20 MG: 20 TABLET ORAL at 17:35

## 2021-01-01 RX ADMIN — PROPOFOL 50 MCG/KG/MIN: 10 INJECTION, EMULSION INTRAVENOUS at 03:52

## 2021-01-01 RX ADMIN — FAMOTIDINE 20 MG: 20 TABLET ORAL at 17:23

## 2021-01-01 RX ADMIN — DOXYCYCLINE 100 MG: 100 INJECTION, POWDER, LYOPHILIZED, FOR SOLUTION INTRAVENOUS at 05:46

## 2021-01-01 RX ADMIN — MIDODRINE HYDROCHLORIDE 10 MG: 5 TABLET ORAL at 17:07

## 2021-01-01 RX ADMIN — ENOXAPARIN SODIUM 40 MG: 40 INJECTION SUBCUTANEOUS at 18:11

## 2021-01-01 RX ADMIN — PROPOFOL 60 MCG/KG/MIN: 10 INJECTION, EMULSION INTRAVENOUS at 23:03

## 2021-01-01 RX ADMIN — POTASSIUM BICARBONATE 50 MEQ: 978 TABLET, EFFERVESCENT ORAL at 12:18

## 2021-01-01 RX ADMIN — INSULIN HUMAN 1 UNITS: 100 INJECTION, SOLUTION PARENTERAL at 18:45

## 2021-01-01 RX ADMIN — LACTULOSE 30 ML: 20 SOLUTION ORAL at 17:57

## 2021-01-01 RX ADMIN — PROPOFOL 30 MCG/KG/MIN: 10 INJECTION, EMULSION INTRAVENOUS at 09:30

## 2021-01-01 RX ADMIN — PIPERACILLIN AND TAZOBACTAM 4.5 G: 4; .5 INJECTION, POWDER, LYOPHILIZED, FOR SOLUTION INTRAVENOUS; PARENTERAL at 04:38

## 2021-01-01 RX ADMIN — PROPOFOL 70 MCG/KG/MIN: 10 INJECTION, EMULSION INTRAVENOUS at 15:10

## 2021-01-01 RX ADMIN — SODIUM CHLORIDE 3 G: 900 INJECTION INTRAVENOUS at 14:51

## 2021-01-01 RX ADMIN — PROPOFOL 30 MCG/KG/MIN: 10 INJECTION, EMULSION INTRAVENOUS at 22:07

## 2021-01-01 RX ADMIN — PROPOFOL 45 MCG/KG/MIN: 10 INJECTION, EMULSION INTRAVENOUS at 11:28

## 2021-01-01 RX ADMIN — DEXMEDETOMIDINE 0.7 MCG/KG/HR: 200 INJECTION, SOLUTION INTRAVENOUS at 17:55

## 2021-01-01 RX ADMIN — FENTANYL CITRATE 100 MCG: 50 INJECTION, SOLUTION INTRAMUSCULAR; INTRAVENOUS at 00:54

## 2021-01-01 RX ADMIN — DEXMEDETOMIDINE 1.5 MCG/KG/HR: 200 INJECTION, SOLUTION INTRAVENOUS at 22:18

## 2021-01-01 RX ADMIN — PROPOFOL 50 MCG/KG/MIN: 10 INJECTION, EMULSION INTRAVENOUS at 07:14

## 2021-01-01 RX ADMIN — PROPOFOL 40 MCG/KG/MIN: 10 INJECTION, EMULSION INTRAVENOUS at 23:30

## 2021-01-01 RX ADMIN — DEXMEDETOMIDINE 0.5 MCG/KG/HR: 200 INJECTION, SOLUTION INTRAVENOUS at 21:44

## 2021-01-01 RX ADMIN — FUROSEMIDE 40 MG: 10 INJECTION, SOLUTION INTRAMUSCULAR; INTRAVENOUS at 05:35

## 2021-01-01 RX ADMIN — PROPOFOL 60 MCG/KG/MIN: 10 INJECTION, EMULSION INTRAVENOUS at 00:12

## 2021-01-01 RX ADMIN — ACETAZOLAMIDE 250 MG: 250 TABLET ORAL at 15:50

## 2021-01-01 RX ADMIN — NOREPINEPHRINE BITARTRATE 4 MCG/MIN: 1 INJECTION, SOLUTION, CONCENTRATE INTRAVENOUS at 20:26

## 2021-01-01 RX ADMIN — PREDNISOLONE 39.9 MG: 15 SOLUTION ORAL at 20:00

## 2021-01-01 RX ADMIN — NOREPINEPHRINE BITARTRATE 12 MCG/MIN: 1 INJECTION, SOLUTION, CONCENTRATE INTRAVENOUS at 21:07

## 2021-01-01 RX ADMIN — POTASSIUM BICARBONATE 50 MEQ: 978 TABLET, EFFERVESCENT ORAL at 05:35

## 2021-01-01 RX ADMIN — MIDODRINE HYDROCHLORIDE 10 MG: 5 TABLET ORAL at 12:17

## 2021-01-01 RX ADMIN — PROPOFOL 60 MCG/KG/MIN: 10 INJECTION, EMULSION INTRAVENOUS at 21:55

## 2021-01-01 RX ADMIN — PROPOFOL 70 MCG/KG/MIN: 10 INJECTION, EMULSION INTRAVENOUS at 11:48

## 2021-01-01 RX ADMIN — VASOPRESSIN 0.03 UNITS/MIN: 20 INJECTION INTRAVENOUS at 05:47

## 2021-01-01 RX ADMIN — POTASSIUM BICARBONATE 25 MEQ: 978 TABLET, EFFERVESCENT ORAL at 05:18

## 2021-01-01 RX ADMIN — PIPERACILLIN AND TAZOBACTAM 4.5 G: 4; .5 INJECTION, POWDER, LYOPHILIZED, FOR SOLUTION INTRAVENOUS; PARENTERAL at 04:34

## 2021-01-01 RX ADMIN — DEXMEDETOMIDINE 0.6 MCG/KG/HR: 200 INJECTION, SOLUTION INTRAVENOUS at 02:10

## 2021-01-01 RX ADMIN — Medication 100 MG: at 05:35

## 2021-01-01 RX ADMIN — ENOXAPARIN SODIUM 40 MG: 40 INJECTION SUBCUTANEOUS at 17:15

## 2021-01-01 RX ADMIN — FENTANYL CITRATE 100 MCG: 50 INJECTION, SOLUTION INTRAMUSCULAR; INTRAVENOUS at 06:29

## 2021-01-01 RX ADMIN — ACETAZOLAMIDE 250 MG: 250 TABLET ORAL at 05:09

## 2021-01-01 RX ADMIN — CALCIUM GLUCONATE 2 G: 98 INJECTION, SOLUTION INTRAVENOUS at 11:08

## 2021-01-01 RX ADMIN — DEXMEDETOMIDINE 1.5 MCG/KG/HR: 200 INJECTION, SOLUTION INTRAVENOUS at 17:12

## 2021-01-01 RX ADMIN — LINEZOLID 600 MG: 600 INJECTION, SOLUTION INTRAVENOUS at 06:40

## 2021-01-01 RX ADMIN — FOLIC ACID 1 MG: 5 INJECTION, SOLUTION INTRAMUSCULAR; INTRAVENOUS; SUBCUTANEOUS at 16:31

## 2021-01-01 RX ADMIN — ENOXAPARIN SODIUM 40 MG: 40 INJECTION SUBCUTANEOUS at 17:37

## 2021-01-01 RX ADMIN — DEXMEDETOMIDINE 0.6 MCG/KG/HR: 200 INJECTION, SOLUTION INTRAVENOUS at 02:32

## 2021-01-01 RX ADMIN — INSULIN HUMAN 1 UNITS: 100 INJECTION, SOLUTION PARENTERAL at 23:41

## 2021-01-01 RX ADMIN — LACTULOSE 30 ML: 20 SOLUTION ORAL at 05:35

## 2021-01-01 RX ADMIN — Medication 100 MG: at 05:13

## 2021-01-01 RX ADMIN — POTASSIUM BICARBONATE 50 MEQ: 978 TABLET, EFFERVESCENT ORAL at 19:37

## 2021-01-01 RX ADMIN — DEXMEDETOMIDINE 0.3 MCG/KG/HR: 200 INJECTION, SOLUTION INTRAVENOUS at 21:28

## 2021-01-01 RX ADMIN — FENTANYL CITRATE 200 MCG: 50 INJECTION, SOLUTION INTRAMUSCULAR; INTRAVENOUS at 03:25

## 2021-01-01 RX ADMIN — FOLIC ACID 1 MG: 1 TABLET ORAL at 05:58

## 2021-01-01 RX ADMIN — PROPOFOL 60 MCG/KG/MIN: 10 INJECTION, EMULSION INTRAVENOUS at 03:03

## 2021-01-01 RX ADMIN — PIPERACILLIN AND TAZOBACTAM 4.5 G: 4; .5 INJECTION, POWDER, LYOPHILIZED, FOR SOLUTION INTRAVENOUS; PARENTERAL at 05:57

## 2021-01-01 RX ADMIN — PIPERACILLIN AND TAZOBACTAM 4.5 G: 4; .5 INJECTION, POWDER, LYOPHILIZED, FOR SOLUTION INTRAVENOUS; PARENTERAL at 12:47

## 2021-01-01 RX ADMIN — PROPOFOL 30 MCG/KG/MIN: 10 INJECTION, EMULSION INTRAVENOUS at 20:02

## 2021-01-01 RX ADMIN — PROPOFOL 70 MCG/KG/MIN: 10 INJECTION, EMULSION INTRAVENOUS at 06:45

## 2021-01-01 RX ADMIN — IOHEXOL 100 ML: 350 INJECTION, SOLUTION INTRAVENOUS at 16:12

## 2021-01-01 RX ADMIN — PROPOFOL 70 MCG/KG/MIN: 10 INJECTION, EMULSION INTRAVENOUS at 09:06

## 2021-01-01 RX ADMIN — PROPOFOL 55 MCG/KG/MIN: 10 INJECTION, EMULSION INTRAVENOUS at 01:38

## 2021-01-01 RX ADMIN — PROPOFOL 30 MCG/KG/MIN: 10 INJECTION, EMULSION INTRAVENOUS at 19:55

## 2021-01-01 RX ADMIN — POTASSIUM PHOSPHATE, MONOBASIC AND POTASSIUM PHOSPHATE, DIBASIC 15 MMOL: 224; 236 INJECTION, SOLUTION, CONCENTRATE INTRAVENOUS at 09:41

## 2021-01-01 RX ADMIN — FENTANYL CITRATE 100 MCG: 50 INJECTION, SOLUTION INTRAMUSCULAR; INTRAVENOUS at 22:37

## 2021-01-01 RX ADMIN — FUROSEMIDE 80 MG: 10 INJECTION, SOLUTION INTRAMUSCULAR; INTRAVENOUS at 17:35

## 2021-01-01 RX ADMIN — ETOMIDATE 40 MG: 2 INJECTION INTRAVENOUS at 20:05

## 2021-01-01 RX ADMIN — PROPOFOL 30 MCG/KG/MIN: 10 INJECTION, EMULSION INTRAVENOUS at 10:12

## 2021-01-01 RX ADMIN — INSULIN HUMAN 1 UNITS: 100 INJECTION, SOLUTION PARENTERAL at 13:31

## 2021-01-01 RX ADMIN — LACTULOSE 30 ML: 20 SOLUTION ORAL at 05:18

## 2021-01-01 RX ADMIN — PROPOFOL 70 MCG/KG/MIN: 10 INJECTION, EMULSION INTRAVENOUS at 03:42

## 2021-01-01 RX ADMIN — LINEZOLID 600 MG: 600 TABLET, FILM COATED ORAL at 17:15

## 2021-01-01 RX ADMIN — FUROSEMIDE 40 MG: 10 INJECTION, SOLUTION INTRAMUSCULAR; INTRAVENOUS at 21:14

## 2021-01-01 RX ADMIN — LACTULOSE 30 ML: 20 SOLUTION ORAL at 05:13

## 2021-01-01 RX ADMIN — FENTANYL CITRATE 200 MCG/HR: 50 INJECTION, SOLUTION INTRAMUSCULAR; INTRAVENOUS at 11:00

## 2021-01-01 RX ADMIN — PROPOFOL 40 MCG/KG/MIN: 10 INJECTION, EMULSION INTRAVENOUS at 20:34

## 2021-01-01 RX ADMIN — MICAFUNGIN SODIUM 100 MG: 100 INJECTION, POWDER, LYOPHILIZED, FOR SOLUTION INTRAVENOUS at 16:30

## 2021-01-01 RX ADMIN — PROPOFOL 30 MCG/KG/MIN: 10 INJECTION, EMULSION INTRAVENOUS at 14:30

## 2021-01-01 RX ADMIN — ACETAZOLAMIDE 250 MG: 250 TABLET ORAL at 05:36

## 2021-01-01 RX ADMIN — LACTULOSE 30 ML: 20 SOLUTION ORAL at 12:02

## 2021-01-01 RX ADMIN — LIDOCAINE 1 PATCH: 50 PATCH TOPICAL at 17:42

## 2021-01-01 RX ADMIN — FENTANYL CITRATE 100 MCG: 50 INJECTION, SOLUTION INTRAMUSCULAR; INTRAVENOUS at 09:04

## 2021-01-01 RX ADMIN — VASOPRESSIN 0.03 UNITS/MIN: 20 INJECTION INTRAVENOUS at 17:16

## 2021-01-01 RX ADMIN — FUROSEMIDE 40 MG: 10 INJECTION, SOLUTION INTRAMUSCULAR; INTRAVENOUS at 21:15

## 2021-01-01 RX ADMIN — ENOXAPARIN SODIUM 40 MG: 40 INJECTION SUBCUTANEOUS at 05:35

## 2021-01-01 RX ADMIN — DEXMEDETOMIDINE 0.5 MCG/KG/HR: 200 INJECTION, SOLUTION INTRAVENOUS at 13:10

## 2021-01-01 RX ADMIN — LINEZOLID 600 MG: 600 TABLET, FILM COATED ORAL at 05:47

## 2021-01-01 RX ADMIN — POTASSIUM CHLORIDE 20 MEQ: 14.9 INJECTION, SOLUTION INTRAVENOUS at 07:13

## 2021-01-01 RX ADMIN — PREDNISOLONE 39.9 MG: 15 SOLUTION ORAL at 20:35

## 2021-01-01 RX ADMIN — FENTANYL CITRATE 100 MCG: 50 INJECTION, SOLUTION INTRAMUSCULAR; INTRAVENOUS at 15:50

## 2021-01-01 RX ADMIN — POTASSIUM BICARBONATE 50 MEQ: 978 TABLET, EFFERVESCENT ORAL at 12:24

## 2021-01-01 RX ADMIN — MIDODRINE HYDROCHLORIDE 10 MG: 5 TABLET ORAL at 11:24

## 2021-01-01 RX ADMIN — PROPOFOL 40 MCG/KG/MIN: 10 INJECTION, EMULSION INTRAVENOUS at 08:03

## 2021-01-01 RX ADMIN — FUROSEMIDE 80 MG: 10 INJECTION, SOLUTION INTRAMUSCULAR; INTRAVENOUS at 05:08

## 2021-01-01 RX ADMIN — OLANZAPINE 5 MG: 5 TABLET, FILM COATED ORAL at 17:07

## 2021-01-01 RX ADMIN — INSULIN HUMAN 1 UNITS: 100 INJECTION, SOLUTION PARENTERAL at 18:35

## 2021-01-01 RX ADMIN — POTASSIUM BICARBONATE 50 MEQ: 978 TABLET, EFFERVESCENT ORAL at 11:05

## 2021-01-01 RX ADMIN — FENTANYL CITRATE 2500 MCG: 50 INJECTION, SOLUTION INTRAMUSCULAR; INTRAVENOUS at 11:14

## 2021-01-01 RX ADMIN — POTASSIUM PHOSPHATE, MONOBASIC AND POTASSIUM PHOSPHATE, DIBASIC 15 MMOL: 224; 236 INJECTION, SOLUTION, CONCENTRATE INTRAVENOUS at 09:32

## 2021-01-01 RX ADMIN — PROPOFOL 45 MCG/KG/MIN: 10 INJECTION, EMULSION INTRAVENOUS at 05:45

## 2021-01-01 RX ADMIN — FENTANYL CITRATE 150 MCG/HR: 50 INJECTION, SOLUTION INTRAMUSCULAR; INTRAVENOUS at 11:12

## 2021-01-01 RX ADMIN — TRANEXAMIC ACID 300 MG: 100 INJECTION, SOLUTION INTRAVENOUS at 01:34

## 2021-01-01 RX ADMIN — PROPOFOL 45 MCG/KG/MIN: 10 INJECTION, EMULSION INTRAVENOUS at 08:34

## 2021-01-01 RX ADMIN — MIDODRINE HYDROCHLORIDE 10 MG: 5 TABLET ORAL at 18:26

## 2021-01-01 RX ADMIN — DEXTROSE MONOHYDRATE: 50 INJECTION, SOLUTION INTRAVENOUS at 15:53

## 2021-01-01 RX ADMIN — PROPOFOL 60 MCG/KG/MIN: 10 INJECTION, EMULSION INTRAVENOUS at 05:17

## 2021-01-01 RX ADMIN — NOREPINEPHRINE BITARTRATE 20 MCG/MIN: 1 INJECTION, SOLUTION, CONCENTRATE INTRAVENOUS at 03:35

## 2021-01-01 RX ADMIN — POTASSIUM BICARBONATE 50 MEQ: 978 TABLET, EFFERVESCENT ORAL at 17:43

## 2021-01-01 RX ADMIN — DOCUSATE SODIUM 50 MG AND SENNOSIDES 8.6 MG 2 TABLET: 8.6; 5 TABLET, FILM COATED ORAL at 05:18

## 2021-01-01 RX ADMIN — PROPOFOL 30 MCG/KG/MIN: 10 INJECTION, EMULSION INTRAVENOUS at 19:13

## 2021-01-01 RX ADMIN — FUROSEMIDE 80 MG: 10 INJECTION, SOLUTION INTRAMUSCULAR; INTRAVENOUS at 05:18

## 2021-01-01 RX ADMIN — FAMOTIDINE 20 MG: 20 TABLET ORAL at 17:11

## 2021-01-01 RX ADMIN — DOXYCYCLINE 100 MG: 100 TABLET, FILM COATED ORAL at 05:08

## 2021-01-01 RX ADMIN — POTASSIUM PHOSPHATE, MONOBASIC AND POTASSIUM PHOSPHATE, DIBASIC 30 MMOL: 224; 236 INJECTION, SOLUTION, CONCENTRATE INTRAVENOUS at 04:59

## 2021-01-01 RX ADMIN — DOXYCYCLINE 100 MG: 100 INJECTION, POWDER, LYOPHILIZED, FOR SOLUTION INTRAVENOUS at 17:31

## 2021-01-01 RX ADMIN — LINEZOLID 600 MG: 600 TABLET, FILM COATED ORAL at 17:12

## 2021-01-01 RX ADMIN — NOREPINEPHRINE BITARTRATE 3 MCG/MIN: 1 INJECTION, SOLUTION, CONCENTRATE INTRAVENOUS at 23:24

## 2021-01-01 RX ADMIN — NOREPINEPHRINE BITARTRATE 6 MCG/MIN: 1 INJECTION, SOLUTION, CONCENTRATE INTRAVENOUS at 04:54

## 2021-01-01 RX ADMIN — PHYTONADIONE 10 MG: 5 TABLET ORAL at 18:19

## 2021-01-01 RX ADMIN — DEXMEDETOMIDINE 0.3 MCG/KG/HR: 200 INJECTION, SOLUTION INTRAVENOUS at 10:27

## 2021-01-01 RX ADMIN — PROPOFOL 50 MCG/KG/MIN: 10 INJECTION, EMULSION INTRAVENOUS at 17:40

## 2021-01-01 RX ADMIN — PIPERACILLIN AND TAZOBACTAM 4.5 G: 4; .5 INJECTION, POWDER, LYOPHILIZED, FOR SOLUTION INTRAVENOUS; PARENTERAL at 13:32

## 2021-01-01 RX ADMIN — SODIUM CHLORIDE, POTASSIUM CHLORIDE, SODIUM LACTATE AND CALCIUM CHLORIDE 1000 ML: 600; 310; 30; 20 INJECTION, SOLUTION INTRAVENOUS at 10:25

## 2021-01-01 RX ADMIN — FENTANYL CITRATE 100 MCG: 50 INJECTION, SOLUTION INTRAMUSCULAR; INTRAVENOUS at 09:16

## 2021-01-01 RX ADMIN — PROPOFOL 45 MCG/KG/MIN: 10 INJECTION, EMULSION INTRAVENOUS at 02:03

## 2021-01-01 RX ADMIN — PROPOFOL 70 MCG/KG/MIN: 10 INJECTION, EMULSION INTRAVENOUS at 23:30

## 2021-01-01 RX ADMIN — DEXMEDETOMIDINE 1.5 MCG/KG/HR: 200 INJECTION, SOLUTION INTRAVENOUS at 05:20

## 2021-01-01 RX ADMIN — PROPOFOL 50 MCG/KG/MIN: 10 INJECTION, EMULSION INTRAVENOUS at 01:49

## 2021-01-01 RX ADMIN — NOREPINEPHRINE BITARTRATE 22 MCG/MIN: 1 INJECTION INTRAVENOUS at 14:17

## 2021-01-01 RX ADMIN — LORAZEPAM 1 MG: 2 INJECTION INTRAMUSCULAR; INTRAVENOUS at 14:16

## 2021-01-01 RX ADMIN — OLANZAPINE 5 MG: 5 TABLET, FILM COATED ORAL at 12:17

## 2021-01-01 RX ADMIN — PREDNISOLONE 39.9 MG: 15 SOLUTION ORAL at 20:03

## 2021-01-01 RX ADMIN — LORAZEPAM 2 MG: 2 INJECTION INTRAMUSCULAR; INTRAVENOUS at 02:32

## 2021-01-01 RX ADMIN — DEXMEDETOMIDINE 1.5 MCG/KG/HR: 200 INJECTION, SOLUTION INTRAVENOUS at 02:40

## 2021-01-01 RX ADMIN — FUROSEMIDE 80 MG: 10 INJECTION, SOLUTION INTRAMUSCULAR; INTRAVENOUS at 05:17

## 2021-01-01 RX ADMIN — PROPOFOL 60 MCG/KG/MIN: 10 INJECTION, EMULSION INTRAVENOUS at 00:58

## 2021-01-01 RX ADMIN — PHYTONADIONE 10 MG: 5 TABLET ORAL at 05:57

## 2021-01-01 RX ADMIN — AMOXICILLIN AND CLAVULANATE POTASSIUM 1 TABLET: 875; 125 TABLET, FILM COATED ORAL at 05:08

## 2021-01-01 RX ADMIN — FENTANYL CITRATE 2500 MCG: 50 INJECTION, SOLUTION INTRAMUSCULAR; INTRAVENOUS at 18:26

## 2021-01-01 RX ADMIN — MIDODRINE HYDROCHLORIDE 10 MG: 5 TABLET ORAL at 07:29

## 2021-01-01 RX ADMIN — PREDNISOLONE 39.9 MG: 15 SOLUTION ORAL at 20:08

## 2021-01-01 RX ADMIN — AMOXICILLIN AND CLAVULANATE POTASSIUM 1 TABLET: 875; 125 TABLET, FILM COATED ORAL at 19:37

## 2021-01-01 RX ADMIN — FENTANYL CITRATE 100 MCG: 50 INJECTION, SOLUTION INTRAMUSCULAR; INTRAVENOUS at 01:15

## 2021-01-01 RX ADMIN — PIPERACILLIN AND TAZOBACTAM 4.5 G: 4; .5 INJECTION, POWDER, LYOPHILIZED, FOR SOLUTION INTRAVENOUS; PARENTERAL at 05:46

## 2021-01-01 RX ADMIN — NOREPINEPHRINE BITARTRATE 10 MCG/MIN: 1 INJECTION, SOLUTION, CONCENTRATE INTRAVENOUS at 07:52

## 2021-01-01 RX ADMIN — PROPOFOL 70 MCG/KG/MIN: 10 INJECTION, EMULSION INTRAVENOUS at 05:45

## 2021-01-01 RX ADMIN — INSULIN HUMAN 1 UNITS: 100 INJECTION, SOLUTION PARENTERAL at 12:29

## 2021-01-01 RX ADMIN — ALTEPLASE 2 MG: 2.2 INJECTION, POWDER, LYOPHILIZED, FOR SOLUTION INTRAVENOUS at 23:07

## 2021-01-01 RX ADMIN — PIPERACILLIN AND TAZOBACTAM 4.5 G: 4; .5 INJECTION, POWDER, LYOPHILIZED, FOR SOLUTION INTRAVENOUS; PARENTERAL at 04:29

## 2021-01-01 ASSESSMENT — GAIT ASSESSMENTS: GAIT LEVEL OF ASSIST: UNABLE TO PARTICIPATE

## 2021-01-01 ASSESSMENT — PAIN DESCRIPTION - PAIN TYPE
TYPE: ACUTE PAIN

## 2021-01-01 ASSESSMENT — PATIENT HEALTH QUESTIONNAIRE - PHQ9
1. LITTLE INTEREST OR PLEASURE IN DOING THINGS: NOT AT ALL
2. FEELING DOWN, DEPRESSED, IRRITABLE, OR HOPELESS: NOT AT ALL
SUM OF ALL RESPONSES TO PHQ9 QUESTIONS 1 AND 2: 0
2. FEELING DOWN, DEPRESSED, IRRITABLE, OR HOPELESS: NOT AT ALL
1. LITTLE INTEREST OR PLEASURE IN DOING THINGS: NOT AT ALL
SUM OF ALL RESPONSES TO PHQ9 QUESTIONS 1 AND 2: 0

## 2021-01-01 ASSESSMENT — COGNITIVE AND FUNCTIONAL STATUS - GENERAL
STANDING UP FROM CHAIR USING ARMS: A LOT
DRESSING REGULAR UPPER BODY CLOTHING: A LOT
DAILY ACTIVITIY SCORE: 11
CLIMB 3 TO 5 STEPS WITH RAILING: A LOT
HELP NEEDED FOR BATHING: A LOT
SUGGESTED CMS G CODE MODIFIER MOBILITY: CL
DRESSING REGULAR LOWER BODY CLOTHING: A LOT
MOVING FROM LYING ON BACK TO SITTING ON SIDE OF FLAT BED: A LOT
EATING MEALS: TOTAL
TOILETING: TOTAL
MOVING TO AND FROM BED TO CHAIR: A LOT
TURNING FROM BACK TO SIDE WHILE IN FLAT BAD: A LOT
SUGGESTED CMS G CODE MODIFIER DAILY ACTIVITY: CL
PERSONAL GROOMING: A LITTLE
WALKING IN HOSPITAL ROOM: A LOT
MOBILITY SCORE: 12

## 2021-01-01 ASSESSMENT — PAIN SCALES - WONG BAKER
WONGBAKER_NUMERICALRESPONSE: DOESN'T HURT AT ALL
WONGBAKER_NUMERICALRESPONSE: HURTS JUST A LITTLE BIT

## 2021-01-01 ASSESSMENT — FIBROSIS 4 INDEX
FIB4 SCORE: 3.75
FIB4 SCORE: 3.75
FIB4 SCORE: 3.1
FIB4 SCORE: 2.02
FIB4 SCORE: 2.74
FIB4 SCORE: 3.31
FIB4 SCORE: 1.9
FIB4 SCORE: 2.74
FIB4 SCORE: 1.76
FIB4 SCORE: 2.17
FIB4 SCORE: 3.73

## 2021-01-01 ASSESSMENT — PULMONARY FUNCTION TESTS: FVC: 1

## 2021-01-01 ASSESSMENT — ACTIVITIES OF DAILY LIVING (ADL): TOILETING: INDEPENDENT

## 2021-04-17 PROBLEM — E87.1 HYPONATREMIA: Status: ACTIVE | Noted: 2021-01-01

## 2021-04-17 PROBLEM — A41.9 SEPTIC SHOCK (HCC): Status: ACTIVE | Noted: 2021-01-01

## 2021-04-17 PROBLEM — D72.829 LEUKOCYTOSIS: Status: ACTIVE | Noted: 2021-01-01

## 2021-04-17 PROBLEM — F10.939 ALCOHOL WITHDRAWAL (HCC): Status: ACTIVE | Noted: 2021-01-01

## 2021-04-17 PROBLEM — J96.01 ACUTE RESPIRATORY FAILURE WITH HYPOXIA (HCC): Status: ACTIVE | Noted: 2021-01-01

## 2021-04-17 PROBLEM — K70.10 ALCOHOLIC HEPATITIS: Status: ACTIVE | Noted: 2021-01-01

## 2021-04-17 PROBLEM — D72.829 LEUKOCYTOSIS: Status: RESOLVED | Noted: 2021-01-01 | Resolved: 2021-01-01

## 2021-04-17 PROBLEM — J69.0 ASPIRATION PNEUMONIA (HCC): Status: ACTIVE | Noted: 2021-01-01

## 2021-04-17 PROBLEM — F10.10 ALCOHOL ABUSE: Status: ACTIVE | Noted: 2021-01-01

## 2021-04-17 PROBLEM — R57.9 SHOCK (HCC): Status: ACTIVE | Noted: 2021-01-01

## 2021-04-17 PROBLEM — R17 ELEVATED BILIRUBIN: Status: ACTIVE | Noted: 2021-01-01

## 2021-04-17 PROBLEM — K85.90 PANCREATITIS: Status: ACTIVE | Noted: 2021-01-01

## 2021-04-17 PROBLEM — R65.21 SEPTIC SHOCK (HCC): Status: ACTIVE | Noted: 2021-01-01

## 2021-04-17 PROBLEM — E53.8 FOLATE DEFICIENCY: Status: ACTIVE | Noted: 2021-01-01

## 2021-04-17 NOTE — ASSESSMENT & PLAN NOTE
D/c prednisolone as the data supporting use in acute alcoholic hepatitis is not robust, and I feel the risks of steroids (delirium, weakness, infections) outweigh the benefits at this time

## 2021-04-17 NOTE — ASSESSMENT & PLAN NOTE
-WBC count was elevated at the outside hospital  -Most likely reactive no signs of active infection  -Monitor

## 2021-04-17 NOTE — ASSESSMENT & PLAN NOTE
This is Septic shock Present on admission  SIRS criteria identified on my evaluation include: Hypothermia, with temperature less than 96.8 deg F, Tachypnea, with respirations greater than 20 per minute and Leukocytosis, with WBC greater than 12,000  Source is respiratory   Suspected onset of infection (date and time) today   Presentation includes: Severe sepsis present and persistent hypotension after 30 ml/kg completed.   Despite appropriate fluid resuscitation with crystalloid given per sepsis guidelines, the patient remains hypotensive with systolic blood pressure less than 90 or MAP less than 65  Hemodynamic support with additional fluids and IV vasopressors as needed to maintain a SBP of 90 or MAP of 65  IV antibiotics as appropriate for source of sepsis  Reassessment: I have reassessed the patient's hemodynamic status    Start IV unasyn   Cultures pending   Lactic acid pending   Continue levophed per ICU

## 2021-04-17 NOTE — PROGRESS NOTES
Patient arrived as DA from [facility] at 0910 via EMS, intubated and ventilated. On Levo 10 and Ketamine gtt. Pt is obtundent. VSS.   Sotomayor replaced to a new facility one. Dark bhargavi  ml.   0940: Time out for CVC per Dr Gonda.   Steve pearl.

## 2021-04-17 NOTE — ASSESSMENT & PLAN NOTE
Likely 2/2 alcohol related liver injury   CT abdomen and pelvis showed fatty changes of the liver   Trend

## 2021-04-17 NOTE — ASSESSMENT & PLAN NOTE
Questionable ?lipase at the outside hospital was 800, however CT abdomen showed no signs of acute pancreatitis   Possibly triggered by alcohol   Repeat lipase here was 412  Monitor

## 2021-04-17 NOTE — CARE PLAN
Problem: Skin Integrity  Goal: Risk for impaired skin integrity will decrease  Note: Skin is assessed for integrity throughout the shift. Pt is turned at least every 2 hours. Pt is kept dry and clean.       Problem: Safety - Medical Restraint  Goal: Remains free of injury from restraints (Restraint for Interference with Medical Device)  Description: INTERVENTIONS:  1. Determine that other, less restrictive measures have been tried or would not be effective before applying the restraint  2. Evaluate the patient's condition at the time of restraint application  3. Inform patient/family regarding the reason for restraint  4. Q2H: Monitor safety, psychosocial status, comfort, nutrition and hydration  Flowsheets (Taken 4/17/2021 1335)  Addressed this shift: Remains free of injury from restraints (restraint for interference with medical device):   Determine that other, less restrictive measures have been tried or would not be effective before applying the restraint   Evaluate the patient's condition at the time of restraint application   Inform patient/family regarding the reason for restraint   Every 2 hours: Monitor safety, psychosocial status, comfort, nutrition and hydration  Note: Patient and family educated on the restraints. .

## 2021-04-17 NOTE — PROGRESS NOTES
Med rec complete per phone call with pt brother. Brother denies that pt takes any medications. Allergies reviewed. No abx in the past 14 days

## 2021-04-17 NOTE — ASSESSMENT & PLAN NOTE
Possible, lipase 800 at prior hospital  CT abdomen showed no signs of acute pancreatitis  Possibly related to alcohol abuse   Repeat lipase 412

## 2021-04-17 NOTE — ASSESSMENT & PLAN NOTE
Since the age of 16 years as per the documentation  Pt is actively drinking. 12packs of beer daily and a pint of hard liquor daily.    Last drink was before admission

## 2021-04-17 NOTE — PROGRESS NOTES
[facility] TRIAGE OFFICER DIRECT ADMISSION REPORT  Transferring facility: [facility]  Transferring physician: Dr Jsaon Trent    Chief complaint: EtOH withdrawal  Pertinent history & patient course: Patient with increasing CIWA scores and decompensating, with tachycardia and acute respiratory failure requiring 15L Non rebreather mask,  facility unable to manage, admitted with EtOH withdrawal and pancreatitis on 04/15/21.    Pertinent imaging & lab results: Na 118---> 124, CIWA score 25, requiring 15 L Nonrebreather mask, bili> 11, Lipase 800, -115  Code Status: Full per transferring provider, I personally verified with the transferring provider patient's code status and the transferring provider has confirmed this with the patient.    Consultants called prior to transfer and pertinent input from consultants: Patient does not meet ICU criteria on admission, will be managed in Telemetry.   Patient accepted for transfer: Yes  Consultants to be called upon arrival: None  Admission status: Inpatient.  Floor requested: Telemetry    Addendum @ 0640:  Call back from Dr. Trent - patient decompensated as EMS arrived and required intubation due to likely aspiration.  Will admit to ICU.    Please inform the triage officer upon arrival of the patient to [facility] for assignment of a hospitalist to perform admission.     For any question or concerns regarding the care of this patient, please reach out to the assigned hospitalist.

## 2021-04-17 NOTE — ASSESSMENT & PLAN NOTE
Per EMS report pt aspirated and was intubated during transport   CXR showed mild perihilar and lower lobe opacifications could be due to pulmonary edema versus inflammation or infection.  procal elevated   Started unasyn

## 2021-04-17 NOTE — PROCEDURES
Central Line Placement Procedure  Indication: central venous monitoring and centrally administered medications for severe alcohol withdrawal requiring vasopressor support     Consent: Unable to be obtained due to patient's condition.    Procedure: The patient was positioned appropriately and the skin over the right internal jugular vein was prepped with betadine and draped in a sterile fashion. Local anesthesia was obtained by infiltration using 1% Lidocaine without epinephrine.  A large bore needle was used to identify the vein.  A guide wire was then inserted into the vein through the needle. A triple lumen catheter was then inserted into the vessel over the guide wire using the Seldinger technique.  All ports showed good, free flowing blood return and were flushed with saline solution.  The catheter was then securely fastened to the skin with sutures and covered with a sterile dressing.  A post procedure X-ray was ordered and is still pending at this time.    The patient tolerated the procedure well.    Complications: None

## 2021-04-17 NOTE — DIETARY
"Nutrition Support Assessment:  Day 0 of admit.  Irving Ivory is a 40 y.o. male with admitting DX of ETOH withdrawal.      Current problem list:  1. Alcohol withdrawal  2. Acute respiratory failure with hypoxia  3. Shock  4. Pancreatitis  5. Aspiration pneumonia  6. Hyponatremia  7. Alcohol abuse  8. Leukocytosis     Assessment:  Estimated Nutritional Needs based on:   Height: 177.8 cm (5' 10\")  Weight: (!) 134 kg (295 lb 6.7 oz)  Weight to Use in Calculations: 134 kg (295 lb 6.7 oz) - admit bed scale weight. Pt noted with pitting edema and abdominal swelling. Likely not dry weight. Will monitor trend.   Ideal Body Weight: 75.5 kg (166 lb 7.2 oz)  Percent Ideal Body Weight: 177.5  Body mass index is 42.39 kg/m²., BMI classification: Class III Obesity.     Calculation/Equation: MSJ x 1 = 2248 kcal/d; RMR per [facility] (Tmax 24/hr: 36.4; Vent: 10.6 L/min) = 2352 (65-70% per ASPEN obesity guidelines) = 1528 - 1646 kcals/d.   Total Calories / day: 1474 - 1876 (Calories / k - 14)  Total Grams Protein / day: 113 - 151 (Grams Protein / k.5 - 2 IBW)     Evaluation:   1. Transferred via EMS from [facility]. Admitted at [facility] since 4/15 for alcohol withdrawal and possible pancreatitis. During transfer to [facility] for higher level of care via EMS, pt aspirated and required intubation.   2. Gastric cortrak placed and confirmed.   3. Labs: Na 125, K 4.3, Glucose 149, BUN 7, creatinine 0.35, lipase 412.   4. Meds: pepcid, folic acid, lasix, ICU electrolyte replacement prn, thiamine, bowel regimen; Propofol @ 30 mcg/kg (24.1 ml/hr = 636 kcal), Levophed @ 12 mcg/min.   5. Per MD note - pt with 2+ lower extremity edema and abdominal swelling however ultrasound did not reveal large amount of ascites to drain.   6. GI: pta  7. Given ETOH abuse and unkown PO pta. Pt at risk for refeeding, RD to monitor.   8. A high-protein, fiber-free formula is indicated to meet pt's needs.      Malnutrition Risk: unable to " fully assess at this time.      Recommendations/Plan:  1. Start Peptamen Intense VHP @ 25 ml/hr, advance per protocol to 50 ml/hr (goal on propofol) to provide 1200 kcal (+ kcal from propofol = 14 kcal/kg), 110 gm protein (~1.5 gm/kg IBW) and 1008 ml free water.   2. Goal off propofol: Peptamen Intense VHP @ 65 ml/hr to provide 1560 kcal (12 kcal/kg), 143 gm protein (1.9 gm/kg IBW) and 1310 ml free water.    3. Fluids per MD.   4. Monitor for refeeding: Order BMP w/ Mg and Phos x 7 days. Replete K, Phos and Mg prn. Supplement 100 mg Thiamine x 7 days to reduce risk of refeeding  5. Monitor weight.     RD Following.

## 2021-04-17 NOTE — ASSESSMENT & PLAN NOTE
admitted at [facility] on 04/15 for alcohol withdrawal, transferred to [facility] for higher level of care  Intubated for aspiration   Continue fentanyl and propofol   Thiamine, folate, and MVI supplementation

## 2021-04-17 NOTE — CARE PLAN
Problem: Nutritional:  Goal: Nutrition support tolerated and meeting greater than 85% of estimated needs  Outcome: NOT MET     See RD note.

## 2021-04-17 NOTE — CONSULTS
Critical Care Consultation    Date of consult: 4/17/2021    Referring Physician  Evin Hammer M.D.    Reason for Consultation  Alcohol withdrawal with aspiration requiring intubation and vasopressor support    History of Presenting Illness  40 y.o. male with no significant medical history other than alcohol abuse is transferred from [facility].  He was admitted on 4/15 for alcohol withdrawal and possible pancreatitis.  His CIWA scores have been around 25-27.  His lipase was 800 CT abdomen and pelvis showed fatty changes of the liver without any acute pancreatic changes and small amount of ascites in the pelvic fluid.  Patient was being transferred to [facility] for higher level of care and in route he developed aspiration and required emergent intubation.  Patient received 60 mg of Lasix and Ativan at the outside hospital.  Other labs at the outside hospital include sodium of 117-->121, K of 3.4 and normal creatinine function.   On admission here, patient was already intubated, he has 2+ pitting edema in his lower extremities and abdominal swelling.  However bedside ultrasound did not show a large amount of ascites to drain.  Central line was placed at the bedside for vasopressors and other central delivered medications.       Code Status  Full code     Review of Systems  Review of Systems   Unable to perform ROS: Intubated       Past Medical History   has no past medical history on file.    Surgical History   has no past surgical history on file.    Family History  family history is not on file.    Social History       Medications  Home Medications    **Home medications have not yet been reviewed for this encounter**       Current Facility-Administered Medications   Medication Dose Route Frequency Provider Last Rate Last Admin   • Respiratory Therapy Consult   Nebulization Continuous RT Jeremy M Gonda, M.D.       • famotidine (PEPCID) tablet 20 mg  20 mg Enteral Tube Q12HRS Jeremy M Gonda, M.D.        Or    • famotidine (PEPCID) injection 20 mg  20 mg Intravenous Q12HRS Jeremy M Gonda, M.D.       • senna-docusate (PERICOLACE or SENOKOT S) 8.6-50 MG per tablet 2 tablet  2 tablet Enteral Tube BID Jeremy M Gonda, M.D.        And   • polyethylene glycol/lytes (MIRALAX) PACKET 1 Packet  1 Packet Enteral Tube QDAY PRN Jeremy M Gonda, M.D.        And   • magnesium hydroxide (MILK OF MAGNESIA) suspension 30 mL  30 mL Enteral Tube QDAY PRN Jeremy M Gonda, M.D.        And   • bisacodyl (DULCOLAX) suppository 10 mg  10 mg Rectal QDAY PRN Jeremy M Gonda, M.D.       • MD Alert...ICU Electrolyte Replacement per Pharmacy   Other PHARMACY TO DOSE Jeremy M Gonda, M.D.       • lidocaine (XYLOCAINE) 1 % injection 1-2 mL  1-2 mL Tracheal Tube Q30 MIN PRN Jeremy M Gonda, M.D.       • Pharmacy Consult: Enteral tube insertion - review meds/change route/product selection  1 Each Other PHARMACY TO DOSE Jeremy M Gonda, M.D.       • fentaNYL (SUBLIMAZE) injection 100 mcg  100 mcg Intravenous Q15 MIN PRN Jeremy M Gonda, M.D.        And   • fentaNYL (SUBLIMAZE) injection 200 mcg  200 mcg Intravenous Q15 MIN PRN Jeremy M Gonda, M.D.        And   • fentaNYL (SUBLIMAZE) 50 mcg/mL in 50mL (Continuous Infusion)   Intravenous Continuous Jeremy M Gonda, M.D. 2 mL/hr at 04/17/21 0934 100 mcg/hr at 04/17/21 0934    And   • propofol (DIPRIVAN) injection  0-80 mcg/kg/min Intravenous Continuous Jeremy M Gonda, M.D. 24.1 mL/hr at 04/17/21 0930 30 mcg/kg/min at 04/17/21 0930   • norepinephrine (Levophed) infusion 8 mg/250 mL (premix)  0-30 mcg/min Intravenous Continuous Jeremy M Gonda, M.D. 18.8 mL/hr at 04/17/21 0900 10 mcg/min at 04/17/21 0900       Allergies  Not on File    Vital Signs last 24 hours  Temp:  [35.6 °C (96.1 °F)] 35.6 °C (96.1 °F)  Pulse:  [78-87] 80  Resp:  [28-29] 28  BP: (88-99)/(57-61) 97/61  SpO2:  [91 %-100 %] 95 %    Physical Exam  Physical Exam  Constitutional:       Comments: Intubated   HENT:      Head: Normocephalic.      Nose:  Nose normal.      Mouth/Throat:      Mouth: Mucous membranes are moist.   Eyes:      Extraocular Movements: Extraocular movements intact.      Pupils: Pupils are equal, round, and reactive to light.   Cardiovascular:      Rate and Rhythm: Normal rate and regular rhythm.      Heart sounds: Normal heart sounds.   Pulmonary:      Comments: On ventilator  Abdominal:      General: There is distension.      Palpations: Abdomen is soft.   Musculoskeletal:         General: Swelling present. No deformity.      Comments: 2+ pitting edema in bilateral lower extremities   Neurological:      Comments: Unable to assess as he is intubated         Fluids    Intake/Output Summary (Last 24 hours) at 4/17/2021 1011  Last data filed at 4/17/2021 0930  Gross per 24 hour   Intake --   Output 200 ml   Net -200 ml       Laboratory  No results found for this or any previous visit (from the past 48 hour(s)).    Imaging  DX-CHEST-PORTABLE (1 VIEW)    (Results Pending)   DX-ABDOMEN FOR TUBE PLACEMENT    (Results Pending)       Assessment/Plan  Alcohol withdrawal (HCC)  Assessment & Plan  -Patient has history of alcohol abuse since the age of 16 as per the documentation  -Was admitted at [facility] on 04/15 for alcohol withdrawal, transferred to [facility] for higher level of care  -Was intubated in route secondary to aspiration  -On fentanyl and propofol for withdrawal no need for Precedex  -Replete electrolytes as needed, monitor sodium   -RAAS monitoring  -Intubated, ventilator settings ordered   -RT protocol  -Levophed for BP support  Core track placed for medications and enteral feeding  -Thiamine folate multivitamin      Alcoholic hepatitis  Assessment & Plan  -AST/ALT is 119/18 and T.Bili of 16.5  -Most likely from alcohol use, however we need to rule out obstructive picture  -USG ordered to look for obstruction. Might need Mrcp   -Charla's DS is 39, will start steroids  MELD score is 29, indicating 19.6% 3 month  mortality    Hyponatremia  Assessment & Plan  -Sodium level was 117-- 121 at the outside hospital  -Possibly secondary to liver changes and fluid overload  -Repeat labs and monitor    Aspiration pneumonitis (HCC)  Assessment & Plan  Unclear, as per the EMS person, patient aspirated and was emergently intubated  Pro calcitonin elevated at 1.49, chest x ray is normal   -Unclear if it is pneumonia or pneumonitis, will start Unasyn for now given elevated C-rp and Pro calcitonin    Pancreatitis  Assessment & Plan  -Questionable ?lipase at the outside hospital was 800, however CT abdomen showed no signs of acute pancreatitis  -Possibly triggered by alcohol withdrawal  -Repeat lipase ordered here    Alcohol abuse  Assessment & Plan  -Since the age of 16 years as per the documentation  -Unclear about the amount of consumption daily  -Needs alcohol cessation on discharge      Discussed patient condition and risk of morbidity and/or mortality with Hospitalist and RN.    The patient remains critically ill.  Critical care time = 40 minutes in directly providing and coordinating critical care and extensive data review.  No time overlap and excludes procedures.

## 2021-04-17 NOTE — ASSESSMENT & PLAN NOTE
Unclear etiology, possibly hypotension from propofol   Currently on levophed   Management per ICU

## 2021-04-17 NOTE — H&P
Hospital Medicine History & Physical Note    Date of Service  4/17/2021    Primary Care Physician  No primary care provider on file.    Consultants  Critical care    Code Status  Full Code    Chief Complaint  Alcohol Withdrawal    History of Presenting Illness  40 y.o. male who presented 4/17/2021 as a direct transfer from [facility] for alcohol withdrawal and possible pancreatitis.  Patient was intubated and sedated at time of my evaluation, per chart review patient was admitted 4/15 for alcohol withdrawal and possible pancreatitis.  Patient CIWA scores were 25-27 and lipase was 800.  CT abdomen pelvis was done that showed fatty changes of the liver without any acute pancreatic changes and a small amount of ascites.  Patient was transferred to [facility] for higher level care.  During transfer patient aspirated and required emergent intubation.  Patient admitted to critical care service.    Review of Systems  Review of Systems   Unable to perform ROS: Intubated       Past Medical History   has a past medical history of Alcohol abuse.    Surgical History  Unable to obtain surgical history as patient is intubated    Family History  Unable to obtain family history as patient is intubated    Social History   current alcohol use. Unknown tobacco or drug use.     Allergies  No Known Allergies    Medications  None       Physical Exam  Temp:  [35.6 °C (96.1 °F)] 35.6 °C (96.1 °F)  Pulse:  [57-87] 87  Resp:  [22-29] 24  BP: (86-99)/(55-62) 95/55  SpO2:  [91 %-100 %] 98 %    Physical Exam  Vitals and nursing note reviewed.   Constitutional:       General: He is not in acute distress.     Appearance: He is morbidly obese. He is ill-appearing.      Interventions: He is sedated, intubated and restrained.   HENT:      Head: Normocephalic and atraumatic.      Right Ear: External ear normal.      Left Ear: External ear normal.      Nose: Nose normal.      Mouth/Throat:      Comments: ET tube in place  Eyes:      General:  Scleral icterus present.      Pupils: Pupils are equal, round, and reactive to light.   Cardiovascular:      Rate and Rhythm: Normal rate and regular rhythm.      Pulses: Normal pulses.   Pulmonary:      Effort: No respiratory distress. He is intubated.      Breath sounds: Rhonchi present. No wheezing.      Comments: On ventilator  Abdominal:      General: There is distension.   Genitourinary:     Comments: Sotomayor in place  Musculoskeletal:      Cervical back: Neck supple.      Right lower leg: Edema present.      Left lower leg: Edema present.   Skin:     General: Skin is warm and dry.      Coloration: Skin is jaundiced.      Findings: No rash.   Neurological:      Comments: Neuro exam   Psychiatric:         Mood and Affect: Mood normal.         Behavior: Behavior normal.         Laboratory:  Recent Labs     04/17/21  0953   WBC 20.5*   RBC 2.43*   HEMOGLOBIN 8.2*   HEMATOCRIT 24.4*   .4*   MCH 33.7*   MCHC 33.6*   RDW 58.8*   PLATELETCT 309   MPV 10.1     Recent Labs     04/17/21  0953   SODIUM 125*   POTASSIUM 4.3   CHLORIDE 85*   CO2 32   GLUCOSE 149*   BUN 7*   CREATININE 0.35*   CALCIUM 8.0*     Recent Labs     04/17/21  0953   ALTSGPT 18   ASTSGOT 119*   ALKPHOSPHAT 217*   TBILIRUBIN 16.5*   LIPASE 412*   PREALBUMIN 8.6*   GLUCOSE 149*     Recent Labs     04/17/21  1109   INR 1.57*     No results for input(s): NTPROBNP in the last 72 hours.      No results for input(s): TROPONINT in the last 72 hours.    Imaging:  DX-ABDOMEN FOR TUBE PLACEMENT   Final Result      Feeding tube extends below the diaphragm with tip at the level of the gastric antrum.      DX-CHEST-PORTABLE (1 VIEW)   Final Result         1.  Mild cardiomegaly.      2.  Mild perihilar and lower lobe opacifications could be due to pulmonary edema versus inflammation or infection.      3.  No consolidations identified.            Assessment/Plan:  I anticipate this patient will require at least two midnights for appropriate medical  management, necessitating inpatient admission.    Septic shock (HCC)- (present on admission)  Assessment & Plan  This is Septic shock Present on admission  SIRS criteria identified on my evaluation include: Hypothermia, with temperature less than 96.8 deg F, Tachypnea, with respirations greater than 20 per minute and Leukocytosis, with WBC greater than 12,000  Source is respiratory   Suspected onset of infection (date and time) today   Presentation includes: Severe sepsis present and persistent hypotension after 30 ml/kg completed.   Despite appropriate fluid resuscitation with crystalloid given per sepsis guidelines, the patient remains hypotensive with systolic blood pressure less than 90 or MAP less than 65  Hemodynamic support with additional fluids and IV vasopressors as needed to maintain a SBP of 90 or MAP of 65  IV antibiotics as appropriate for source of sepsis  Reassessment: I have reassessed the patient's hemodynamic status    Start IV unasyn   Cultures pending   Lactic acid pending   Continue levophed per ICU     Acute respiratory failure with hypoxia (HCC)- (present on admission)  Assessment & Plan  Intubated after aspiration by EMS   Ventilator management per ICU   RT consulted     Alcohol withdrawal (HCC)  Assessment & Plan  admitted at [facility] on 04/15 for alcohol withdrawal, transferred to [facility] for higher level of care  Intubated for aspiration   Continue fentanyl and propofol   Thiamine, folate, and MVI supplementation     Hyponatremia  Assessment & Plan  Na 117-121 per outside records   Likely 2/2 alcohol induced liver injury   monitor    Aspiration pneumonia (HCC)  Assessment & Plan  Per EMS report pt aspirated and was intubated during transport   CXR showed mild perihilar and lower lobe opacifications could be due to pulmonary edema versus inflammation or infection.  procal elevated   Started unasyn     Pancreatitis  Assessment & Plan  Possible, lipase 800 at prior hospital  CT  abdomen showed no signs of acute pancreatitis  Possibly related to alcohol abuse   Repeat lipase 412    Elevated bilirubin- (present on admission)  Assessment & Plan  Likely 2/2 alcohol related liver injury   CT abdomen and pelvis showed fatty changes of the liver   Trend     Folate deficiency- (present on admission)  Assessment & Plan  Folate <2   IV and oral replacement ordered     Alcohol abuse  Assessment & Plan  Per records, heavy drinking since 17 yo   alcohol cessation counseling prior to dc    DVT prophylaxis: Lovenox

## 2021-04-17 NOTE — PROGRESS NOTES
Cortrak Placement    Tube Team verified patient name and medical record number prior to tube placement.  Cortrak tube (43 inches, 10 Mosotho) placed at 74 cm in left nare.  Per Cortrak picture, tube appears to be in the small bowel. VSS tolerated well.  Nursing Instructions: Awaiting KUB to confirm placement before use for medications or feeding. Once placement confirmed, flush tube with 30 ml of water, and then remove and save stylet, in patient medication drawer.

## 2021-04-18 PROBLEM — E83.39 HYPOPHOSPHATEMIA: Status: ACTIVE | Noted: 2021-01-01

## 2021-04-18 NOTE — PROGRESS NOTES
RCC    GB U/s reviewed with Dr Monk in radiology, GB wall thickened but no stone, CBD 6 mm, fatty liver with ascites noted.  Alk-phos/Bili up from underlying ALD, repeat CMP in AM, last lactic acid 2.1 slt up, NE dose slt better, remains on full vent support sedated.  If norepinephrine dose increases consider adding vasopressin.  Day team felt pressor requirements were primarily related to sedation use i.e. propofol, wean propofol if able but avoid severe agitation/significant ventilator dyssynchrony.

## 2021-04-18 NOTE — CARE PLAN
Problem: Safety  Goal: Will remain free from injury  Outcome: PROGRESSING AS EXPECTED    Fall precautions in place. Bed in lowest position. Non-skid socks in place. Personal possessions within reach. Mobility sign on door. Bed-alarm on.      Problem: Safety - Medical Restraint  Goal: Remains free of injury from restraints (Restraint for Interference with Medical Device)  Description: INTERVENTIONS:  1. Determine that other, less restrictive measures have been tried or would not be effective before applying the restraint  2. Evaluate the patient's condition at the time of restraint application  3. Inform patient/family regarding the reason for restraint  4. Q2H: Monitor safety, psychosocial status, comfort, nutrition and hydration  Outcome: PROGRESSING AS EXPECTED

## 2021-04-18 NOTE — PROGRESS NOTES
"Critical Care Progress Note    Date of admission  4/17/2021    Chief Complaint  40 y.o. male admitted 4/17/2021 with respiratory failure, encephalopathy    Hospital Course  \"Patient is an unfortunate 40-year-old male who was transferred from [facility] where he was admitted several days ago for pancreatitis and alcohol abuse subsequently going into alcohol withdrawal syndrome.  He required multiple doses of Ativan and had a CIWA score of 25 becoming too much for the outside hospital to handle.  Transport was arranged at approximately 530 this morning but upon their arrival, patient was having increased work of breathing and altered mentation and the decision was made to intubate the patient after Narcan showed no effect.  There was no report of aspiration per the EMS crew.  After intubation, patient became hypotensive requiring initiation of a norepinephrine drip.  He had also been given 60 mg of IV Lasix by the physician.  His initial end-tidal CO2 after intubation was 100 but was improving to 50 after mechanical ventilatory support.  He was transferred on a vent, Levophed drip, ketamine and fentanyl drips as well.  On review of outside hospital records, patient's last sodium was 121 with a K of 3.1, his lipase was 800 and a CT scan did not show evidence of any complications of pancreatitis.\"      Interval Problem Update  Reviewed last 24 hour events:   - Moving purposefully overnight, sedation increased and now obtunded   - Neuro: RASS -3 to -4   - HR: 80s-100s   - SBP: 100s-110s   - GI: Start tube feeds   - UOP: 4.8L/24 hrs   - Sotomayor: yes   - Tm: 38   - Lines: CVC   - PPx: GI pepcid, DVT lovenox   - ABG: not done - ordered   - CXR (personally reviewed and compared to prior): stable edema   - Mobility level 1, eligible for progression yes   - Kphos 45 mmol      Review of Systems  Review of Systems   Unable to perform ROS: Intubated        Vital Signs for last 24 hours   Temp:  [35.6 °C (96.1 " °F)-37.3 °C (99.1 °F)] 37.3 °C (99.1 °F)  Pulse:  [] 96  Resp:  [22-29] 26  BP: ()/(48-68) 115/62  SpO2:  [91 %-100 %] 95 %    Hemodynamic parameters for last 24 hours  CVP:  [0 MM HG-300 MM HG] 17 MM HG    Respiratory Information for the last 24 hours  Vent Mode: APVCMV  Rate (breaths/min): 26  Vt Target (mL): 440  PEEP/CPAP: 10  MAP: 17  Control VTE (exp VT): 437    Physical Exam   Physical Exam  Vitals and nursing note reviewed.   Constitutional:       General: He is in acute distress.      Appearance: He is well-developed. He is ill-appearing.      Interventions: He is intubated.   HENT:      Head: Normocephalic and atraumatic.      Right Ear: External ear normal.      Left Ear: External ear normal.      Nose:      Comments: Cortrak in place     Mouth/Throat:      Mouth: Mucous membranes are dry.      Pharynx: Oropharynx is clear.      Comments: ET tube in position  Eyes:      Conjunctiva/sclera: Conjunctivae normal.      Pupils: Pupils are equal, round, and reactive to light.   Neck:      Vascular: No JVD.      Trachea: No tracheal deviation.   Cardiovascular:      Rate and Rhythm: Normal rate and regular rhythm.      Pulses: Normal pulses.   Pulmonary:      Effort: He is intubated.      Breath sounds: Rales present. No wheezing.   Abdominal:      General: Bowel sounds are normal. There is distension.      Palpations: Abdomen is soft.      Tenderness: There is abdominal tenderness.   Musculoskeletal:         General: No tenderness.      Cervical back: Neck supple.   Skin:     General: Skin is warm and dry.      Capillary Refill: Capillary refill takes less than 2 seconds.      Findings: No rash.   Neurological:      General: No focal deficit present.      Mental Status: He is alert.      Cranial Nerves: No cranial nerve deficit.      Sensory: No sensory deficit.      Motor: No weakness.      Comments: Opens eyes to deep painful stimuli.  Localizing to pain         Medications  Current  Facility-Administered Medications   Medication Dose Route Frequency Provider Last Rate Last Admin   • potassium phosphate IVPB 30 mmol in 500 mL D5W (premix)  30 mmol Intravenous Once Jeremy M Gonda, M.D. 125 mL/hr at 04/18/21 0459 30 mmol at 04/18/21 0459    Followed by   • potassium phosphate 15 mmol in dextrose 5% 500 mL ivpb  15 mmol Intravenous Once Jeremy M Gonda, M.D.       • Respiratory Therapy Consult   Nebulization Continuous RT Jeremy M Gonda, M.D.       • famotidine (PEPCID) tablet 20 mg  20 mg Enteral Tube Q12HRS Jeremy M Gonda, M.D.   20 mg at 04/18/21 0521    Or   • famotidine (PEPCID) injection 20 mg  20 mg Intravenous Q12HRS Jeremy M Gonda, M.D.   20 mg at 04/17/21 1712   • senna-docusate (PERICOLACE or SENOKOT S) 8.6-50 MG per tablet 2 tablet  2 tablet Enteral Tube BID Jeremy M Gonda, M.D.   2 tablet at 04/18/21 0521    And   • polyethylene glycol/lytes (MIRALAX) PACKET 1 Packet  1 Packet Enteral Tube QDAY PRN Jeremy M Gonda, M.D.        And   • magnesium hydroxide (MILK OF MAGNESIA) suspension 30 mL  30 mL Enteral Tube QDAY PRN Jeremy M Gonda, M.D.        And   • bisacodyl (DULCOLAX) suppository 10 mg  10 mg Rectal QDAY PRN Jeremy M Gonda, M.D.       • MD Alert...ICU Electrolyte Replacement per Pharmacy   Other PHARMACY TO DOSE Jeremy M Gonda, M.D.       • lidocaine (XYLOCAINE) 1 % injection 1-2 mL  1-2 mL Tracheal Tube Q30 MIN PRN Jeremy M Gonda, M.D.       • Pharmacy Consult: Enteral tube insertion - review meds/change route/product selection  1 Each Other PHARMACY TO DOSE Jeremy M Gonda, M.D.       • fentaNYL (SUBLIMAZE) injection 100 mcg  100 mcg Intravenous Q15 MIN PRN Jeremy M Gonda, M.D.   100 mcg at 04/18/21 0054    And   • fentaNYL (SUBLIMAZE) injection 200 mcg  200 mcg Intravenous Q15 MIN PRN Jeremy M Gonda, M.D.        And   • fentaNYL (SUBLIMAZE) 50 mcg/mL in 50mL (Continuous Infusion)   Intravenous Continuous Jeremy M Gonda, M.D. 4 mL/hr at 04/18/21 0657 200 mcg/hr at 04/18/21 0657     And   • propofol (DIPRIVAN) injection  0-80 mcg/kg/min Intravenous Continuous Jeremy M Gonda, M.D. 40.2 mL/hr at 04/18/21 0714 50 mcg/kg/min at 04/18/21 0714   • norepinephrine (Levophed) infusion 8 mg/250 mL (premix)  0-30 mcg/min Intravenous Continuous Jeremy M Gonda, M.D. 22.5 mL/hr at 04/18/21 0002 12 mcg/min at 04/18/21 0002   • thiamine (Vitamin B-1) tablet 100 mg  100 mg Enteral Tube DAILY Radha Beckett M.D.   100 mg at 04/18/21 0521   • folic acid (FOLVITE) tablet 1 mg  1 mg Enteral Tube DAILY Radha Beckett M.D.   1 mg at 04/18/21 0521   • prednisoLONE (PRELONE) 15 MG/5ML syrup 39.9 mg  39.9 mg Enteral Tube DAILY Jeremy M Gonda, M.D.   39.9 mg at 04/17/21 2035   • enoxaparin (LOVENOX) inj 40 mg  40 mg Subcutaneous Q12HRS Jeremy M Gonda, M.D.   Stopped at 04/18/21 0600   • ampicillin/sulbactam (UNASYN) 3 g in  mL IVPB  3 g Intravenous Q6HRS Katie Eduardo D.ANA   Stopped at 04/18/21 0553   • furosemide (LASIX) injection 80 mg  80 mg Intravenous Q12HRS Jeremy M Gonda, M.D.   80 mg at 04/18/21 0523   • vasopressin (VASOSTRICT) 20 Units in  mL Infusion  0.03 Units/min Intravenous Continuous Juan Jose Dutta M.D.   Stopped at 04/17/21 2319       Fluids    Intake/Output Summary (Last 24 hours) at 4/18/2021 0720  Last data filed at 4/18/2021 0600  Gross per 24 hour   Intake 1992.43 ml   Output 4750 ml   Net -2757.57 ml       Laboratory  Recent Labs     04/17/21  1127   ISTATAPH 7.412   ISTATAPCO2 52.6*   ISTATAPO2 75   ISTATATCO2 35*   [id number] 95   ISTATARTHCO3 33.5*   ISTATARTBE 8*   ISTATTEMP 36.6 C   ISTATFIO2 55   ISTATSPEC Arterial   ISTATAPHTC 7.418   [id number] 73         Recent Labs     04/17/21  0953 04/18/21  0230   SODIUM 125* 130*   POTASSIUM 4.3 3.4*   CHLORIDE 85* 89*   CO2 32 29   BUN 7* 11   CREATININE 0.35* 0.56   MAGNESIUM  --  2.3   PHOSPHORUS  --  0.8*   CALCIUM 8.0* 8.2*     Recent Labs     04/17/21  0953 04/18/21  0230   ALTSGPT 18 19   ASTSGOT 119* 121*    ALKPHOSPHAT 217* 198*   TBILIRUBIN 16.5* 14.6*   LIPASE 412*  --    PREALBUMIN 8.6*  --    GLUCOSE 149* 175*     Recent Labs     04/17/21  0953 04/18/21  0230   WBC 20.5* 19.6*   NEUTSPOLYS 87.60* 95.00*   LYMPHOCYTES 4.00* 1.60*   MONOCYTES 6.40 1.70   EOSINOPHILS 0.00 0.00   BASOPHILS 0.10 0.00   ASTSGOT 119* 121*   ALTSGPT 18 19   ALKPHOSPHAT 217* 198*   TBILIRUBIN 16.5* 14.6*     Recent Labs     04/17/21  0953 04/17/21  1109 04/18/21  0230   RBC 2.43*  --  2.39*   HEMOGLOBIN 8.2*  --  8.1*   HEMATOCRIT 24.4*  --  23.4*   PLATELETCT 309  --  426   PROTHROMBTM  --  19.2*  --    INR  --  1.57*  --        Imaging  X-Ray:  I have personally reviewed the images and compared with prior images.    Assessment/Plan  Septic shock (HCC)- (present on admission)  Assessment & Plan  This is Septic shock Present on admission  SIRS criteria identified on my evaluation include: Tachycardia, with heart rate greater than 90 BPM, Tachypnea, with respirations greater than 20 per minute and Leukocytosis, with WBC greater than 12,000  Source is pulmonary vs SIRS from pancreatitis  Suspected onset of infection (date and time) on arrival  Presentation includes: Severe sepsis present and persistent hypotension after 30 ml/kg completed.   Despite appropriate fluid resuscitation with crystalloid given per sepsis guidelines, the patient remains hypotensive with systolic blood pressure less than 90 or MAP less than 65  Hemodynamic support with additional fluids and IV vasopressors as needed to maintain a SBP of 90 or MAP of 65  IV antibiotics as appropriate for source of sepsis  Reassessment: I have reassessed the patient's hemodynamic status      Acute respiratory failure with hypoxia (HCC)- (present on admission)  Assessment & Plan  Intubated at outside hospital for aspiration and hypoxia  RT/O2 protocols  Daily and PRN ABGs  Titration of ventilator therapy based on ABGs and patient's status  Sedation as tolerated/indicated  Daily  CXR  HOB >30 degrees and peridex for VAP prevention  Pepcid for GI prophylaxis  SAT/SBT when able (ABCDEF Bundle)  Early mobility    Alcohol withdrawal (HCC)- (present on admission)  Assessment & Plan  Patient has history of alcohol abuse since the age of 16 as per the documentation  Was admitted at [facility] on 04/15 for alcohol withdrawal, transferred to [facility] for higher level of care  Was intubated in route secondary to aspiration  On fentanyl and propofol for withdrawal no need for Precedex  Replete electrolytes as needed, monitor sodium   RASS monitoring, sedated on propofol  Thiamine, folate, multivitamin      Alcoholic hepatitis- (present on admission)  Assessment & Plan  AST/ALT is 119/18 and T.Bili of 16.5  Likely due to alcohol  USG ordered to evaluate for obstruction. Might need MRCP if positive  Maddrey's DS is 39, will continue prednisone  MELD score is 29, indicating 19.6% 3 month mortality    Hyponatremia- (present on admission)  Assessment & Plan  Sodium level was 117-121 at the outside hospital  Possibly secondary to cirrhosis  Improved    Aspiration pneumonitis (HCC)  Assessment & Plan  Unclear, as per the EMS person, patient aspirated and was emergently intubated  Pro calcitonin elevated at 1.49, chest x ray is normal   Unclear if it is pneumonia or pneumonitis, continue Unasyn as procalcitonin is elevated    Pancreatitis- (present on admission)  Assessment & Plan  Questionable ?lipase at the outside hospital was 800, however CT abdomen showed no signs of acute pancreatitis  Possibly triggered by alcohol withdrawal  Repeat lipase ordered here    Hypophosphatemia- (present on admission)  Assessment & Plan  Severe  Continue to agressively replete    Elevated bilirubin- (present on admission)  Assessment & Plan  Monitor  RUQ ultrasound    Alcohol abuse- (present on admission)  Assessment & Plan  Since the age of 16 years as per the documentation  Unclear about the amount of consumption  daily  Needs alcohol cessation counseling and resources on discharge       VTE:  Lovenox  Ulcer: H2 Antagonist  Lines: Central Line  Ongoing indication addressed and Sotomayor Catheter  Ongoing indication addressed    I have performed a physical exam and reviewed and updated ROS and Plan today (4/18/2021). In review of yesterday's note (4/17/2021), there are no changes except as documented above.     Discussed patient condition and risk of morbidity and/or mortality with RN, RT, Pharmacy, Charge nurse / hot rounds and Patient     The patient remains critically ill.  Critical care time = 39 minutes in directly providing and coordinating critical care and extensive data review.  No time overlap and excludes procedures.

## 2021-04-18 NOTE — HOSPITAL COURSE
40-year-old male with BMI 39 transferred from [facility] where he was admitted several days ago prior to admission for pancreatitis and alcohol abuse subsequently going into alcohol withdrawal syndrome.  He required multiple doses of Ativan and had a CIWA score of 25, worsening AMS, intubated and transferred here for further management. Post intubation, pt became hypotensive, started on NE gtt. Lipase 800, CT A/P with no evidence of complications of pancreatitis.     4/17 intubated at OSH  4/18 s/p bronched due to increased secretions.   4/19 extubated    4/26: low dose pressors, hemoptysis. Reintubated at night for failure to clear thick bloody secretions and hypoxia.   4/27: bronch no source of bleed in airways, VD2 8/50%, spiking fevers, switch precedex to propofol, C. Diff negative.   4/28: epistaxis ongoing, ENT consult. Worsening shock. 12/100%. Worsening JEYSON. Still febrile. ID consult. Palliative consulted.   4/29: Increasing pressors needs. Anuric. Still epistaxis overnight, transfusing FFP, PRBC. 14/100%. Renal consult. Family wishes full code.   4/30: 14/75%. Vaso and norepi 20. Anuric with rising K and dropping bicarb. Epistaxis finally stopped. Family conversation, they elected to not do HD but will continue full support otherwise while they travel here.  DNR.

## 2021-04-18 NOTE — ASSESSMENT & PLAN NOTE
Intubated at outside hospital for aspiration and hypoxia  4/18 s/p bronch due to increased secretions.   4/20 Extubated   reintubated 4/26 for thick, bloody secretions, hypoxia and failure to manage airway  Hypoxia likely d/t aspiration pneumonitis vs PNA vs pneumonitis from epistaxis    Lung protective ventilation  All appropriate vent bundles  Worsening hypoxia probably due to overload

## 2021-04-18 NOTE — FLOWSHEET NOTE
Ventilator Daily Summary    Vent Day # 2  8 @ 25    APVCMV: 26/440/+10/50%    Weaning trials: Failed SAT    Plan: Continue current ventilator settings and wean mechanical ventilation as tolerated per physician orders.

## 2021-04-18 NOTE — ASSESSMENT & PLAN NOTE
Unclear source  Improving fever curse, ongoing leukocytosis  Vasopressin/levoph for MAP >65       -Continue zosyn, zyvox, micafungin, doxy  -Unclear source.  Blood, urine, sputum, C. Diff negative. No wounds. Changed central line 4/28. Non-con CT abdomen no source.   -Had some purulent drainage c/w sinusitis per ENT, now drained.  He was on zosyn which should have covered sinusitis, so it's less likely that was the only culprit source  -Empiric addition of doxy and micafungin due to severe critical illness and unknown source of infection

## 2021-04-18 NOTE — CARE PLAN
Problem: Skin Integrity  Goal: Risk for impaired skin integrity will decrease  Note: Skin is assessed for integrity throughout the shift. Pt is turned at least every 2 hours. Pt is kept dry and clean.       Problem: Safety - Medical Restraint  Goal: Remains free of injury from restraints (Restraint for Interference with Medical Device)  Description: INTERVENTIONS:  1. Determine that other, less restrictive measures have been tried or would not be effective before applying the restraint  2. Evaluate the patient's condition at the time of restraint application  3. Inform patient/family regarding the reason for restraint  4. Q2H: Monitor safety, psychosocial status, comfort, nutrition and hydration  Outcome: PROGRESSING AS EXPECTED  Flowsheets (Taken 4/18/2021 1133)  Addressed this shift: Remains free of injury from restraints (restraint for interference with medical device):   Determine that other, less restrictive measures have been tried or would not be effective before applying the restraint   Inform patient/family regarding the reason for restraint   Every 2 hours: Monitor safety, psychosocial status, comfort, nutrition and hydration   Evaluate the patient's condition at the time of restraint application

## 2021-04-18 NOTE — WOUND TEAM
Wound consult placed regarding multiple areas of concern. Chart and images reviewed. This RN discussed with bedside RN, Gaby. This RN in to assess patient in T602/00. Pt intubated. All areas of concern assessed. Pt has scattered bruising likely related to PIVs. Webbing between left 1st and 2nd fingers with redness has now decreased in redness and is nearly resolved. Pts bilateral feet cleansed with shower caps and then sween cream applied. No further advanced wound care needs identified. Wound consult completed.

## 2021-04-19 PROBLEM — Z71.89 GOALS OF CARE, COUNSELING/DISCUSSION: Status: ACTIVE | Noted: 2021-01-01

## 2021-04-19 NOTE — CARE PLAN
Problem: Nutritional:  Goal: Nutrition support tolerated and meeting greater than 85% of estimated needs  Outcome: PROGRESSING AS EXPECTED   Peptamen Intense VHP @ 50 mL/hr (rate with Propofol).

## 2021-04-19 NOTE — PROGRESS NOTES
Critical Care Progress Note    Date of admission  4/17/2021    Chief Complaint  40 y.o. male admitted 4/17/2021 with respiratory failure, encephalopathy    Hospital Course  40-year-old male with BMI 39 transferred from [facility] where he was admitted several days ago prior to admission for pancreatitis and alcohol abuse subsequently going into alcohol withdrawal syndrome.  He required multiple doses of Ativan and had a CIWA score of 25, worsening AMS, intubated and transferred here for further management. Post intubation, pt became hypotensive, started on NE gtt. Lipase 800, CT A/P with no evidence of complications of pancreatitis.     4/17 intubated at OSH  4/18 s/p bronched due to increased secretions.       Interval Problem Update  Reviewed last 24 hour events:  Remains critically ill   On full vent support, FiO2 60%, PEEP 10, sat >93%  Had increased resp secretions with increased peak pressures, s/p bronch and thick secretions were removed. BAL was performed. Very friable mucosa  Sedation with propofol and fentanyl  Attempted SAT/SBT today but pt went to RASS 3+ to 4+ in 15 mins.   Febrile 38.1C overnight, but afebrile this am.   HR in 90s, SBP in 100-130s.   On NE gtt at 4mcg/min to keep MAP >65.   Attempted to keep off levo this am.   WBC 21K   Plt 400k  Sodium 133 (from 130)  HCO3 34  Creatinine 0.48,  Good UOP, 2L UOP with lasix 80 BID.   I/O negative 3L and negative 4L since admission  Tube feed at goal.   No BM  On unasyn transitioned to augmentin. Treat for total day 5  On prednisolone day 3 out 28      Review of Systems  Review of Systems   Unable to perform ROS: Intubated        Vital Signs for last 24 hours   Temp:  [37.5 °C (99.5 °F)-38.1 °C (100.6 °F)] 38.1 °C (100.6 °F)  Pulse:  [] 95  Resp:  [18-27] 26  BP: ()/(49-73) 114/61  SpO2:  [85 %-99 %] 97 %    Hemodynamic parameters for last 24 hours  CVP:  [13 MM HG-300 MM HG] 22 MM HG    Respiratory Information for the last 24  hours  Vent Mode: APVCMV  Rate (breaths/min): 26  Vt Target (mL): 440  PEEP/CPAP: 10  MAP: 18  Control VTE (exp VT): 440    Physical Exam   Physical Exam  Vitals and nursing note reviewed.   Constitutional:       General: He is in acute distress.      Appearance: He is well-developed. He is ill-appearing.      Interventions: He is intubated.   HENT:      Head: Normocephalic and atraumatic.      Right Ear: External ear normal.      Left Ear: External ear normal.      Nose:      Comments: Cortrak in place     Mouth/Throat:      Mouth: Mucous membranes are dry.      Pharynx: Oropharynx is clear.      Comments: ET tube in position  Eyes:      Conjunctiva/sclera: Conjunctivae normal.      Pupils: Pupils are equal, round, and reactive to light.   Neck:      Vascular: No JVD.      Trachea: No tracheal deviation.   Cardiovascular:      Rate and Rhythm: Normal rate and regular rhythm.      Pulses: Normal pulses.   Pulmonary:      Effort: He is intubated.      Breath sounds: Rales present. No wheezing.   Abdominal:      General: Bowel sounds are normal. There is distension.      Palpations: Abdomen is soft.      Tenderness: There is abdominal tenderness.   Musculoskeletal:         General: No tenderness.      Cervical back: Neck supple.   Skin:     General: Skin is warm and dry.      Capillary Refill: Capillary refill takes less than 2 seconds.      Findings: No rash.   Neurological:      General: No focal deficit present.      Mental Status: He is alert.      Cranial Nerves: No cranial nerve deficit.      Sensory: No sensory deficit.      Motor: No weakness.      Comments: Opens eyes to deep painful stimuli.  Localizing to pain         Medications  Current Facility-Administered Medications   Medication Dose Route Frequency Provider Last Rate Last Admin   • albuterol (PROVENTIL) 2.5mg/0.5ml nebulizer solution 2.5 mg  2.5 mg Nebulization Q4H PRN (RT) Evin Hammer M.D.       • potassium phosphate 15 mmol in dextrose 5%  250 mL ivpb  15 mmol Intravenous Once Miky Gama D.O.       • potassium chloride in water (KCL) ivpb **Administer in ICU only** 20 mEq  20 mEq Intravenous Once Miky Gama D.O.       • amoxicillin-clavulanate (AUGMENTIN) 875-125 MG per tablet 1 tablet  1 tablet Enteral Tube Q12HRS SARIAH Basurto Jr..OIsiah   1 tablet at 04/19/21 0518   • Respiratory Therapy Consult   Nebulization Continuous RT Jeremy M Gonda, M.D.       • famotidine (PEPCID) tablet 20 mg  20 mg Enteral Tube Q12HRS Jeremy M Gonda, M.D.   20 mg at 04/19/21 0518    Or   • famotidine (PEPCID) injection 20 mg  20 mg Intravenous Q12HRS Jeremy M Gonda, M.D.   20 mg at 04/17/21 1712   • senna-docusate (PERICOLACE or SENOKOT S) 8.6-50 MG per tablet 2 tablet  2 tablet Enteral Tube BID Jeremy M Gonda, M.D.   2 tablet at 04/19/21 0518    And   • polyethylene glycol/lytes (MIRALAX) PACKET 1 Packet  1 Packet Enteral Tube QDAY PRN Jeremy M Gonda, M.D.        And   • magnesium hydroxide (MILK OF MAGNESIA) suspension 30 mL  30 mL Enteral Tube QDAY PRN Jeremy M Gonda, M.D.        And   • bisacodyl (DULCOLAX) suppository 10 mg  10 mg Rectal QDAY PRN Jeremy M Gonda, M.D.       • MD Alert...ICU Electrolyte Replacement per Pharmacy   Other PHARMACY TO DOSE Jeremy M Gonda, M.D.       • lidocaine (XYLOCAINE) 1 % injection 1-2 mL  1-2 mL Tracheal Tube Q30 MIN PRN Jeremy M Gonda, M.D.       • Pharmacy Consult: Enteral tube insertion - review meds/change route/product selection  1 Each Other PHARMACY TO DOSE Jeremy M Gonda, M.D.       • fentaNYL (SUBLIMAZE) injection 100 mcg  100 mcg Intravenous Q15 MIN PRN Jeremy M Gonda, M.D.   100 mcg at 04/18/21 0054    And   • fentaNYL (SUBLIMAZE) injection 200 mcg  200 mcg Intravenous Q15 MIN PRN Jeremy M Gonda, M.D.   200 mcg at 04/19/21 0325    And   • fentaNYL (SUBLIMAZE) 50 mcg/mL in 50mL (Continuous Infusion)   Intravenous Continuous Jeremy M Gonda, M.D. 4 mL/hr at 04/19/21 0436 200 mcg/hr at 04/19/21 0436    And   • propofol  (DIPRIVAN) injection  0-80 mcg/kg/min Intravenous Continuous Jeremy M Gonda, M.D. 24.1 mL/hr at 04/19/21 0511 30 mcg/kg/min at 04/19/21 0511   • norepinephrine (Levophed) infusion 8 mg/250 mL (premix)  0-30 mcg/min Intravenous Continuous Jeremy M Gonda, M.D. 7.5 mL/hr at 04/19/21 0618 4 mcg/min at 04/19/21 0618   • thiamine (Vitamin B-1) tablet 100 mg  100 mg Enteral Tube DAILY Radha Beckett M.D.   100 mg at 04/19/21 0518   • folic acid (FOLVITE) tablet 1 mg  1 mg Enteral Tube DAILY Radha Beckett M.D.   1 mg at 04/19/21 0518   • prednisoLONE (PRELONE) 15 MG/5ML syrup 39.9 mg  39.9 mg Enteral Tube DAILY Jeremy M Gonda, M.D.   Stopped at 04/19/21 0600   • enoxaparin (LOVENOX) inj 40 mg  40 mg Subcutaneous Q12HRS Jeremy M Gonda, M.D.   40 mg at 04/19/21 0517   • furosemide (LASIX) injection 80 mg  80 mg Intravenous Q12HRS Jeremy M Gonda, M.D.   80 mg at 04/19/21 0517       Fluids    Intake/Output Summary (Last 24 hours) at 4/19/2021 0705  Last data filed at 4/19/2021 0701  Gross per 24 hour   Intake 3174.08 ml   Output 4885 ml   Net -1710.92 ml       Laboratory  Recent Labs     04/17/21  1127   ISTATAPH 7.412   ISTATAPCO2 52.6*   ISTATAPO2 75   ISTATATCO2 35*   NMYWVUP6MVN 95   ISTATARTHCO3 33.5*   ISTATARTBE 8*   ISTATTEMP 36.6 C   ISTATFIO2 55   ISTATSPEC Arterial   ISTATAPHTC 7.418   ZREWUMFE4IA 73     Recent Labs     04/18/21  1145   CPKTOTAL 65     Recent Labs     04/17/21  0953 04/18/21  0230 04/18/21  1322 04/19/21  0445   SODIUM 125* 130*  --  133*   POTASSIUM 4.3 3.4*  --  3.3*   CHLORIDE 85* 89*  --  91*   CO2 32 29  --  34*   BUN 7* 11  --  17   CREATININE 0.35* 0.56  --  0.48*   MAGNESIUM  --  2.3  --  2.1   PHOSPHORUS  --  0.8* 1.2* 2.4*   CALCIUM 8.0* 8.2*  --  8.0*     Recent Labs     04/17/21  0953 04/18/21  0230 04/19/21  0445   ALTSGPT 18 19 23   ASTSGOT 119* 121* 141*   ALKPHOSPHAT 217* 198* 188*   TBILIRUBIN 16.5* 14.6* 12.6*   LIPASE 412*  --   --    PREALBUMIN 8.6*  --   --     GLUCOSE 149* 175* 162*     Recent Labs     04/17/21  0953 04/18/21  0230 04/19/21  0445   WBC 20.5* 19.6* 21.3*   NEUTSPOLYS 87.60* 95.00* 68.60   LYMPHOCYTES 4.00* 1.60* 11.00*   MONOCYTES 6.40 1.70 4.20   EOSINOPHILS 0.00 0.00 0.90   BASOPHILS 0.10 0.00 0.00   ASTSGOT 119* 121* 141*   ALTSGPT 18 19 23   ALKPHOSPHAT 217* 198* 188*   TBILIRUBIN 16.5* 14.6* 12.6*     Recent Labs     04/17/21  0953 04/17/21  1109 04/18/21  0230 04/19/21  0445   RBC 2.43*  --  2.39* 2.33*   HEMOGLOBIN 8.2*  --  8.1* 7.9*   HEMATOCRIT 24.4*  --  23.4* 23.0*   PLATELETCT 309  --  426 405   PROTHROMBTM  --  19.2*  --   --    INR  --  1.57*  --   --        Imaging  X-Ray:  I have personally reviewed the images and compared with prior images.    Assessment/Plan  Septic shock (HCC)- (present on admission)  Assessment & Plan  This is Septic shock Present on admission  SIRS criteria identified on my evaluation include: Tachycardia, with heart rate greater than 90 BPM, Tachypnea, with respirations greater than 20 per minute and Leukocytosis, with WBC greater than 12,000  Source is pulmonary vs SIRS from pancreatitis  Suspected onset of infection (date and time) on arrival  Presentation includes: Severe sepsis present and persistent hypotension after 30 ml/kg completed.   Despite appropriate fluid resuscitation with crystalloid given per sepsis guidelines, the patient remains hypotensive with systolic blood pressure less than 90 or MAP less than 65  Hemodynamic support with additional fluids and IV vasopressors as needed to maintain a SBP of 90 or MAP of 65  IV antibiotics as appropriate for source of sepsis  Reassessment: I have reassessed the patient's hemodynamic status      Acute respiratory failure with hypoxia (HCC)- (present on admission)  Assessment & Plan  Intubated at outside hospital for aspiration and hypoxia  4/18 s/p bronch due to increased secretions.     Plan:   Continue full vent support, low tidal volume strategy.   Goal sat  >88%, paO2 >60  Sedation with propofol and fentanyl   Attempted SAT/SBT today, failed due to RASS >2+  HOB >30 degrees and peridex for VAP prevention  Pepcid for GI prophylaxis  SAT/SBT when able (ABCDEF Bundle)  Early mobility  Tolerating TF at goal rate    Alcohol withdrawal (HCC)- (present on admission)  Assessment & Plan  Patient has history of alcohol abuse since the age of 16 as per the documentation  Hx of large alcohol abuse, 12 packs of beers/day and 1pint of alcohol daily, last drink a day of admission.   Intubated at OSH due to acute resp failure due to aspiration     Plan:   On propofol and fentanyl. Daily SAT/SBT   Hydrations  Vitamins, thiamine, folate  Keep K >4 and Mg >2  High risk for seizures          Alcoholic hepatitis- (present on admission)  Assessment & Plan  AST/ALT is 119/18 and T.Bili of 16.5  Likely due to alcohol  USG ordered to evaluate for obstruction. Might need MRCP if positive  Maddrey's DS is 39, will continue prednisone  MELD score is 29, indicating 19.6% 3 month mortality  Start lactulose 30 TID, titrate to BM x3/day.     Hyponatremia- (present on admission)  Assessment & Plan  Sodium level was 117-121 at the outside hospital  Likely due to his alcoholic cirrhosis.   Improved. Monitor    Aspiration pneumonitis (HCC)  Assessment & Plan  Unclear, as per the EMS person, patient aspirated and was emergently intubated  Pro calcitonin elevated at 1.49, chest x ray is normal   Unclear if it is pneumonia or pneumonitis, continue Unasyn as procalcitonin is elevated  On augmentin to finish 5 day course    Pancreatitis- (present on admission)  Assessment & Plan  Questionable ?lipase at the outside hospital was 800, however CT abdomen showed no signs of acute pancreatitis   Possibly triggered by alcohol withdrawal  Repeat lipase here was 412    Goals of care, counseling/discussion  Assessment & Plan  4/19 discussed with Bryon, brother and sister in law on the phone and updated pt's critical  condition. Mother is coming tonight. All questions were answered    Hypophosphatemia- (present on admission)  Assessment & Plan  Severe  Continue to agressively replete    Elevated bilirubin- (present on admission)  Assessment & Plan  Monitor  RUQ ultrasound    Alcohol abuse- (present on admission)  Assessment & Plan  Since the age of 16 years as per the documentation  Pt is actively drinking. 12packs of beer daily and a pint of hard liquor daily.    Last drink was before admission   Needs alcohol cessation counseling and resources on discharge       VTE:  Lovenox  Ulcer: H2 Antagonist  Lines: Central Line  Ongoing indication addressed and Sotomayor Catheter  Ongoing indication addressed    I have performed a physical exam and reviewed and updated ROS and Plan today (4/19/2021). In review of yesterday's note (4/18/2021), there are no changes except as documented above.     Discussed patient condition and risk of morbidity and/or mortality with RN, RT, Pharmacy, Charge nurse / hot rounds and Patient     The patient remains critically ill.  Critical care time = 65 minutes in directly providing and coordinating critical care and extensive data review.  No time overlap and excludes procedures.

## 2021-04-19 NOTE — PROCEDURES
Procedures      Procedure     Fiberoptic bronchoscopy with bronchoalveolar lavage     Pre-procedure diagnosis: Respiratory failure  Post-procedure diagnosis: Respiratory failure     Indication for procedure: Increased respiratory secretions; evaluate for infection     Universal protocol:  - Anonymous protocol with patient's name was matched to armband  - Timeout called just prior to procedure     Pre-procedure:  - All equipment was available  - All imaging and diagnostics were available and reviewed  - Oxygenated with 100% FiO2 on the ventilator     Procedural sedation:  - Patient was on continuous sedation with propofol and fentanyl     Procedure:  - Advanced fiberoptic bronchoscope via endotracheal tube  - Evaluated bilateral lungs  - BAL performed to RLL  - Fiberoptic bronchoscope was withdrawn intact     Findings:  - Thick secretion  - Very friable mucosa  - All lobes inspected     Complications:  - None

## 2021-04-19 NOTE — PROGRESS NOTES
During SAT and SBT, pt became very agitated, RASS +3, diaphoretic, 's. Pt desat'ed to 91% with bloody thick mucous plugs in the ETT tubing. Suction endotracheally.  Sedation restarted, vent mode back to APVCMV by RT. Pt was on Spont mode for about 16 minutes.

## 2021-04-19 NOTE — CARE PLAN
Ventilator Daily Summary     Vent Day # 3      APVCMV: 24/440/+8/50%     Weaning trials: Failed SBT     Plan: Continue current ventilator settings and wean mechanical ventilation as tolerated per physician orders.

## 2021-04-19 NOTE — PROGRESS NOTES
Bronchoscopy performed at bedside by Dr. Hammer. Consent verified. Time out called. Propofol infusion increased to 80mcg/kg/min. BP cuff interval changed to q5 minutes. PRN 200mcg fentanyl given x1. Pt tolerated well. BAL sent.

## 2021-04-19 NOTE — PROGRESS NOTES
Brother Bryon came bedside. Updated on POC. Bryon would like MD to call him with updates or pt's mother Hazel.

## 2021-04-19 NOTE — ASSESSMENT & PLAN NOTE
4/19 discussed with Bryon, brother and sister in law on the phone and updated pt's critical condition. Mother is coming tonight. All questions were answered  4/20 mother was at bedside and I updated her on patient's condition. All questions were answered  4/22 mother was at bedside and I updated her on patient's condition. All questions were answered  4/23 had a long discussion with brother and mother to update about patient's critical condition.  4/28 discussed with mom again about how much worse he is getting.  Recommended DNR as I do not think CPR would be beneficial in this situation (patient is not a transplant candidate, and this is all a result of his liver disease and alcohol). Mom is in agreement but wants to discuss with patient's brothers  4/29: Family would like patient to remain full code.  4/30: Long family conversation with palliative and ICU. Family elected to not pursue HD because of his very poor prognosis even with HD. DNR but continue current level of support so family can come say goodbye.   5/2: Family plans to come in this afternoon to discuss comfort

## 2021-04-19 NOTE — PROGRESS NOTES
Critical Care Event Note    Called to the bedside by nursing and RT staffs for increased respiratory secretions and with increased peak pressures on the ventilator.  CXR reviewed and diffuse infiltrates.  Will give bronchodilators and perform bronchoscopy with BAL.  Patient is already on antibiotics.    Patient is critically ill.  The critical that has been undertaken is medically complex.  There has been no overlap in critical care time.  Critical Care Time not including procedures: 33 minutes

## 2021-04-20 NOTE — PROGRESS NOTES
Orders to extubate the patient . Pt is agitated but follows commands, awake alert.   Extubated by RT at 0730 am.   10 L oxymask currently sat'ing 91-92%.

## 2021-04-20 NOTE — CARE PLAN
Problem: Pain Management  Goal: Pain level will decrease to patient's comfort goal  Note: Pain is assessed throughout the shift and per unit protocol. Pharmacological and non-pharmacological measures to treat pain are offered to patient. Pt is medicated per MAR.         Problem: Psychosocial Needs:  Goal: Level of anxiety will decrease  Note: Anxiety triggers identified. Calming techniques provided to patient.

## 2021-04-20 NOTE — DISCHARGE PLANNING
Care Transition Team Discharge Planning    Anticipated Discharge Disposition: TBD    Action: Per AM rounds, pt has been extubated. He has lost of secretions. Pt is not A/O. Pt has BMS in place. SLP will be assessing pt today. BS RN will attempt to mobilize today. Mom was here yesterday, and will most likely return today.     Barriers to Discharge: medical clearance/stability    Plan: Lsw will continue to follow, attend rounds for medical updates, and assist w/ d/c planning.

## 2021-04-20 NOTE — CARE PLAN
Ventilator Daily Summary     Vent Day # 4    ETT: 8.0 @ 24      APVCMV: 26/440/+8/50%     Weaning trials: Failed SBT     Plan: Continue current ventilator settings and wean mechanical ventilation as tolerated per physician orders.

## 2021-04-20 NOTE — CARE PLAN
Problem: Communication  Goal: The ability to communicate needs accurately and effectively will improve  Outcome: PROGRESSING AS EXPECTED     Problem: Safety  Goal: Will remain free from injury  Outcome: PROGRESSING AS EXPECTED  Goal: Will remain free from falls  Outcome: PROGRESSING AS EXPECTED     Problem: Venous Thromboembolism (VTW)/Deep Vein Thrombosis (DVT) Prevention:  Goal: Patient will participate in Venous Thrombosis (VTE)/Deep Vein Thrombosis (DVT)Prevention Measures  Outcome: PROGRESSING AS EXPECTED     Problem: Bowel/Gastric:  Goal: Normal bowel function is maintained or improved  Outcome: PROGRESSING AS EXPECTED  Goal: Will not experience complications related to bowel motility  Outcome: PROGRESSING AS EXPECTED     Problem: Respiratory:  Goal: Respiratory status will improve  Outcome: PROGRESSING AS EXPECTED     Problem: Skin Integrity  Goal: Risk for impaired skin integrity will decrease  Outcome: PROGRESSING AS EXPECTED     Problem: Safety - Medical Restraint  Goal: Remains free of injury from restraints (Restraint for Interference with Medical Device)  Description: INTERVENTIONS:  1. Determine that other, less restrictive measures have been tried or would not be effective before applying the restraint  2. Evaluate the patient's condition at the time of restraint application  3. Inform patient/family regarding the reason for restraint  4. Q2H: Monitor safety, psychosocial status, comfort, nutrition and hydration  Outcome: PROGRESSING AS EXPECTED  Goal: Free from restraint(s) (Restraint for Interference with Medical Device)  Description: INTERVENTIONS:  1. ONCE/SHIFT or MINIMUM Q12H: Assess and document the continuing need for restraints  2. Q24H: Continued use of restraint requires LIP to perform face to face examination and written order  3. Identify and implement measures to help patient regain control  Outcome: PROGRESSING AS EXPECTED     Problem: Pain Management  Goal: Pain level will decrease  to patient's comfort goal  Outcome: PROGRESSING AS EXPECTED

## 2021-04-20 NOTE — PROGRESS NOTES
Critical Care Progress Note    Date of admission  4/17/2021    Chief Complaint  40 y.o. male admitted 4/17/2021 with respiratory failure, encephalopathy    Hospital Course  40-year-old male with BMI 39 transferred from [facility], [place] where he was admitted several days ago prior to admission for pancreatitis and alcohol abuse subsequently going into alcohol withdrawal syndrome.  He required multiple doses of Ativan and had a CIWA score of 25, worsening AMS, intubated and transferred here for further management. Post intubation, pt became hypotensive, started on NE gtt. Lipase 800, CT A/P with no evidence of complications of pancreatitis.     4/17 intubated at OSH  4/18 s/p bronched due to increased secretions.       Interval Problem Update  Reviewed last 24 hour events:  No acute events overnight  Doing well on SAT/SBT this am, and patient was subsequently extubated  Throughout the day, pt was confused but reorientable  Pulled his cortrak.   Precedex gtt was started.   On oxymask 12L   On Lasix 80 BID with good UOP  Remains on NE gtt to keep MAP >65  On augmentin, will switch back to unasyn due to no enteral access.   On prednisolone day 4 out 28      Review of Systems  Review of Systems   Unable to perform ROS: Intubated        Vital Signs for last 24 hours   Temp:  [37.4 °C (99.3 °F)-37.6 °C (99.7 °F)] 37.6 °C (99.7 °F)  Pulse:  [] 108  Resp:  [13-29] 17  BP: ()/(48-81) 94/58  SpO2:  [87 %-98 %] 91 %    Hemodynamic parameters for last 24 hours  CVP:  [-1 MM HG-300 MM HG] 7 MM HG    Respiratory Information for the last 24 hours  Vent Mode: Spont  Rate (breaths/min): 26  Vt Target (mL): 440  PEEP/CPAP: 8  P Support: 8  MAP: 11  Control VTE (exp VT): 443    Physical Exam   Physical Exam  Vitals and nursing note reviewed.   Constitutional:       General: He is not in acute distress.     Appearance: He is well-developed. He is ill-appearing.      Interventions: He is intubated.   HENT:      Head:  Normocephalic and atraumatic.      Nose:      Comments: Cortrak in place     Mouth/Throat:      Mouth: Mucous membranes are dry.      Comments: ET tube in position  Neck:      Vascular: No JVD.      Trachea: No tracheal deviation.   Cardiovascular:      Rate and Rhythm: Normal rate and regular rhythm.      Pulses: Normal pulses.   Pulmonary:      Effort: He is intubated.      Breath sounds: Rales present. No wheezing.   Abdominal:      General: Bowel sounds are normal. There is distension.      Palpations: Abdomen is soft.      Tenderness: There is abdominal tenderness. There is no guarding.   Musculoskeletal:         General: No tenderness.      Cervical back: Neck supple.      Right lower leg: Edema present.      Left lower leg: Edema present.   Skin:     General: Skin is warm and dry.      Capillary Refill: Capillary refill takes less than 2 seconds.      Findings: No rash.   Neurological:      General: No focal deficit present.      Mental Status: He is alert.      Cranial Nerves: No cranial nerve deficit.      Sensory: No sensory deficit.      Motor: No weakness.      Comments: Opens eyes to deep painful stimuli.  Localizing to pain       I reevaluated the patient after extubation. Pt appears comfortable though CAM ICU negative.     Medications  Current Facility-Administered Medications   Medication Dose Route Frequency Provider Last Rate Last Admin   • potassium phosphate 15 mmol in  mL ivpb  15 mmol Intravenous Once SARIAH Bradford.O.       • potassium chloride in water (KCL) ivpb **Administer in ICU only** 20 mEq  20 mEq Intravenous Once CIERA BradfordO. 50 mL/hr at 04/20/21 0713 20 mEq at 04/20/21 0713   • dexmedetomidine (PRECEDEX) 400 mcg/100mL NS premix infusion  0.1-1.5 mcg/kg/hr Intravenous Continuous SARIAH Bradford.O.       • albuterol (PROVENTIL) 2.5mg/0.5ml nebulizer solution 2.5 mg  2.5 mg Nebulization Q4H PRN (RT) Evin Hammer M.D.   2.5 mg at 04/20/21 0758   • potassium bicarbonate  (KLYTE) effervescent tablet 25 mEq  25 mEq Enteral Tube BID SARIAH Bradford.OIsiah   25 mEq at 04/20/21 0518   • lactulose 20 GM/30ML solution 30 mL  30 mL Enteral Tube TID SARIAH Bradford.O.   30 mL at 04/20/21 0518   • amoxicillin-clavulanate (AUGMENTIN) 875-125 MG per tablet 1 tablet  1 tablet Enteral Tube Q12HRS Terrance Pierce Jr., D.O.   1 tablet at 04/20/21 0518   • Respiratory Therapy Consult   Nebulization Continuous RT Jeremy M Gonda, M.D.       • famotidine (PEPCID) tablet 20 mg  20 mg Enteral Tube Q12HRS Jeremy M Gonda, M.D.   20 mg at 04/20/21 0518    Or   • famotidine (PEPCID) injection 20 mg  20 mg Intravenous Q12HRS Jeremy M Gonda, M.D.   20 mg at 04/17/21 1712   • MD Alert...ICU Electrolyte Replacement per Pharmacy   Other PHARMACY TO DOSE Jeremy M Gonda, M.D.       • lidocaine (XYLOCAINE) 1 % injection 1-2 mL  1-2 mL Tracheal Tube Q30 MIN PRN Jeremy M Gonda, M.D.       • Pharmacy Consult: Enteral tube insertion - review meds/change route/product selection  1 Each Other PHARMACY TO DOSE Jeremy M Gonda, M.D.       • fentaNYL (SUBLIMAZE) injection 100 mcg  100 mcg Intravenous Q15 MIN PRN Jeremy M Gonda, M.D.   100 mcg at 04/18/21 0054    And   • fentaNYL (SUBLIMAZE) injection 200 mcg  200 mcg Intravenous Q15 MIN PRN Jeremy M Gonda, M.D.   200 mcg at 04/19/21 0325    And   • fentaNYL (SUBLIMAZE) 50 mcg/mL in 50mL (Continuous Infusion)   Intravenous Continuous Jeremy M Gonda, M.D.   Stopped at 04/20/21 0520    And   • propofol (DIPRIVAN) injection  0-80 mcg/kg/min Intravenous Continuous Jeremy M Gonda, M.D.   Stopped at 04/20/21 0520   • norepinephrine (Levophed) infusion 8 mg/250 mL (premix)  0-30 mcg/min Intravenous Continuous Jeremy M Gonda, M.D.   Stopped at 04/20/21 0720   • thiamine (Vitamin B-1) tablet 100 mg  100 mg Enteral Tube DAILY Radha Beckett M.D.   100 mg at 04/20/21 0518   • folic acid (FOLVITE) tablet 1 mg  1 mg Enteral Tube DAILY Radha Beckett M.D.   1 mg at 04/20/21 0518   •  prednisoLONE (PRELONE) 15 MG/5ML syrup 39.9 mg  39.9 mg Enteral Tube DAILY Jeremy M Gonda, M.D.   39.9 mg at 04/19/21 2008   • enoxaparin (LOVENOX) inj 40 mg  40 mg Subcutaneous Q12HRS Jeremy M Gonda, M.D.   40 mg at 04/20/21 0517   • furosemide (LASIX) injection 80 mg  80 mg Intravenous Q12HRS Jeremy M Gonda, M.D.   80 mg at 04/20/21 0518       Fluids    Intake/Output Summary (Last 24 hours) at 4/20/2021 0910  Last data filed at 4/20/2021 0854  Gross per 24 hour   Intake 2879 ml   Output 3610 ml   Net -731 ml       Laboratory  Recent Labs     04/18/21  2340 04/19/21  0247 04/20/21  0330   ISTATAPH 7.490 7.493 7.554*   ISTATAPCO2 46.9* 49.6* 42.3*   ISTATAPO2 171* 213* 67   ISTATATCO2 37* 39* 39*   LJEJAAV4SEW 100* 100* 95   ISTATARTHCO3 35.8* 38.0* 37.4*   ISTATARTBE 11* 13* 14*   ISTATTEMP 38.1 C 37.4 C 37.6 C   ISTATFIO2 100 100 50   ISTATSPEC Arterial Arterial Arterial   ISTATAPHTC 7.473 7.487 7.544*   LCYHOYVP8XY 177* 215* 70     Recent Labs     04/18/21  1145   CPKTOTAL 65     Recent Labs     04/18/21  0230 04/18/21  0230 04/18/21  1322 04/19/21  0445 04/20/21  0310   SODIUM 130*  --   --  133* 133*   POTASSIUM 3.4*  --   --  3.3* 3.1*   CHLORIDE 89*  --   --  91* 90*   CO2 29  --   --  34* 32   BUN 11  --   --  17 17   CREATININE 0.56  --   --  0.48* 0.53   MAGNESIUM 2.3  --   --  2.1 2.1   PHOSPHORUS 0.8*   < > 1.2* 2.4* 2.4*   CALCIUM 8.2*  --   --  8.0* 8.3*    < > = values in this interval not displayed.     Recent Labs     04/17/21  0953 04/17/21  0953 04/18/21  0230 04/19/21 0445 04/20/21  0310   ALTSGPT 18   < > 19 23 24   ASTSGOT 119*   < > 121* 141* 145*   ALKPHOSPHAT 217*   < > 198* 188* 172*   TBILIRUBIN 16.5*   < > 14.6* 12.6* 12.3*   LIPASE 412*  --   --   --   --    PREALBUMIN 8.6*  --   --   --   --    GLUCOSE 149*   < > 175* 162* 173*    < > = values in this interval not displayed.     Recent Labs     04/18/21  0230 04/19/21 0445 04/20/21  0310   WBC 19.6* 21.3* 16.4*   NEUTSPOLYS 95.00*  68.60 86.30*   LYMPHOCYTES 1.60* 11.00* 1.70*   MONOCYTES 1.70 4.20 2.60   EOSINOPHILS 0.00 0.90 0.00   BASOPHILS 0.00 0.00 0.00   ASTSGOT 121* 141* 145*   ALTSGPT 19 23 24   ALKPHOSPHAT 198* 188* 172*   TBILIRUBIN 14.6* 12.6* 12.3*     Recent Labs     04/17/21  0953 04/17/21  1109 04/18/21  0230 04/19/21  0445 04/20/21  0310   RBC   < >  --  2.39* 2.33* 2.43*   HEMOGLOBIN   < >  --  8.1* 7.9* 8.2*   HEMATOCRIT   < >  --  23.4* 23.0* 24.3*   PLATELETCT   < >  --  426 405 375   PROTHROMBTM  --  19.2*  --   --   --    INR  --  1.57*  --   --   --     < > = values in this interval not displayed.       Imaging  X-Ray:  I have personally reviewed the images and compared with prior images.    Assessment/Plan  Septic shock (HCC)- (present on admission)  Assessment & Plan  This is Septic shock Present on admission  SIRS criteria identified on my evaluation include: Tachycardia, with heart rate greater than 90 BPM, Tachypnea, with respirations greater than 20 per minute and Leukocytosis, with WBC greater than 12,000  Source is pulmonary vs SIRS from pancreatitis  Suspected onset of infection (date and time) on arrival  Presentation includes: Severe sepsis present and persistent hypotension after 30 ml/kg completed.   Despite appropriate fluid resuscitation with crystalloid given per sepsis guidelines, the patient remains hypotensive with systolic blood pressure less than 90 or MAP less than 65  Hemodynamic support with additional fluids and IV vasopressors as needed to maintain a SBP of 90 or MAP of 65  IV antibiotics as appropriate for source of sepsis  Reassessment: I have reassessed the patient's hemodynamic status      Acute respiratory failure with hypoxia (HCC)- (present on admission)  Assessment & Plan  Intubated at outside hospital for aspiration and hypoxia  4/18 s/p bronch due to increased secretions.   Extubated 4/20    Plan:   On Oxymask 12L post extubation.   Monitor closely for reintubation  Will likely need  paracentesis to help with increased WOB  Goal sat >88%, paO2 >60  Early mobility  Attempt to reinsert TF for enteral access    Alcohol withdrawal (HCC)- (present on admission)  Assessment & Plan  Patient has history of alcohol abuse since the age of 16 as per the documentation  Hx of large alcohol abuse, 12 packs of beers/day and 1pint of alcohol daily, last drink a day of admission.   Intubated at OSH due to acute resp failure due to aspiration  Extubated 4/20     Plan:   Precedex gtt  Vitamins, thiamine, folate  Keep K >4 and Mg >2  High risk for seizures  Non pharmacological therapy for delirium          Alcoholic hepatitis- (present on admission)  Assessment & Plan  AST/ALT is 119/18 and T.Bili of 16.5  Likely due to alcohol  USG ordered to evaluate for obstruction. Might need MRCP if positive  Maddrey's DS is 39, will continue prednisone  MELD score is 29, indicating 19.6% 3 month mortality  Start lactulose 30 TID, titrate to BM x3/day.     Hyponatremia- (present on admission)  Assessment & Plan  Sodium level was 117-121 at the outside hospital  Likely due to his alcoholic cirrhosis.   Improved. Monitor    Aspiration pneumonitis (HCC)  Assessment & Plan  Unclear, as per the EMS person, patient aspirated and was emergently intubated  Pro calcitonin elevated at 1.49, chest x ray is normal   Unclear if it is pneumonia or pneumonitis, continue Unasyn as procalcitonin is elevated  On augmentin to finish 5 day course  Given lost of enteral access, will reinsert cortrak. Meanwhile will change to unasyn    Pancreatitis- (present on admission)  Assessment & Plan  Questionable ?lipase at the outside hospital was 800, however CT abdomen showed no signs of acute pancreatitis   Possibly triggered by alcohol withdrawal  Repeat lipase here was 412    Goals of care, counseling/discussion  Assessment & Plan  4/19 discussed with Bryon, brother and sister in law on the phone and updated pt's critical condition. Mother is coming  tonight. All questions were answered    Hypophosphatemia- (present on admission)  Assessment & Plan  Severe  Continue to agressively replete    Elevated bilirubin- (present on admission)  Assessment & Plan  Monitor  RUQ ultrasound    Alcohol abuse- (present on admission)  Assessment & Plan  Since the age of 16 years as per the documentation  Pt is actively drinking. 12packs of beer daily and a pint of hard liquor daily.    Last drink was before admission   Needs alcohol cessation counseling and resources on discharge       VTE:  Lovenox  Ulcer: H2 Antagonist  Lines: Central Line  Ongoing indication addressed and Sotomayor Catheter  Ongoing indication addressed    I have performed a physical exam and reviewed and updated ROS and Plan today (4/20/2021). In review of yesterday's note (4/19/2021), there are no changes except as documented above.     Discussed patient condition and risk of morbidity and/or mortality with RN, RT, Pharmacy, Charge nurse / hot rounds and Patient     The patient remains critically ill.  Critical care time = 50 minutes in directly providing and coordinating critical care and extensive data review.  No time overlap and excludes procedures.

## 2021-04-20 NOTE — PROGRESS NOTES
Patient removed Cortrak . Noted oxymask on pt's head.  TF stopped. Will keep NPO at this time. Pt educated on leaving the lines and tubes in place. Pt is drowsy but able to state his name and year.

## 2021-04-20 NOTE — CARE PLAN
Problem: Psychosocial Needs:  Goal: Level of anxiety will decrease  4/20/2021 0740 by Gaby Jama R.N.  Note: Anxiety triggers identified. Calming techniques provided to patient. Patient is involved in POC and is encouraged to verbalize questions and concerns.    4/19/2021 1841 by Gaby Jama R.N.  Note: Anxiety triggers identified. Calming techniques provided to patient.      Problem: Pain Management  Goal: Pain level will decrease to patient's comfort goal  Note: Pain is assessed throughout the shift and per unit protocol. Pharmacological and non-pharmacological measures to treat pain are offered to patient. Pt is medicated per MAR.         Problem: Safety - Medical Restraint  Goal: Remains free of injury from restraints (Restraint for Interference with Medical Device)  Description: INTERVENTIONS:  1. Determine that other, less restrictive measures have been tried or would not be effective before applying the restraint  2. Evaluate the patient's condition at the time of restraint application  3. Inform patient/family regarding the reason for restraint  4. Q2H: Monitor safety, psychosocial status, comfort, nutrition and hydration  4/20/2021 0740 by Gaby Jama R.N.  Flowsheets (Taken 4/20/2021 0740)  Addressed this shift: Remains free of injury from restraints (restraint for interference with medical device):   Determine that other, less restrictive measures have been tried or would not be effective before applying the restraint   Evaluate the patient's condition at the time of restraint application   Inform patient/family regarding the reason for restraint   Every 2 hours: Monitor safety, psychosocial status, comfort, nutrition and hydration  Note: Educated pt on safety related to restraints. Q 2hrs evaluation of skin integrity.  4/20/2021 0739 by Gaby Jama R.N.  Flowsheets (Taken 4/20/2021 0739)  Addressed this shift: Remains free of injury from restraints (restraint for  interference with medical device):   Determine that other, less restrictive measures have been tried or would not be effective before applying the restraint   Evaluate the patient's condition at the time of restraint application   Inform patient/family regarding the reason for restraint   Every 2 hours: Monitor safety, psychosocial status, comfort, nutrition and hydration  Note: Educated pt on safety related to restraints. Q 2hrs evaluation of skin integrity.

## 2021-04-20 NOTE — THERAPY
Missed Therapy     Patient Name: Irving Ivory  Age:  40 y.o., Sex:  male  Medical Record #: [id number]  Today's Date: 4/20/2021    Discussed missed therapy with RN       04/20/21 8878   Treatment Variance   Reason For Missed Therapy Medical - Patient Is Not Medically Stable   Interdisciplinary Plan of Care Collaboration   Collaboration Comments Orders received and acknowledged for a clinical swallow evaluation.  Patient was extubated this morning.  He pulled his Cortrak and is currently NPO with no source of nutrition.  Discussed case with RN who stated the patient is not appropriate due to lethargy and unstable respiratory status.  SLP will hold today and attempt as the patient is appropriate.

## 2021-04-20 NOTE — CARE PLAN
Problem: Safety - Medical Restraint  Goal: Remains free of injury from restraints (Restraint for Interference with Medical Device)  Description: INTERVENTIONS:  1. Determine that other, less restrictive measures have been tried or would not be effective before applying the restraint  2. Evaluate the patient's condition at the time of restraint application  3. Inform patient/family regarding the reason for restraint  4. Q2H: Monitor safety, psychosocial status, comfort, nutrition and hydration  Flowsheets (Taken 4/20/2021 1876)  Addressed this shift: Remains free of injury from restraints (restraint for interference with medical device):   Determine that other, less restrictive measures have been tried or would not be effective before applying the restraint   Evaluate the patient's condition at the time of restraint application   Inform patient/family regarding the reason for restraint   Every 2 hours: Monitor safety, psychosocial status, comfort, nutrition and hydration  Note: Educated pt on safety related to restraints. Q 2hrs evaluation of skin integrity.

## 2021-04-21 NOTE — PROGRESS NOTES
Cortrak Placement    Tube Team verified patient name and medical record number prior to tube placement.  Cortrak tube (43 inches, 10 Tongan) placed at 70 cm in right nare.  Per Cortrak picture, tube appears to be in the stomach.  Nursing Instructions: Awaiting KUB to confirm placement before use for medications or feeding. Once placement confirmed, flush tube with 30 ml of water, and then remove and save stylet, in patient medication drawer.

## 2021-04-21 NOTE — CARE PLAN
Problem: Nutritional:  Goal: Nutrition support tolerated and meeting greater than 85% of estimated needs  Outcome: MET     TF @ goal.

## 2021-04-21 NOTE — PROGRESS NOTES
Critical Care Progress Note    Date of admission  4/17/2021    Chief Complaint  40 y.o. male admitted 4/17/2021 with respiratory failure, encephalopathy    Hospital Course  40-year-old male with BMI 39 transferred from [facility] where he was admitted several days ago prior to admission for pancreatitis and alcohol abuse subsequently going into alcohol withdrawal syndrome.  He required multiple doses of Ativan and had a CIWA score of 25, worsening AMS, intubated and transferred here for further management. Post intubation, pt became hypotensive, started on NE gtt. Lipase 800, CT A/P with no evidence of complications of pancreatitis.     4/17 intubated at OSH  4/18 s/p bronched due to increased secretions.   4/19 extubated      Interval Problem Update  Reviewed last 24 hour events:  No acute events overnight  Extubated, on 10-12L oxymask sat >95%  Delirious, +CAM ICU, on precedex gtt.   Pulled cortrak, cortrak reinserted. Tube feed at goal.   Speech therapy   Febrile at 38.3C  MAP >65, HR in 80s. On NE at 2mcg/min  WBC 17K, Plt 347K  Creatnine 0.50.   I/O +284cc in the past 24 hours, -4.7L since admisison  HCO3 38  Total bili 12-13, INR 1.46  On augmentin.   On prednisolone      Review of Systems  Review of Systems   Unable to perform ROS: Intubated        Vital Signs for last 24 hours   Temp:  [37.6 °C (99.7 °F)-38 °C (100.4 °F)] 38 °C (100.4 °F)  Pulse:  [] 83  Resp:  [13-32] 31  BP: ()/(41-81) 104/59  SpO2:  [86 %-98 %] 98 %    Hemodynamic parameters for last 24 hours  CVP:  [-1 MM HG-300 MM HG] 294 MM HG    Respiratory Information for the last 24 hours       Physical Exam   Physical Exam  Vitals and nursing note reviewed.   Constitutional:       General: He is not in acute distress.     Appearance: He is well-developed. He is ill-appearing.   HENT:      Head: Normocephalic.      Nose:      Comments: Cortrak in place     Mouth/Throat:      Mouth: Mucous membranes are dry.   Neck:       Vascular: No JVD.      Trachea: No tracheal deviation.   Cardiovascular:      Rate and Rhythm: Normal rate and regular rhythm.      Pulses: Normal pulses.   Pulmonary:      Breath sounds: Rales present. No wheezing.   Abdominal:      General: There is distension.      Palpations: Abdomen is soft.      Tenderness: There is abdominal tenderness. There is no guarding.   Musculoskeletal:         General: No tenderness.      Cervical back: Neck supple.      Right lower leg: Edema present.      Left lower leg: Edema present.   Skin:     General: Skin is warm and dry.      Capillary Refill: Capillary refill takes less than 2 seconds.      Findings: No rash.   Neurological:      General: No focal deficit present.      Mental Status: He is alert.      Cranial Nerves: No cranial nerve deficit.      Sensory: No sensory deficit.      Motor: No weakness.      Comments: Opens eyes to deep painful stimuli.  Localizing to pain       I reevaluated the patient after extubation. Pt appears comfortable though CAM ICU negative.     Medications  Current Facility-Administered Medications   Medication Dose Route Frequency Provider Last Rate Last Admin   • dexmedetomidine (PRECEDEX) 400 mcg/100mL NS premix infusion  0.1-1.5 mcg/kg/hr Intravenous Continuous Miky Gama D.O. 12.5 mL/hr at 04/21/21 0535 0.4 mcg/kg/hr at 04/21/21 0535   • potassium bicarbonate (KLYTE) effervescent tablet 50 mEq  50 mEq Enteral Tube BID Miky Gama D.O.   50 mEq at 04/21/21 0507   • albuterol (PROVENTIL) 2.5mg/0.5ml nebulizer solution 2.5 mg  2.5 mg Nebulization Q4H PRN (RT) Evin Hammer M.D.   2.5 mg at 04/20/21 0758   • lactulose 20 GM/30ML solution 30 mL  30 mL Enteral Tube TID Miky Gama D.O.   30 mL at 04/21/21 0507   • amoxicillin-clavulanate (AUGMENTIN) 875-125 MG per tablet 1 tablet  1 tablet Enteral Tube Q12HRS Terrance Pierce Jr., D.O.   1 tablet at 04/21/21 0507   • Respiratory Therapy Consult   Nebulization Continuous RT Jeremy M Gonda, M.D.        • MD Alert...ICU Electrolyte Replacement per Pharmacy   Other PHARMACY TO DOSE Jeremy M Gonda, M.D.       • Pharmacy Consult: Enteral tube insertion - review meds/change route/product selection  1 Each Other PHARMACY TO DOSE Jeremy M Gonda, M.D.       • norepinephrine (Levophed) infusion 8 mg/250 mL (premix)  0-30 mcg/min Intravenous Continuous Jeremy M Gonda, M.D. 3.8 mL/hr at 04/21/21 0716 2 mcg/min at 04/21/21 0716   • thiamine (Vitamin B-1) tablet 100 mg  100 mg Enteral Tube DAILY Radha Beckett M.D.   100 mg at 04/21/21 0508   • folic acid (FOLVITE) tablet 1 mg  1 mg Enteral Tube DAILY Radha Beckett M.D.   1 mg at 04/21/21 0507   • prednisoLONE (PRELONE) 15 MG/5ML syrup 39.9 mg  39.9 mg Enteral Tube DAILY Jeremy M Gonda, M.D.   39.9 mg at 04/20/21 1938   • enoxaparin (LOVENOX) inj 40 mg  40 mg Subcutaneous Q12HRS Jeremy M Gonda, M.D.   40 mg at 04/21/21 0507   • furosemide (LASIX) injection 80 mg  80 mg Intravenous Q12HRS Jeremy M Gonda, M.D.   80 mg at 04/21/21 0508       Fluids    Intake/Output Summary (Last 24 hours) at 4/21/2021 0725  Last data filed at 4/21/2021 0600  Gross per 24 hour   Intake 3631.18 ml   Output 3735 ml   Net -103.82 ml       Laboratory  Recent Labs     04/18/21  2340 04/19/21  0247 04/20/21  0330   ISTATAPH 7.490 7.493 7.554*   ISTATAPCO2 46.9* 49.6* 42.3*   ISTATAPO2 171* 213* 67   ISTATATCO2 37* 39* 39*   SDCZKHF1QLC 100* 100* 95   ISTATARTHCO3 35.8* 38.0* 37.4*   ISTATARTBE 11* 13* 14*   ISTATTEMP 38.1 C 37.4 C 37.6 C   ISTATFIO2 100 100 50   ISTATSPEC Arterial Arterial Arterial   ISTATAPHTC 7.473 7.487 7.544*   GJWRVBPR4MX 177* 215* 70     Recent Labs     04/18/21  1145   CPKTOTAL 65     Recent Labs     04/19/21  0445 04/20/21  0310 04/21/21  0250   SODIUM 133* 133* 136   POTASSIUM 3.3* 3.1* 3.6   CHLORIDE 91* 90* 92*   CO2 34* 32 38*   BUN 17 17 19   CREATININE 0.48* 0.53 0.50   MAGNESIUM 2.1 2.1  --    PHOSPHORUS 2.4* 2.4* 3.4   CALCIUM 8.0* 8.3* 8.5     Recent  Labs     04/19/21 0445 04/20/21  0310 04/21/21  0250   ALTSGPT 23 24 33   ASTSGOT 141* 145* 178*   ALKPHOSPHAT 188* 172* 171*   TBILIRUBIN 12.6* 12.3* 13.5*   GLUCOSE 162* 173* 194*     Recent Labs     04/19/21 0445 04/20/21  0310 04/21/21  0250   WBC 21.3* 16.4* 17.2*   NEUTSPOLYS 68.60 86.30* 85.40*   LYMPHOCYTES 11.00* 1.70* 5.90*   MONOCYTES 4.20 2.60 4.60   EOSINOPHILS 0.90 0.00 0.00   BASOPHILS 0.00 0.00 0.30   ASTSGOT 141* 145* 178*   ALTSGPT 23 24 33   ALKPHOSPHAT 188* 172* 171*   TBILIRUBIN 12.6* 12.3* 13.5*     Recent Labs     04/19/21 0445 04/20/21 0310 04/20/21  0928 04/21/21  0250   RBC 2.33* 2.43*  --  2.74*   HEMOGLOBIN 7.9* 8.2*  --  9.1*   HEMATOCRIT 23.0* 24.3*  --  28.8*   PLATELETCT 405 375  --  347   PROTHROMBTM  --   --  18.2*  --    INR  --   --  1.46*  --        Imaging  X-Ray:  I have personally reviewed the images and compared with prior images.    Assessment/Plan  Septic shock (HCC)- (present on admission)  Assessment & Plan  This is Septic shock Present on admission  SIRS criteria identified on my evaluation include: Tachycardia, with heart rate greater than 90 BPM, Tachypnea, with respirations greater than 20 per minute and Leukocytosis, with WBC greater than 12,000  Source is pulmonary vs SIRS from pancreatitis  Suspected onset of infection (date and time) on arrival  Presentation includes: Severe sepsis present and persistent hypotension after 30 ml/kg completed.   Despite appropriate fluid resuscitation with crystalloid given per sepsis guidelines, the patient remains hypotensive with systolic blood pressure less than 90 or MAP less than 65  Hemodynamic support with additional fluids and IV vasopressors as needed to maintain a SBP of 90 or MAP of 65  IV antibiotics as appropriate for source of sepsis  Reassessment: I have reassessed the patient's hemodynamic status      Acute respiratory failure with hypoxia (HCC)- (present on admission)  Assessment & Plan  Intubated at outside  hospital for aspiration and hypoxia  4/18 s/p bronch due to increased secretions.   Extubated 4/20    Plan:   On Oxymask 10-12L post extubation.   Monitor closely for reintubation  Increase lasix to 40 Q8H and add diamox due to increasing metabolic alkalosis.   Replete electrolytes to keep K >4 and Mg >2  Very minimal ascites, no need for paracentesis.   Goal sat >88%, paO2 >60  Early mobility  Continue TF at goal rate  PT/OT, goal level 2-3 today.     Alcohol withdrawal (HCC)- (present on admission)  Assessment & Plan  Patient has history of alcohol abuse since the age of 16 as per the documentation  Hx of large alcohol abuse, 12 packs of beers/day and 1pint of alcohol daily, last drink a day of admission.   Intubated at OSH due to acute resp failure due to aspiration  Extubated 4/20     Plan:   Precedex gtt, wean off if able.   Vitamins, thiamine, folate  Keep K >4 and Mg >2  High risk for seizures  Non pharmacological therapy for delirium  PT/OT to eval and treat    Alcoholic hepatitis- (present on admission)  Assessment & Plan  AST/ALT is 119/18 and T.Bili of 16.5  Likely due to alcohol  USG ordered to evaluate for obstruction. Might need MRCP if positive  Maddrey's DS is 39, will continue prednisone  MELD score is 29, indicating 19.6% 3 month mortality  Continue lactulose 30 TID, titrate to -500cc output.      Hyponatremia- (present on admission)  Assessment & Plan  Sodium level was 117-121 at the outside hospital  Likely due to his alcoholic cirrhosis.   Improved. Monitor    Aspiration pneumonitis (HCC)  Assessment & Plan  Unclear, as per the EMS person, patient aspirated and was emergently intubated  Pro calcitonin elevated at 1.49, chest x ray is normal   Unclear if it is pneumonia or pneumonitis, continue Unasyn as procalcitonin is elevated  On augmentin to finish 5 day course      Pancreatitis- (present on admission)  Assessment & Plan  Questionable ?lipase at the outside hospital was 800, however  CT abdomen showed no signs of acute pancreatitis   Possibly triggered by alcohol withdrawal  Repeat lipase here was 412    Goals of care, counseling/discussion  Assessment & Plan  4/19 discussed with Bryon, brother and sister in law on the phone and updated pt's critical condition. Mother is coming tonight. All questions were answered  4/20 mother was at bedside and I updated her on patient's condition. All questions were answered    Hypophosphatemia- (present on admission)  Assessment & Plan  Severe  Continue to agressively replete    Elevated bilirubin- (present on admission)  Assessment & Plan  Monitor      Alcohol abuse- (present on admission)  Assessment & Plan  Since the age of 16 years as per the documentation  Pt is actively drinking. 12packs of beer daily and a pint of hard liquor daily.    Last drink was before admission   Needs alcohol cessation counseling and resources on discharge       VTE:  Lovenox  Ulcer: H2 Antagonist  Lines: Central Line  Ongoing indication addressed and Sotomayor Catheter  Ongoing indication addressed    I have performed a physical exam and reviewed and updated ROS and Plan today (4/21/2021). In review of yesterday's note (4/20/2021), there are no changes except as documented above.     Discussed patient condition and risk of morbidity and/or mortality with RN, RT, Pharmacy, Charge nurse / hot rounds and Patient     The patient remains critically ill.  Critical care time = 38 minutes in directly providing and coordinating critical care and extensive data review.  No time overlap and excludes procedures.    Addendum:  Discussed with mother who's at bedside about patient's critical condition. All questions were answered.   Additional time = ~10 mins.     Miky Gama D.O.

## 2021-04-21 NOTE — PROGRESS NOTES
2 RN skin check complete.   Devices in place ETT, BP cuff, pulseox, Cortrak, CVC, SCD's,, PIV x 2.  Skin assessed under devices - checked.  Confirmed pressure ulcers found on none.  New potential pressure ulcers noted on left hand due to coban wrapped around - PTA.   Feet, heels, ankles - dry, cracking, callused.   Bruising on trunk by the armpits B/L.   Sacrum red but blanching.   Piercing on the left nipple.   Wound consult placed. Photos taken.  The following interventions in place - heels floated, pillows under bony prominences, [id number].

## 2021-04-21 NOTE — DISCHARGE PLANNING
Care Transition Team Discharge Planning    Anticipated Discharge Disposition: TBD    Action: Per AM rounds, A/O 4, on 10 LPM oxy mask, and sedation has been titrated down. Pt's agitation is a little better as he becomes more oriented. Pt's tube feeding is at goal. SLP will assess pt. Pt has mobilized to chair at bedside today.    Barriers to Discharge: medical clearance/stability    Plan: Lsw will continue to follow, attend rounds for medical updates, and assist w/ d/c planning.

## 2021-04-21 NOTE — PROGRESS NOTES
Cortrak Placement    Tube Team verified patient name and medical record number prior to tube placement.  Cortrak tube (43 inches, 10 Spanish) placed at 75 cm in right nare.  Per Cortrak picture, tube appears to be in the small bowel.  Nursing Instructions: Awaiting KUB to confirm placement before use for medications or feeding. Once placement confirmed, flush tube with 30 ml of water, and then remove and save stylet, in patient medication drawer.

## 2021-04-21 NOTE — CARE PLAN
Problem: Safety  Goal: Will remain free from injury  Outcome: PROGRESSING AS EXPECTED  Goal: Will remain free from falls  Outcome: PROGRESSING AS EXPECTED     Problem: Venous Thromboembolism (VTW)/Deep Vein Thrombosis (DVT) Prevention:  Goal: Patient will participate in Venous Thrombosis (VTE)/Deep Vein Thrombosis (DVT)Prevention Measures  Outcome: PROGRESSING AS EXPECTED     Problem: Skin Integrity  Goal: Risk for impaired skin integrity will decrease  Outcome: PROGRESSING AS EXPECTED     Problem: Pain Management  Goal: Pain level will decrease to patient's comfort goal  Outcome: PROGRESSING AS EXPECTED     Problem: Bowel/Gastric:  Goal: Normal bowel function is maintained or improved  Outcome: PROGRESSING SLOWER THAN EXPECTED  Goal: Will not experience complications related to bowel motility  Outcome: PROGRESSING SLOWER THAN EXPECTED     Problem: Respiratory:  Goal: Respiratory status will improve  Outcome: PROGRESSING SLOWER THAN EXPECTED

## 2021-04-21 NOTE — THERAPY
"Speech Language Pathology   Clinical Swallow Evaluation     Patient Name: Irving Ivory  AGE:  40 y.o., SEX:  male  Medical Record #: [id number]  Today's Date: 4/21/2021     Precautions  Precautions: (P) Fall Risk, Swallow Precautions ( See Comments), Nasogastric Tube    Assessment    \"40-year-old male with BMI 39 transferred from [facility] where he was admitted several days ago prior to admission for pancreatitis and alcohol abuse subsequently going into alcohol withdrawal syndrome.\" Per CXR., \"Worsening bilateral interstitial and airspace opacification likely represent worsening interstitial edema. Pneumonia cannot be excluded.\" Patient intubated 4/17 and extubated 4/19. Patient has pulled Cortrak out multiple times.     Patient seen on this date for bedside clinical swallow evaluation. Patient presented as lethargic, required cues throughout the session to keep eyes open. Vocal quality noted as aphonic. Patient alert and oriented x4. OME completed revealed within functional limits for all tasks completed. Poor quality teeth observed, patient reported he does not wear dentures. PO trials consumed: ice chips, thins via spoon/cup, mildly thickened via spoon, and puree. Patient presented with weak laryngeal elevation upon palpation, timely initiation of swallow trigger, adequate mastication and bolus manipulation. Possible s/sx of penetration/aspiration noted for all consistencies consumed as evidenced by coughing. Patient observed to become more lethargic as the session progressed. Clinician cannot recommend a safe diet at this time. Clinician discussed results and recommendations with patient in which he demonstrated little understanding by asking for \"more water and apple sauce\". Patient may benefit from a diagnostic evaluation when he is appropriate.     Recommend: NPO with tube feed. Medications via gastric tube. SLP following.     Plan    Recommend Speech Therapy 3 times per week until " "therapy goals are met for the following treatments:  Dysphagia Training.    Discharge Recommendations: (P) Recommend post-acute placement for additional speech therapy services prior to discharge home    Subjective    Patient lethargic, but pleasant.      Objective       04/21/21 0835   Oral Motor Eval    Is Patient Able to Complete Oral Motor Eval Yes, Within Normal Limits   Laryngeal Function   Voice Quality Severe   Volutional Cough Minimal   Excursion Upon Swallow Weak    Oral Food Presentation   Ice Chips Severe   Single Swallow Mildly Thick (2) - (Nectar Thick)  Moderate   Single Swallow Thin (0) Severe   Pureed (4) Moderate   Self Feeding Needs Total Assistance   Tracheostomy   Tracheostomy  No   Dysphagia Strategies / Recommendations   Strategies / Interventions Recommended (Yes / No) Yes   Compensatory Strategies To Be Assessed   Diet / Liquid Recommendation NPO;Pre-Feeding Trials with SLP Only   Medication Administration  Via Gastric Tube   Therapy Interventions To Be Assessed   Follow Up SLP Evaluation May benefit from a FEES   Dysphagia Rating   Nutritional Liquid Intake Rating Scale Nothing by mouth   Nutritional Food Intake Rating Scale Nothing by mouth   Patient / Family Goals   Patient / Family Goal #1 \"Water\"   Short Term Goals   Short Term Goal # 1 Patient will consume pre-feeding trials with SLP with no overt s/sx of aspiration.         "

## 2021-04-22 NOTE — PROGRESS NOTES
Critical Care Progress Note    Date of admission  4/17/2021    Chief Complaint  40 y.o. male admitted 4/17/2021 with respiratory failure, encephalopathy    Hospital Course  40-year-old male with BMI 39 transferred from [facility] where he was admitted several days ago prior to admission for pancreatitis and alcohol abuse subsequently going into alcohol withdrawal syndrome.  He required multiple doses of Ativan and had a CIWA score of 25, worsening AMS, intubated and transferred here for further management. Post intubation, pt became hypotensive, started on NE gtt. Lipase 800, CT A/P with no evidence of complications of pancreatitis.     4/17 intubated at OSH  4/18 s/p bronched due to increased secretions.   4/19 extubated      Interval Problem Update  Reviewed last 24 hour events:  No acute events overnight  Delirium is improving  Sitting in chair today  MAP >65, HR in 80s.   Off pressor.   Creatnine 0.39  I/O +284cc in the past 24 hours, -4.7L since admisison  HCO3 38  Total bili 13.9, INR 1.46  Completed 5 day course of augmentin  On prednisolone    Review of Systems  Review of Systems   Unable to perform ROS: Intubated        Vital Signs for last 24 hours   Pulse:  [] 91  Resp:  [15-31] 18  BP: ()/(45-73) 103/60  SpO2:  [93 %-100 %] 97 %    Hemodynamic parameters for last 24 hours  CVP:  [278 MM HG-298 MM HG] 283 MM HG    Respiratory Information for the last 24 hours       Physical Exam   Physical Exam  Vitals and nursing note reviewed.   Constitutional:       General: He is not in acute distress.     Appearance: He is well-developed. He is ill-appearing.      Comments: Alert, sitting in chair  Mother at bedside   HENT:      Head: Normocephalic.      Nose:      Comments: Cortrak in place     Mouth/Throat:      Mouth: Mucous membranes are dry.   Neck:      Vascular: No JVD.      Trachea: No tracheal deviation.   Cardiovascular:      Rate and Rhythm: Normal rate and regular rhythm.       Pulses: Normal pulses.   Pulmonary:      Breath sounds: Rales present. No wheezing.      Comments: Diminished at bases  Abdominal:      General: There is distension.      Palpations: Abdomen is soft.      Tenderness: There is no abdominal tenderness. There is no guarding.   Musculoskeletal:         General: No tenderness.      Right lower leg: Edema present.      Left lower leg: Edema present.   Skin:     General: Skin is warm and dry.      Capillary Refill: Capillary refill takes less than 2 seconds.      Findings: No rash.   Neurological:      General: No focal deficit present.      Mental Status: He is alert.      Cranial Nerves: No cranial nerve deficit.      Sensory: No sensory deficit.      Motor: No weakness.       I reevaluated the patient after extubation. Pt appears comfortable though CAM ICU negative.     Medications  Current Facility-Administered Medications   Medication Dose Route Frequency Provider Last Rate Last Admin   • furosemide (LASIX) injection 40 mg  40 mg Intravenous Q8HRS Miky Gama, D.O.   40 mg at 04/22/21 0509   • acetaZOLAMIDE (DIAMOX) tablet 250 mg  250 mg Oral BID DIURETIC Miky Gama D.O.   250 mg at 04/22/21 0509   • potassium bicarbonate (KLYTE) effervescent tablet 50 mEq  50 mEq Enteral Tube TID Miky Gama D.O.   50 mEq at 04/22/21 0509   • dexmedetomidine (PRECEDEX) 400 mcg/100mL NS premix infusion  0.1-1.5 mcg/kg/hr Intravenous Continuous Miky Gama D.O. 9.4 mL/hr at 04/22/21 0330 0.3 mcg/kg/hr at 04/22/21 0330   • albuterol (PROVENTIL) 2.5mg/0.5ml nebulizer solution 2.5 mg  2.5 mg Nebulization Q4H PRN (RT) Evin Hammer M.D.   2.5 mg at 04/20/21 0758   • lactulose 20 GM/30ML solution 30 mL  30 mL Enteral Tube TID Miky Gama D.O.   30 mL at 04/22/21 0508   • Respiratory Therapy Consult   Nebulization Continuous RT Jeremy M Gonda, M.D.       • MD Alert...ICU Electrolyte Replacement per Pharmacy   Other PHARMACY TO DOSE Jeremy M Gonda, M.D.       • Pharmacy Consult: Enteral  tube insertion - review meds/change route/product selection  1 Each Other PHARMACY TO DOSE Jeremy M Gonda, M.D.       • norepinephrine (Levophed) infusion 8 mg/250 mL (premix)  0-30 mcg/min Intravenous Continuous Jeremy M Gonda, M.D.   Stopped at 04/22/21 0339   • thiamine (Vitamin B-1) tablet 100 mg  100 mg Enteral Tube DAILY Radha Beckett M.D.   100 mg at 04/22/21 0508   • folic acid (FOLVITE) tablet 1 mg  1 mg Enteral Tube DAILY Radha Beckett M.D.   1 mg at 04/22/21 0508   • prednisoLONE (PRELONE) 15 MG/5ML syrup 39.9 mg  39.9 mg Enteral Tube DAILY Jeremy M Gonda, M.D.   39.9 mg at 04/21/21 2115   • enoxaparin (LOVENOX) inj 40 mg  40 mg Subcutaneous Q12HRS Jeremy M Gonda, M.D.   40 mg at 04/22/21 0509       Fluids    Intake/Output Summary (Last 24 hours) at 4/22/2021 0743  Last data filed at 4/22/2021 0600  Gross per 24 hour   Intake 2397.42 ml   Output 5100 ml   Net -2702.58 ml       Laboratory  Recent Labs     04/20/21  0330   ISTATAPH 7.554*   ISTATAPCO2 42.3*   ISTATAPO2 67   ISTATATCO2 39*   EWBGEYR4WLY 95   ISTATARTHCO3 37.4*   ISTATARTBE 14*   ISTATTEMP 37.6 C   ISTATFIO2 50   ISTATSPEC Arterial   ISTATAPHTC 7.544*   [id number] 70         Recent Labs     04/20/21  0310 04/21/21  0250   SODIUM 133* 136   POTASSIUM 3.1* 3.6   CHLORIDE 90* 92*   CO2 32 38*   BUN 17 19   CREATININE 0.53 0.50   MAGNESIUM 2.1  --    PHOSPHORUS 2.4* 3.4   CALCIUM 8.3* 8.5     Recent Labs     04/20/21  0310 04/21/21  0250   ALTSGPT 24 33   ASTSGOT 145* 178*   ALKPHOSPHAT 172* 171*   TBILIRUBIN 12.3* 13.5*   GLUCOSE 173* 194*     Recent Labs     04/20/21  0310 04/21/21  0250 04/22/21  0425   WBC 16.4* 17.2* 15.3*   NEUTSPOLYS 86.30* 85.40* 87.70*   LYMPHOCYTES 1.70* 5.90* 6.00*   MONOCYTES 2.60 4.60 4.30   EOSINOPHILS 0.00 0.00 0.00   BASOPHILS 0.00 0.30 0.20   ASTSGOT 145* 178*  --    ALTSGPT 24 33  --    ALKPHOSPHAT 172* 171*  --    TBILIRUBIN 12.3* 13.5*  --      Recent Labs     04/20/21 0310 04/20/21  0928  04/21/21  0250 04/22/21  0425   RBC 2.43*  --  2.74* 2.64*   HEMOGLOBIN 8.2*  --  9.1* 9.1*   HEMATOCRIT 24.3*  --  28.8* 28.5*   PLATELETCT 375  --  347 363   PROTHROMBTM  --  18.2*  --   --    INR  --  1.46*  --   --        Imaging  X-Ray:  I have personally reviewed the images and compared with prior images.    Assessment/Plan  Septic shock (HCC)- (present on admission)  Assessment & Plan  This is Septic shock Present on admission  SIRS criteria identified on my evaluation include: Tachycardia, with heart rate greater than 90 BPM, Tachypnea, with respirations greater than 20 per minute and Leukocytosis, with WBC greater than 12,000  Source is pulmonary vs SIRS from pancreatitis  Suspected onset of infection (date and time) on arrival  Presentation includes: Severe sepsis present and persistent hypotension after 30 ml/kg completed.   Despite appropriate fluid resuscitation with crystalloid given per sepsis guidelines, the patient remains hypotensive with systolic blood pressure less than 90 or MAP less than 65  Hemodynamic support with additional fluids and IV vasopressors as needed to maintain a SBP of 90 or MAP of 65  IV antibiotics as appropriate for source of sepsis  Reassessment: I have reassessed the patient's hemodynamic status    Resolved      Acute respiratory failure with hypoxia (HCC)- (present on admission)  Assessment & Plan  Intubated at outside hospital for aspiration and hypoxia  4/18 s/p bronch due to increased secretions.   Extubated 4/20  Very minimal ascites, no need for paracentesis.     Plan:   On Oxymask 10-12L, wean down to 7L   Continue lasix to 40 Q8H and diamox due to increasing metabolic alkalosis.   Replete electrolytes to keep K >4 and Mg >2  Goal sat >88%, paO2 >60  Early mobility  Continue TF at goal rate  To have repeat swallow study today  PT/OT, goal level 2-3 today.     Alcohol withdrawal (HCC)- (present on admission)  Assessment & Plan  Patient has history of alcohol abuse  since the age of 16 as per the documentation  Hx of large alcohol abuse, 12 packs of beers/day and 1pint of alcohol daily, last drink a day of admission.   Intubated at OSH due to acute resp failure due to aspiration  Extubated 4/20     Plan:   Precedex gtt, wean off if able.   Vitamins, thiamine, folate  Keep K >4 and Mg >2  High risk for seizures  Non pharmacological therapy for delirium  PT/OT to eval and treat    Alcoholic hepatitis- (present on admission)  Assessment & Plan  AST/ALT is 119/18 and T.Bili of 16.5  Likely due to alcohol  USG ordered to evaluate for obstruction. Might need MRCP if positive  Maddrey's DS is 39, will continue prednisone  MELD score is 29, indicating 19.6% 3 month mortality  Continue lactulose 30 TID, titrate to -500cc output.      Hyponatremia- (present on admission)  Assessment & Plan  Sodium level was 117-121 at the outside hospital  Likely due to his alcoholic cirrhosis.   Improved. Monitor    Aspiration pneumonitis (HCC)  Assessment & Plan  Unclear, as per the EMS person, patient aspirated and was emergently intubated  Pro calcitonin elevated at 1.49, chest x ray is normal   Unclear if it is pneumonia or pneumonitis, continue Unasyn as procalcitonin is elevated  On augmentin to finish 5 day course      Pancreatitis- (present on admission)  Assessment & Plan  Questionable ?lipase at the outside hospital was 800, however CT abdomen showed no signs of acute pancreatitis   Possibly triggered by alcohol withdrawal  Repeat lipase here was 412    Goals of care, counseling/discussion  Assessment & Plan  4/19 discussed with Bryon, brother and sister in law on the phone and updated pt's critical condition. Mother is coming tonight. All questions were answered  4/20 mother was at bedside and I updated her on patient's condition. All questions were answered  4/22 mother was at bedside and I updated her on patient's condition. All questions were answered    Hypophosphatemia- (present  on admission)  Assessment & Plan  Severe  Continue to agressively replete    Elevated bilirubin- (present on admission)  Assessment & Plan  Monitor      Alcohol abuse- (present on admission)  Assessment & Plan  Since the age of 16 years as per the documentation  Pt is actively drinking. 12packs of beer daily and a pint of hard liquor daily.    Last drink was before admission   Needs alcohol cessation counseling and resources on discharge       VTE:  Lovenox  Ulcer: H2 Antagonist  Lines: Central Line  Ongoing indication addressed and Sotomayor Catheter  Ongoing indication addressed    I have performed a physical exam and reviewed and updated ROS and Plan today (4/22/2021). In review of yesterday's note (4/21/2021), there are no changes except as documented above.     Discussed patient condition and risk of morbidity and/or mortality with RN, RT, Pharmacy, Charge nurse / hot rounds and Patient     The patient remains critically ill.  Critical care time = 35 minutes in directly providing and coordinating critical care and extensive data review.  No time overlap and excludes procedures.

## 2021-04-22 NOTE — CARE PLAN
Problem: Communication  Goal: The ability to communicate needs accurately and effectively will improve  Outcome: PROGRESSING AS EXPECTED     Problem: Safety  Goal: Will remain free from injury  Outcome: PROGRESSING AS EXPECTED  Goal: Will remain free from falls  Outcome: PROGRESSING AS EXPECTED     Problem: Venous Thromboembolism (VTW)/Deep Vein Thrombosis (DVT) Prevention:  Goal: Patient will participate in Venous Thrombosis (VTE)/Deep Vein Thrombosis (DVT)Prevention Measures  Outcome: PROGRESSING AS EXPECTED     Problem: Knowledge Deficit  Goal: Knowledge of disease process/condition, treatment plan, diagnostic tests, and medications will improve  Outcome: PROGRESSING AS EXPECTED  Goal: Knowledge of the prescribed therapeutic regimen will improve  Outcome: PROGRESSING AS EXPECTED     Problem: Respiratory:  Goal: Respiratory status will improve  Outcome: PROGRESSING AS EXPECTED     Problem: Safety - Medical Restraint  Goal: Remains free of injury from restraints (Restraint for Interference with Medical Device)  Description: INTERVENTIONS:  1. Determine that other, less restrictive measures have been tried or would not be effective before applying the restraint  2. Evaluate the patient's condition at the time of restraint application  3. Inform patient/family regarding the reason for restraint  4. Q2H: Monitor safety, psychosocial status, comfort, nutrition and hydration  Outcome: PROGRESSING AS EXPECTED  Goal: Free from restraint(s) (Restraint for Interference with Medical Device)  Description: INTERVENTIONS:  1. ONCE/SHIFT or MINIMUM Q12H: Assess and document the continuing need for restraints  2. Q24H: Continued use of restraint requires LIP to perform face to face examination and written order  3. Identify and implement measures to help patient regain control  Outcome: PROGRESSING AS EXPECTED     Problem: Pain Management  Goal: Pain level will decrease to patient's comfort goal  Outcome: PROGRESSING AS  EXPECTED     Problem: Psychosocial Needs:  Goal: Level of anxiety will decrease  Outcome: PROGRESSING AS EXPECTED

## 2021-04-22 NOTE — THERAPY
Occupational Therapy   Initial Evaluation     Patient Name: Irving Ivory  Age:  40 y.o., Sex:  male  Medical Record #: [id number]  Today's Date: 4/22/2021     Precautions  Precautions: Fall Risk, Swallow Precautions ( See Comments)    Assessment  Patient is 40 y.o. male admitted for encephalopathy, septic shock, alcoholic hepatitis, aspiration pneumonitis, and respiratory failure, previously at [facility] for pancreatitis and ETOH withdrawal s/p bronchoscopy. PMhx of obesity and ETOH abuse. Mother present and supportive, but lives in [place]; unclear if brother can assist. Currently limited by decreased functional mobility, activity tolerance, cognition, coordination, balance, and pain which are currently affecting pt's ability to complete ADLs/IADLs at baseline. Will continue to follow.     Plan    Recommend Occupational Therapy 3 times per week until therapy goals are met for the following treatments:  Adaptive Equipment, Neuro Re-Education / Balance, Self Care/Activities of Daily Living, Therapeutic Activities and Therapeutic Exercises.    DC Equipment Recommendations: Unable to determine at this time, Tub / Shower Seat  Discharge Recommendations: Recommend post-acute placement for additional occupational therapy services prior to discharge home(may improve while in house)      Objective       04/22/21 0954   Prior Living Situation   Prior Services Home-Independent   Housing / Facility 2 Story Apartment / Condo   Steps Into Home   (2 flights of stairs)   Bathroom Set up Walk In Shower   Equipment Owned None   Lives with - Patient's Self Care Capacity Alone and Able to Care For Self   Comments mother lives in [place], but may have a friend; need clarification   Prior Level of ADL Function   Self Feeding Independent   Grooming / Hygiene Independent   Bathing Independent   Dressing Independent   Toileting Independent   Prior Level of IADL Function   Medication Management Independent   Laundry Independent    Kitchen Mobility Independent   Finances Independent   Home Management Independent   Shopping Independent   Prior Level Of Mobility Independent Without Device in Community;Independent Without Device in Home   Driving / Transportation Driving Independent   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)   Vitals   O2 Delivery Device Oxymask   Pain 0 - 10 Group   Therapist Pain Assessment Post Activity Pain Same as Prior to Activity;During Activity;Nurse Notified  (no c/o pain)   Cognition    Cognition / Consciousness X   Speech/ Communication Delayed Responses  (quiet voice)   Level of Consciousness Alert   Safety Awareness Impaired   Attention Impaired   Sequencing Impaired  (getting OOB)   Comments pleasant and cooperative; req extra time for activity   Passive ROM Upper Body   Passive ROM Upper Body WDL   Active ROM Upper Body   Active ROM Upper Body  WDL   Strength Upper Body   Upper Body Strength  WDL   Coordination Upper Body   Coordination X   Comments sightly impaired during ADLs   Balance Assessment   Sitting Balance (Static) Fair +   Sitting Balance (Dynamic) Fair   Standing Balance (Static) Poor +   Standing Balance (Dynamic) Poor +   Weight Shift Sitting Fair   Weight Shift Standing Poor   Comments w/ HHA x2    Bed Mobility    Supine to Sit Maximal Assist   Sit to Supine   (left in cardiac chair)   Scooting Minimal Assist   Comments HOB elevated use of rails   ADL Assessment   Grooming Supervision;Seated  (wiping face)   Lower Body Dressing Maximal Assist  (socks)   Toileting Total Assist  (BMS and curtis)   Functional Mobility   Sit to Stand Moderate Assist   Bed, Chair, Wheelchair Transfer Moderate Assist   Transfer Method Stand Step   Mobility w/ HHAx 2 from EOB to cardiac chair   ICU Target Mobility Level   ICU Mobility - Targeted Level Level 3B   Edema / Skin Assessment   Edema / Skin  Not Assessed   Comments BUE edema   Activity Tolerance   Sitting in Chair 15+ min left in  cardiac chair   Sitting Edge of Bed 25 min   Standing 1-2 min total   Comments limited by fatigue   Patient / Family Goals   Patient / Family Goal #1 to go home   Short Term Goals   Short Term Goal # 1 will complete BSC txf with SPV   Short Term Goal # 2 will complete standing g/h with min A   Short Term Goal # 3 will complete LB dressing with min A   Anticipated Discharge Equipment and Recommendations   DC Equipment Recommendations Unable to determine at this time;Tub / Shower Seat   Discharge Recommendations Recommend post-acute placement for additional occupational therapy services prior to discharge home  (may improve while in house)

## 2021-04-22 NOTE — THERAPY
Speech Language Pathology   Fiberoptic Endoscopic Evaluation of Swallow     Patient Name: Irving Ivory  AGE:  40 y.o., SEX:  male  Medical Record #: [id number]  Today's Date: 4/22/2021     Precautions  Precautions: Fall Risk, Swallow Precautions ( See Comments)    Assessment    Patient is 40 y.o. male with a diagnosis of alcohol withdrawal and possible pancreatitis.  Intubation following aspiration.  Additional factors influencing patient status/progress: septic shock, acute respiratory failure, hyponatremia, pneumonia.  PMH: ETOH.  Chest 4/20: Worsening bilateral interstitial and airspace opacification likely representing worsening interstitial edema.  Pneumonia cannot be exluded.   Patient seen for FEES diagnostic. Discussed with the patient the risks, benefits, and alternatives of the FEES procedure. Patient acknowledges and agreeable to proceed with the procedure.  Scope passed through the L nares and flat epiglottis noted, with structure positioned twd the posterior pharyngeal wall, and hitting the cortrak upon sluggish retroversion.  Vocal cords do not completely adduct for speech or swallow tasks, with risk of descending aspiration.  Pt with questionable blue-tinged color at the level of the vocal cords post swallow for mildly thick liquids.  Suspect deep penetration (silent) during the swallow.  Deep penetration also noted with trace amount of applesauce.  Suspect overt aspiration of thin liquids tsp bolus as evidenced by forceful coughing spell immediately following.  Pt is appropriate for NON-MEAL stim 1:1 with SLP only, for MT2/PU4 pre-feeding.  Ok for limited ice chips with sign and swallow sequence posted at head of bed.  Needs non-oral as primary source of nutrition.  Pt/family educated regarding voice tx to improve laryngeal adduction for speech and swallow.  Will need ongoing reinforcement.        Plan    Recommend Speech Therapy 3 times per week until therapy goals are met for the following  treatments:  Dysphagia Training and Patient / Family / Caregiver Education.    Discharge Recommendations: Recommend post-acute placement for additional speech therapy services prior to discharge home     Objective       04/22/21 1201   FEES Voice Assessment    Voice:   (aphonia)   FEES Sensation Assessment    Presence of Scope Inadequate   Presence of Residue Inadequate   Presence of Penetration Delayed Response   Presence of Aspiration Immediate Response   FEES Swallow Function Assessment   Swallow Trigger Impaired;Level of the Valleculae   Base of Tongue Retraction Impaired   Pharyngeal Constrictor Movement Impaired   Epiglottic Movement Impaired  (sluggish)   Laryngeal Elevation Impaired   Swallow Observations / Symptoms   Penetration Suspected During the Swallow Thin (0)   Thin (0) Teaspoon   Aspiration Suspected During the Swallow Thin (0)   Thin (0) Teaspoon   Compensatory Strategies Attempted   Compensatory Strategies Attempted Yes   Multiple Swallows aides in clearing mild residue   Liquid Wash After Swallow aides in clearing pc of fruit   Alternate Liquids and Solids aides in clearing fruit   Additional Comments epiglottic retroversion is sluggish, and hits the cortrak.   Patient Ability To Protect Airway   Patient Ability To Protect Airway  Inadequate   Penetration Aspiration Scale   Penetration Aspiration Scale 6 - Material passes glottis but is ejected from airway, no visible subglottic stasis   [place] Pharyngeal Residue Severity   Vallecular Residue Trace, trace coating   Pyriform Sinus Residue  Mild, up wall to ¼ full   Dysphagia Rating   Nutritional Liquid Intake Rating Scale Nothing by mouth   Nutritional Food Intake Rating Scale Tube dependent with minimal attempts of oral intake   Problem List   Problem List Dysphagia;Voice Quality Deficit   Evaluation Recommendations   Swallow Evaluations Recommendations (Yes / No) Yes   Diet / Liquid Recommendation NPO;Pre-Feeding Trials with SLP Only   Medication  Administration  Via Gastric Tube   Supplemental Feedings Tube Feeding   Recommended Supervision Direct   Evaluation Recommended Techniques   Swallow Techniques Yes   Techniques Oral Stage Lip Closure / Buccal / Jaw Exercises;Range Of Motion Exercises For Tongue  (needs oral motor exercises)   Techniques Pharyngeal Phase Repeat Swallow 2-3 Times On Each Bolus To Clear Residue;Effortful Swallow;Pacing:Control Amount Of Presentation;Pacing:Control Rate Of Presentation;Laryngeal Adduction Exercises To Improve Vocal Cord Closure   Short Term Goals   Short Term Goal # 1 Patient will consume pre-feeding trials with SLP with no overt s/sx of aspiration.  (try ice chips, mildly thick liquids, puree x 2)   Goal Outcome # 1 Progressing as expected   Short Term Goal # 2 Pt will voice at the sound level with mod max cues during completion of laryngeal adduction exercises   Education Group   Additional Comments Mom educated; sign posted regarding limited ice chips q 2-4 hrs   Interdisciplinary Plan of Care Collaboration   Collaboration Comments NPO, prefdg with SLP.  Ok for limited ice chips   Session Information   Date / Session Number 2, 4/22/21 (2/3-4/27)   Priority 3  (3x. ETOH/NPO/TF.FEES 4/22; npo,OME,prefdg/coughs w/ asp)

## 2021-04-22 NOTE — THERAPY
"Speech Language Pathology  Daily Treatment     Patient Name: Irving Ivory  Age:  40 y.o., Sex:  male  Medical Record #: [id number]  Today's Date: 4/22/2021     Precautions  Precautions: Fall Risk, Swallow Precautions ( See Comments)    Assessment    Requested to re-evaluate pt due to pt restlessness with cortrak in place.  Pt's mom at bedside and prefdg trials with timely, yet weak to palpation, for ice, puree and mildly thickened liquid.  No overt s/s of aspiration, however, cannot make a diet determination given pt with poor voicing at the sound level, and weak swallow to palpation.  FEES orders in place; will proceed to diagnostic.     Plan    Continue current treatment plan.  Continue NPO, FEES to follow.    Discharge Recommendations: (P) Recommend post-acute placement for additional speech therapy services prior to discharge home     Objective       04/22/21 1123   Voice   Comments aphonic   Dysphagia    Dysphagia X   Diet / Liquid Recommendation To Be Assessed;Pre-Feeding Trials with SLP Only   Nutritional Liquid Intake Rating Scale Nothing by mouth   Nutritional Food Intake Rating Scale Nothing by mouth   Skilled Intervention Verbal Cueing;Tactile Cueing;Compensatory Strategies   Recommended Route of Medication Administration   Medication Administration  Via Gastric Tube   Other Treatments   Other Treatments Provided Family education regarding POC   Patient / Family Goals   Patient / Family Goal #1 \"Water\"   Goal #1 Outcome Goal not met   Short Term Goals   Short Term Goal # 1 Patient will consume pre-feeding trials with SLP with no overt s/sx of aspiration.   Goal Outcome # 1 Progressing as expected   Interdisciplinary Plan of Care Collaboration   Collaboration Comments FEES to be scheduled   Session Information   Date / Session Number 2, 4/22/21 (2/3-4/27)   Priority 3  (3x.  ETOH/NPO/Tfdg. Has orders for FEES; to be done 4/22)         "

## 2021-04-22 NOTE — THERAPY
"Physical Therapy   Initial Evaluation     Patient Name: Irving Ivory  Age:  40 y.o., Sex:  male  Medical Record #: [id number]  Today's Date: 4/22/2021     Precautions: Fall Risk, Swallow Precautions ( See Comments)    Assessment  Patient is 40 y.o. male with a diagnosis of ETOH withdrawal presents with impaired strength, balance and activity tolerance. He is indep at baseline and lives alone with minimal support. He was very motivated to work with therapy today and was limited by fatigue. He was able to get from EOB to chair with 2 person HHA. Will benefit from placement fir further therapy.  Will continue to follow for further skilled PT while in house.     Plan    Recommend Physical Therapy 4 times per week until therapy goals are met for the following treatments:  Bed Mobility, Gait Training, Neuro Re-Education / Balance, Stair Training, Therapeutic Activities and Therapeutic Exercises    DC Equipment Recommendations: Unable to determine at this time  Discharge Recommendations: Recommend post-acute placement for additional physical therapy services prior to discharge home       Subjective    \"I want orange juice:     Objective       04/22/21 1000   Precautions   Precautions Fall Risk;Swallow Precautions ( See Comments);Nasogastric Tube   Comments ETOH withdrawal    Vitals   Pulse 85   Blood Pressure 103/56   Respiration 18   Pulse Oximetry 97 %   Pain   Intervention [place];Rest;Repositioned   Pain 0 - 10 Group   Location Generalized   Pain Rating Scale (NPRS) 0   Comfort Goal 0   Prior Living Situation   Prior Services Home-Independent   Housing / Facility 2 Story Apartment / Condo   Steps Into Home 24   Bathroom Set up Walk In Shower   Equipment Owned None   Lives with - Patient's Self Care Capacity Alone and Able to Care For Self   Comments patient's mother is visiting from [place], patient does not identify any other support    Prior Level of Functional Mobility   Bed Mobility Independent   Transfer " Status Independent   Ambulation Independent   Distance Ambulation (Feet)   (community distances )   Assistive Devices Used None   Stairs Independent   History of Falls   History of Falls No   Cognition    Cognition / Consciousness X   Speech/ Communication Delayed Responses  (hoarse voice with low volume )   Level of Consciousness Lethargic   Safety Awareness Impaired   Attention Impaired   Sequencing Impaired   Comments pleasant and cooperative    Active ROM Lower Body    Active ROM Lower Body  WDL   Strength Lower Body   Lower Body Strength  WDL   Sensation Lower Body   Lower Extremity Sensation   WDL   Strength Upper Body   Upper Body Strength  WDL   Coordination Upper Body   Coordination X   Vision   Vision Comments pt c/o blurred vision and irritated eyes    Balance Assessment   Sitting Balance (Static) Fair +   Sitting Balance (Dynamic) Fair   Standing Balance (Static) Poor +   Standing Balance (Dynamic) Poor +   Weight Shift Sitting Fair   Weight Shift Standing Poor   Comments w/HHA x2   Gait Analysis   Gait Level Of Assist Unable to Participate   Bed Mobility    Supine to Sit Maximal Assist   Sit to Supine   (left in cardiac chair )   Scooting Minimal Assist   Functional Mobility   Sit to Stand Moderate Assist   Bed, Chair, Wheelchair Transfer Moderate Assist   Transfer Method Stand Step   Mobility w/HHA x2 people    How much difficulty does the patient currently have...   Turning over in bed (including adjusting bedclothes, sheets and blankets)? 2   Sitting down on and standing up from a chair with arms (e.g., wheelchair, bedside commode, etc.) 2   Moving from lying on back to sitting on the side of the bed? 2   How much help from another person does the patient currently need...   Moving to and from a bed to a chair (including a wheelchair)? 2   Need to walk in a hospital room? 2   Climbing 3-5 steps with a railing? 2   6 clicks Mobility Score 12   Activity Tolerance   Sitting in Chair post session     Sitting Edge of Bed 25 min   Standing 2 min    Edema / Skin Assessment   Comments B UE edema    Patient / Family Goals    Patient / Family Goal #1 to go home    Short Term Goals    Short Term Goal # 1 in 6 visits patient will demo all bed mobility indep for safe DC home    Short Term Goal # 2 in 6 visits patient will demo all functional transfers Ethel for safe DC home    Short Term Goal # 3 in 6 visits patient will ambualte 200' Ethel for safe DC home    Short Term Goal # 4 in 6 visits patient will navigate 24 stairs Ethel for safe DC home    Education Group   Education Provided Role of Physical Therapist;Gait Training   Role of Physical Therapist Patient Response Patient;Acceptance;Explanation;Demonstration;Verbal Demonstration   Gait Training Patient Response Patient;Acceptance;Explanation;Demonstration;Verbal Demonstration   Problem List    Problems Pain;Impaired Bed Mobility;Impaired Transfers;Impaired Ambulation;Impaired Balance;Decreased Activity Tolerance;Safety Awareness Deficits / Cognition;Impaired Coordination   Anticipated Discharge Equipment and Recommendations   DC Equipment Recommendations Unable to determine at this time   Discharge Recommendations Recommend post-acute placement for additional physical therapy services prior to discharge home     Naila Dyer, PT, DPT, GCS

## 2021-04-23 PROBLEM — R50.9 FEVER: Status: ACTIVE | Noted: 2021-01-01

## 2021-04-23 NOTE — PROGRESS NOTES
Critical Care Progress Note    Date of admission  4/17/2021    Chief Complaint  40 y.o. male admitted 4/17/2021 with respiratory failure, encephalopathy    Hospital Course  40-year-old male with BMI 39 transferred from [facility] where he was admitted several days ago prior to admission for pancreatitis and alcohol abuse subsequently going into alcohol withdrawal syndrome.  He required multiple doses of Ativan and had a CIWA score of 25, worsening AMS, intubated and transferred here for further management. Post intubation, pt became hypotensive, started on NE gtt. Lipase 800, CT A/P with no evidence of complications of pancreatitis.     4/17 intubated at OSH  4/18 s/p bronched due to increased secretions.   4/19 extubated      Interval Problem Update  Reviewed last 24 hour events:  No acute events overnight  Remains delirious.   Febrile 38.2  HR in 70-100s  On 7L oxymask with >90%  MAP in 50s.   On NE at 4mcg/min  On precedex gtt at 0.3  WBC 13-15K  Hgb 8.5, Plt 390  Sodium 151  Creatinine 0.64. HCO3 33  -1000s  On prednisolone  C/o left leg thigh yesterday, lidocaine patch was applied. This afternoon, pt denies any pain.     Review of Systems  Review of Systems   Unable to perform ROS: Intubated        Vital Signs for last 24 hours   Pulse:  [] 74  Resp:  [14-27] 16  BP: ()/(41-72) 96/41  SpO2:  [84 %-100 %] 85 %    Hemodynamic parameters for last 24 hours  CVP:  [-42 MM HG-283 MM HG] -39 MM HG    Respiratory Information for the last 24 hours       Physical Exam   Physical Exam  Vitals and nursing note reviewed.   Constitutional:       General: He is not in acute distress.     Appearance: He is well-developed. He is ill-appearing.      Comments: Alert, sitting in chair  Mother at bedside   HENT:      Head: Normocephalic.      Nose:      Comments: Cortrak in place     Mouth/Throat:      Mouth: Mucous membranes are dry.   Neck:      Vascular: No JVD.      Trachea: No tracheal  deviation.   Cardiovascular:      Rate and Rhythm: Normal rate and regular rhythm.      Pulses: Normal pulses.   Pulmonary:      Breath sounds: Rales present. No wheezing.      Comments: Diminished at bases  Abdominal:      General: There is distension.      Palpations: Abdomen is soft.      Tenderness: There is no abdominal tenderness. There is no guarding.   Musculoskeletal:         General: No tenderness.      Right lower leg: Edema present.      Left lower leg: Edema present.   Skin:     General: Skin is warm and dry.      Capillary Refill: Capillary refill takes less than 2 seconds.      Findings: No rash.   Neurological:      General: No focal deficit present.      Mental Status: He is alert.      Cranial Nerves: No cranial nerve deficit.      Sensory: No sensory deficit.      Motor: No weakness.         Medications  Current Facility-Administered Medications   Medication Dose Route Frequency Provider Last Rate Last Admin   • lidocaine (LIDODERM) 5 % 1 Patch  1 Patch Transdermal Q24HR Miky Gama, D.O.   1 Patch at 04/22/21 1742   • furosemide (LASIX) injection 40 mg  40 mg Intravenous Q8HRS Miky Gama, D.O.   40 mg at 04/23/21 0535   • acetaZOLAMIDE (DIAMOX) tablet 250 mg  250 mg Oral BID DIURETIC Miky Gama, D.O.   250 mg at 04/23/21 0536   • potassium bicarbonate (KLYTE) effervescent tablet 50 mEq  50 mEq Enteral Tube TID Miky Gama, D.O.   50 mEq at 04/23/21 0535   • dexmedetomidine (PRECEDEX) 400 mcg/100mL NS premix infusion  0.1-1.5 mcg/kg/hr Intravenous Continuous Miky Gama D.O. 9.4 mL/hr at 04/23/21 0337 0.3 mcg/kg/hr at 04/23/21 0337   • albuterol (PROVENTIL) 2.5mg/0.5ml nebulizer solution 2.5 mg  2.5 mg Nebulization Q4H PRN (RT) Evin Hammer M.D.   2.5 mg at 04/20/21 0758   • lactulose 20 GM/30ML solution 30 mL  30 mL Enteral Tube TID Miky Gama D.O.   30 mL at 04/23/21 0535   • Respiratory Therapy Consult   Nebulization Continuous RT Jeremy M Gonda, M.D.       • MD Alert...ICU Electrolyte  Replacement per Pharmacy   Other PHARMACY TO DOSE Jeremy M Gonda, M.D.       • Pharmacy Consult: Enteral tube insertion - review meds/change route/product selection  1 Each Other PHARMACY TO DOSE Jeremy M Gonda, M.D.       • norepinephrine (Levophed) infusion 8 mg/250 mL (premix)  0-30 mcg/min Intravenous Continuous Jeremy M Gonda, M.D. 7.5 mL/hr at 04/23/21 0400 4 mcg/min at 04/23/21 0400   • thiamine (Vitamin B-1) tablet 100 mg  100 mg Enteral Tube DAILY Radha Beckett M.D.   100 mg at 04/23/21 0535   • folic acid (FOLVITE) tablet 1 mg  1 mg Enteral Tube DAILY Radha Beckett M.D.   1 mg at 04/23/21 0535   • prednisoLONE (PRELONE) 15 MG/5ML syrup 39.9 mg  39.9 mg Enteral Tube DAILY Jeremy M Gonda, M.D.   39.9 mg at 04/22/21 1953   • enoxaparin (LOVENOX) inj 40 mg  40 mg Subcutaneous Q12HRS Jeremy M Gonda, M.D.   40 mg at 04/23/21 0535       Fluids    Intake/Output Summary (Last 24 hours) at 4/23/2021 0631  Last data filed at 4/23/2021 0446  Gross per 24 hour   Intake 1544.51 ml   Output 3700 ml   Net -2155.49 ml       Laboratory          Recent Labs     04/21/21  0250 04/22/21  0715 04/23/21  0347   SODIUM 136 145 151*   POTASSIUM 3.6 3.8 4.1   CHLORIDE 92* 102 106   CO2 38* 37* 33   BUN 19 26* 29*   CREATININE 0.50 0.39* 0.64   PHOSPHORUS 3.4  --   --    CALCIUM 8.5 8.8 8.9     Recent Labs     04/21/21  0250 04/22/21  0715 04/23/21  0347   ALTSGPT 33 38 54*   ASTSGOT 178* 165* 209*   ALKPHOSPHAT 171* 150* 141*   TBILIRUBIN 13.5* 13.9* 13.7*   GLUCOSE 194* 174* 176*     Recent Labs     04/21/21  0250 04/22/21  0425 04/22/21  0715 04/23/21  0347   WBC 17.2* 15.3*  --  13.3*   NEUTSPOLYS 85.40* 87.70*  --  87.70*   LYMPHOCYTES 5.90* 6.00*  --  5.90*   MONOCYTES 4.60 4.30  --  5.20   EOSINOPHILS 0.00 0.00  --  0.00   BASOPHILS 0.30 0.20  --  0.10   ASTSGOT 178*  --  165* 209*   ALTSGPT 33  --  38 54*   ALKPHOSPHAT 171*  --  150* 141*   TBILIRUBIN 13.5*  --  13.9* 13.7*     Recent Labs     04/20/21  0905  04/21/21  0250 04/22/21  0425 04/23/21  0347   RBC  --  2.74* 2.64* 2.54*   HEMOGLOBIN  --  9.1* 9.1* 8.5*   HEMATOCRIT  --  28.8* 28.5* 27.5*   PLATELETCT  --  347 363 390   PROTHROMBTM 18.2*  --   --   --    INR 1.46*  --   --   --        Imaging  X-Ray:  I have personally reviewed the images and compared with prior images.    Assessment/Plan  Septic shock (HCC)- (present on admission)  Assessment & Plan  This is Septic shock Present on admission  SIRS criteria identified on my evaluation include: Tachycardia, with heart rate greater than 90 BPM, Tachypnea, with respirations greater than 20 per minute and Leukocytosis, with WBC greater than 12,000  Source is pulmonary vs SIRS from pancreatitis  Suspected onset of infection (date and time) on arrival  Presentation includes: Severe sepsis present and persistent hypotension after 30 ml/kg completed.   Despite appropriate fluid resuscitation with crystalloid given per sepsis guidelines, the patient remains hypotensive with systolic blood pressure less than 90 or MAP less than 65  Hemodynamic support with additional fluids and IV vasopressors as needed to maintain a SBP of 90 or MAP of 65  IV antibiotics as appropriate for source of sepsis  Reassessment: I have reassessed the patient's hemodynamic status    Resolved      Acute respiratory failure with hypoxia (HCC)- (present on admission)  Assessment & Plan  Intubated at outside hospital for aspiration and hypoxia  4/18 s/p bronch due to increased secretions.   Extubated 4/20  Very minimal ascites, no need for paracentesis.     Plan:   On Oxymask 7L, continue weaning down.   Will hold diuresis given hypotension. Sodium is up in 151  Replete electrolytes to keep K >4 and Mg >2  Goal sat >88%, paO2 >60  Early mobility  Continue TF at goal rate  To have repeat swallow study today  PT/OT, goal level 2-3 today.     Alcohol withdrawal (HCC)- (present on admission)  Assessment & Plan  Patient has history of alcohol abuse  since the age of 16 as per the documentation  Hx of large alcohol abuse, 12 packs of beers/day and 1pint of alcohol daily, last drink a day of admission.   Intubated at OSH due to acute resp failure due to aspiration  Extubated 4/20     Plan:   Precedex gtt, wean off if able.   Vitamins, thiamine, folate  Keep K >4 and Mg >2  High risk for seizures  Non pharmacological therapy for delirium  PT/OT to eval and treat    Alcoholic hepatitis- (present on admission)  Assessment & Plan  AST/ALT is 119/18 and T.Bili of 16.5  Likely due to alcohol  USG ordered to evaluate for obstruction. Might need MRCP if positive  Maddrey's DS is 39, will continue prednisone  MELD score is 29, indicating 19.6% 3 month mortality  Continue lactulose 30 TID, titrate to -500cc output.      Hyponatremia- (present on admission)  Assessment & Plan  Sodium level was 117-121 at the outside hospital  Likely due to his alcoholic cirrhosis.   Improved. Monitor    Aspiration pneumonitis (HCC)  Assessment & Plan  Unclear, as per the EMS person, patient aspirated and was emergently intubated  Pro calcitonin elevated at 1.49, chest x ray is normal   Unclear if it is pneumonia or pneumonitis, continue Unasyn as procalcitonin is elevated  On augmentin to finish 5 day course      Pancreatitis- (present on admission)  Assessment & Plan  Questionable ?lipase at the outside hospital was 800, however CT abdomen showed no signs of acute pancreatitis   Possibly triggered by alcohol withdrawal  Repeat lipase here was 412    Fever  Assessment & Plan  Fever and hypotension. - unclear etiology  Pt may be overdiuresing, will back off on diuresis.   Will work for possible infectious cause  Weaning down O2 7L min to keep sat >90%  Discontinue lasix and diamox  Will get blood cultlures x2    Goals of care, counseling/discussion  Assessment & Plan  4/19 discussed with Bryon, brother and sister in law on the phone and updated pt's critical condition. Mother is  coming tonight. All questions were answered  4/20 mother was at bedside and I updated her on patient's condition. All questions were answered  4/22 mother was at bedside and I updated her on patient's condition. All questions were answered  4/23 had a long discussion with brother and mother to update about patient's critical condition.    Hypophosphatemia- (present on admission)  Assessment & Plan  Severe  Continue to agressively replete    Elevated bilirubin- (present on admission)  Assessment & Plan  Monitor      Alcohol abuse- (present on admission)  Assessment & Plan  Since the age of 16 years as per the documentation  Pt is actively drinking. 12packs of beer daily and a pint of hard liquor daily.    Last drink was before admission   Needs alcohol cessation counseling and resources on discharge       VTE:  Lovenox  Ulcer: H2 Antagonist  Lines: Central Line  Ongoing indication addressed and Sotomayor Catheter  Ongoing indication addressed    I have performed a physical exam and reviewed and updated ROS and Plan today (4/23/2021). In review of yesterday's note (4/22/2021), there are no changes except as documented above.     Discussed patient condition and risk of morbidity and/or mortality with RN, RT, Pharmacy, Charge nurse / hot rounds and Patient     The patient remains critically ill.  Critical care time = 50 minutes in directly providing and coordinating critical care and extensive data review.  No time overlap and excludes procedures.

## 2021-04-23 NOTE — DISCHARGE PLANNING
Care Transition Team Discharge Planning    Anticipated Discharge Disposition: TBD    Action: Per AM rounds, pt transferred here from [place]. Pt was extubated on the 20th. Pt has ETOH hx. Pt is confused and impulsive. Pt is restrained and sedated. Medical team will begin diuresing. Pt has pulmonary edema. Pt failed swallow eval, and has a right nare cortrak. Pt has BMS. Pt had curtis output. 3 person assist w/ mobilization to edge of bed last night.        Barriers to Discharge: medical clearance/stability    Plan: Lsw will continue to follow, attend rounds for medical updates, and assist w/ d/c planning.

## 2021-04-23 NOTE — PROGRESS NOTES
Notified Dr. Gama patient is complaining of sharp pain in left lateral thigh, non-tender to touch, and no swelling.  Per Dr. Gama Lidocaine patch ordered and applied.

## 2021-04-23 NOTE — DIETARY
"Nutrition support weekly update:  Day 6 of admit.  Irving Ivory is a 40 y.o. male with admitting DX of ETOH Withdrawal, Alcohol withdrawal delirium (HCC).    Tube feeding initiated on 4/17.  Current TF via gastric Cortrak: Peptamen Intense VHP @ goal rate 65 ml/hr, providing 1560 kcal, 143 grams protein, and 1310 ml free water per day.    Assessment:  Weight 121.9 kg, BMI 38.56  Weight down 9% since admit (was 134 kg on 4/17).  Weight loss is favorable and consistent with -12.5 L fluid per I/O and hypocaloric TF regimen.     Estimated needs per current weight and acuity:  REE per MSJ x1-1.1 = 2279-6878 kcal/day  0.8-1.0 gram protein/kg =  grams protein/day    Evaluation:   1. Pt was extubated 4/20.  2. Cortrak/TF remains indicated per SLP evaluation 4/22. However, SLP did note, \"Pt is appropriate for NON-MEAL stim 1:1 with SLP only, for MT2/PU4 pre-feeding.  Ok for limited ice chips with sign and swallow sequence posted at head of bed.\"  3. Labs: Na 151, glu 176, BUN 29, AST/ALT up, alk phos and T-bili fairly stable, ammonia 66 (4/21)  4. Meds include folic acid, lasix, lactulose, electrolyte replacement, levophed @ 3 mcg/min, prednisolone, and thiamine.  5. Last BM 4/22.  6. No wounds noted.  7. Will change TF regimen per assessment of current nutritional needs. Low-CHO TF formula most appropriate.  8. Change in TF regimen will provide slightly more free water, which is favorable in the setting of hypernatremia and elevated BUN.    Malnutrition risk: None identified @ this time.     Recommendations/Plan:  1. Change TF to Diabetisource AC @ 25 ml/hr and advance per protocol to goal rate 75 ml/hr to provide 2160 kcal, 108 grams protein, and 1476 ml free water per day.  2. Fluids per MD. Monitor Na with TF change and consider addition of free water flushes as indicated.  3. PO diet per SLP.    RD following.   "

## 2021-04-23 NOTE — ASSESSMENT & PLAN NOTE
Fever - ddx include aspiration, drug induced (on precedex), DVT  4/24 Off lasix, diamox and lactulose  4/24 repeated blood cultures obtained, negative to date    Plan:   Repeat procalcitonin  Start unasyn.   US LE to rule out DVT

## 2021-04-23 NOTE — CARE PLAN
Problem: Communication  Goal: The ability to communicate needs accurately and effectively will improve  Outcome: PROGRESSING AS EXPECTED     Problem: Safety  Goal: Will remain free from injury  Outcome: PROGRESSING AS EXPECTED  Goal: Will remain free from falls  Outcome: PROGRESSING AS EXPECTED     Problem: Infection  Goal: Will remain free from infection  Outcome: PROGRESSING AS EXPECTED     Problem: Respiratory:  Goal: Respiratory status will improve  Outcome: PROGRESSING AS EXPECTED     Problem: Safety - Medical Restraint  Goal: Remains free of injury from restraints (Restraint for Interference with Medical Device)  Description: INTERVENTIONS:  1. Determine that other, less restrictive measures have been tried or would not be effective before applying the restraint  2. Evaluate the patient's condition at the time of restraint application  3. Inform patient/family regarding the reason for restraint  4. Q2H: Monitor safety, psychosocial status, comfort, nutrition and hydration  Outcome: PROGRESSING AS EXPECTED  Goal: Free from restraint(s) (Restraint for Interference with Medical Device)  Description: INTERVENTIONS:  1. ONCE/SHIFT or MINIMUM Q12H: Assess and document the continuing need for restraints  2. Q24H: Continued use of restraint requires LIP to perform face to face examination and written order  3. Identify and implement measures to help patient regain control  Outcome: PROGRESSING AS EXPECTED

## 2021-04-23 NOTE — CARE PLAN
Problem: Fluid Volume:  Goal: Will maintain balanced intake and output  Outcome: PROGRESSING AS EXPECTED  Note: Monitoring I/Os, see flow sheet     Problem: Safety - Medical Restraint  Goal: Remains free of injury from restraints (Restraint for Interference with Medical Device)  Description: INTERVENTIONS:  1. Determine that other, less restrictive measures have been tried or would not be effective before applying the restraint  2. Evaluate the patient's condition at the time of restraint application  3. Inform patient/family regarding the reason for restraint  4. Q2H: Monitor safety, psychosocial status, comfort, nutrition and hydration  Outcome: PROGRESSING AS EXPECTED  Flowsheets (Taken 4/20/2021 5134 by Gaby Jama R.N.)  Addressed this shift: Remains free of injury from restraints (restraint for interference with medical device):   Determine that other, less restrictive measures have been tried or would not be effective before applying the restraint   Evaluate the patient's condition at the time of restraint application   Inform patient/family regarding the reason for restraint   Every 2 hours: Monitor safety, psychosocial status, comfort, nutrition and hydration  Goal: Free from restraint(s) (Restraint for Interference with Medical Device)  Description: INTERVENTIONS:  1. ONCE/SHIFT or MINIMUM Q12H: Assess and document the continuing need for restraints  2. Q24H: Continued use of restraint requires LIP to perform face to face examination and written order  3. Identify and implement measures to help patient regain control  Outcome: PROGRESSING AS EXPECTED

## 2021-04-24 PROBLEM — E87.0 HYPERNATREMIA: Status: ACTIVE | Noted: 2021-01-01

## 2021-04-24 NOTE — PROGRESS NOTES
Critical Care Progress Note    Date of admission  4/17/2021    Chief Complaint  Respiratory failure, encephalopathy    Hospital Course  40-year-old male with BMI 39 transferred from [facility] where he was admitted several days ago prior to admission for pancreatitis and alcohol abuse subsequently going into alcohol withdrawal syndrome.  He required multiple doses of Ativan and had a CIWA score of 25, worsening AMS, intubated and transferred here for further management. Post intubation, pt became hypotensive, started on NE gtt. Lipase 800, CT A/P with no evidence of complications of pancreatitis.     4/17 intubated at OSH  4/18 s/p bronched due to increased secretions.   4/19 extubated      Interval Problem Update  Reviewed last 24 hour events:  No acute events overnight  Remains delirious. On precedex gtt.   Remains low grad fever 38.2  HR in 80s  MAPs >65  On 7L oxymask with >90%  On NE at 4mcg/min  On precedex gtt at 0.5  WBC 13-15K  Hgb 8.5, Plt 390-500  Sodium 151-152  Creatinine 0.40. HCO3 33  -1000s  On prednisolone    Review of Systems  Review of Systems   Unable to perform ROS: Intubated        Vital Signs for last 24 hours   Temp:  [37.8 °C (100 °F)-38.1 °C (100.6 °F)] 37.8 °C (100 °F)  Pulse:  [78-98] 85  Resp:  [16-25] 21  BP: ()/(33-79) 118/64  SpO2:  [93 %-98 %] 96 %    Hemodynamic parameters for last 24 hours       Respiratory Information for the last 24 hours       Physical Exam   Physical Exam  Vitals and nursing note reviewed.   Constitutional:       General: He is not in acute distress.     Appearance: He is well-developed. He is ill-appearing.      Comments: Alert, sitting in chair  Mother at bedside   HENT:      Head: Normocephalic.      Nose:      Comments: Cortrak in place     Mouth/Throat:      Mouth: Mucous membranes are dry.   Neck:      Vascular: No JVD.      Trachea: No tracheal deviation.   Cardiovascular:      Rate and Rhythm: Normal rate and regular rhythm.       Pulses: Normal pulses.   Pulmonary:      Breath sounds: Rales present. No wheezing.      Comments: Diminished at bases  Abdominal:      General: There is distension.      Palpations: Abdomen is soft.      Tenderness: There is no abdominal tenderness. There is no guarding.   Musculoskeletal:         General: No tenderness.      Right lower leg: Edema present.      Left lower leg: Edema present.   Skin:     General: Skin is warm and dry.      Capillary Refill: Capillary refill takes less than 2 seconds.      Findings: No rash.   Neurological:      General: No focal deficit present.      Mental Status: He is alert.      Motor: Weakness present.         Medications  Current Facility-Administered Medications   Medication Dose Route Frequency Provider Last Rate Last Admin   • midodrine (PROAMATINE) tablet 10 mg  10 mg Oral TID WITH MEALS SARIAH Bradford.O.   10 mg at 04/24/21 1025   • haloperidol (HALDOL) tablet 2-5 mg  2-5 mg Oral Q6HRS PRN Miky Gama D.O.       • potassium bicarbonate (KLYTE) effervescent tablet 50 mEq  50 mEq Enteral Tube DAILY Miky Gama D.O.   50 mEq at 04/24/21 0513   • dexmedetomidine (PRECEDEX) 400 mcg/100mL NS premix infusion  0.1-1.5 mcg/kg/hr Intravenous Continuous SARIAH Bradford.O. 21.9 mL/hr at 04/24/21 0928 0.7 mcg/kg/hr at 04/24/21 0928   • albuterol (PROVENTIL) 2.5mg/0.5ml nebulizer solution 2.5 mg  2.5 mg Nebulization Q4H PRN (RT) Evin Hammer M.D.   2.5 mg at 04/20/21 0758   • Respiratory Therapy Consult   Nebulization Continuous RT Jeremy M Gonda, M.D.       • MD Alert...ICU Electrolyte Replacement per Pharmacy   Other PHARMACY TO DOSE Jeremy M Gonda, M.D.       • Pharmacy Consult: Enteral tube insertion - review meds/change route/product selection  1 Each Other PHARMACY TO DOSE Jeremy M Gonda, M.D.       • norepinephrine (Levophed) infusion 8 mg/250 mL (premix)  0-30 mcg/min Intravenous Continuous Jeremy M Gonda, M.D. 3.8 mL/hr at 04/24/21 0928 2 mcg/min at 04/24/21 0928   •  thiamine (Vitamin B-1) tablet 100 mg  100 mg Enteral Tube DAILY Radha Beckett M.D.   100 mg at 04/24/21 0513   • folic acid (FOLVITE) tablet 1 mg  1 mg Enteral Tube DAILY Radha Beckett M.D.   1 mg at 04/24/21 0513   • prednisoLONE (PRELONE) 15 MG/5ML syrup 39.9 mg  39.9 mg Enteral Tube DAILY Jeremy M Gonda, M.D.   39.9 mg at 04/23/21 2000   • enoxaparin (LOVENOX) inj 40 mg  40 mg Subcutaneous Q12HRS Jeremy M Gonda, M.D.   40 mg at 04/24/21 0512       Fluids    Intake/Output Summary (Last 24 hours) at 4/24/2021 1040  Last data filed at 4/24/2021 0800  Gross per 24 hour   Intake 2114.99 ml   Output 3725 ml   Net -1610.01 ml       Laboratory          Recent Labs     04/22/21  0715 04/23/21  0347 04/24/21  0455   SODIUM 145 151* 152*   POTASSIUM 3.8 4.1 3.2*   CHLORIDE 102 106 111   CO2 37* 33 31   BUN 26* 29* 29*   CREATININE 0.39* 0.64 0.40*   CALCIUM 8.8 8.9 9.1     Recent Labs     04/22/21  0715 04/23/21  0347 04/24/21  0455   ALTSGPT 38 54* 71*   ASTSGOT 165* 209* 243*   ALKPHOSPHAT 150* 141* 148*   TBILIRUBIN 13.9* 13.7* 13.9*   GLUCOSE 174* 176* 177*     Recent Labs     04/22/21  0425 04/22/21  0715 04/23/21  0347 04/24/21  0455   WBC 15.3*  --  13.3* 15.5*   NEUTSPOLYS 87.70*  --  87.70* 86.10*   LYMPHOCYTES 6.00*  --  5.90* 7.30*   MONOCYTES 4.30  --  5.20 5.20   EOSINOPHILS 0.00  --  0.00 0.00   BASOPHILS 0.20  --  0.10 0.10   ASTSGOT  --  165* 209* 243*   ALTSGPT  --  38 54* 71*   ALKPHOSPHAT  --  150* 141* 148*   TBILIRUBIN  --  13.9* 13.7* 13.9*     Recent Labs     04/22/21  0425 04/23/21  0347 04/24/21  0455   RBC 2.64* 2.54* 2.80*   HEMOGLOBIN 9.1* 8.5* 9.3*   HEMATOCRIT 28.5* 27.5* 30.3*   PLATELETCT 363 390 502*       Imaging  X-Ray:  I have personally reviewed the images and compared with prior images.    Assessment/Plan  Septic shock (HCC)- (present on admission)  Assessment & Plan  This is Septic shock Present on admission  SIRS criteria identified on my evaluation include: Tachycardia,  with heart rate greater than 90 BPM, Tachypnea, with respirations greater than 20 per minute and Leukocytosis, with WBC greater than 12,000  Source is pulmonary vs SIRS from pancreatitis  Suspected onset of infection (date and time) on arrival  Presentation includes: Severe sepsis present and persistent hypotension after 30 ml/kg completed.   Despite appropriate fluid resuscitation with crystalloid given per sepsis guidelines, the patient remains hypotensive with systolic blood pressure less than 90 or MAP less than 65  Hemodynamic support with additional fluids and IV vasopressors as needed to maintain a SBP of 90 or MAP of 65  IV antibiotics as appropriate for source of sepsis  Reassessment: I have reassessed the patient's hemodynamic status    Pt was off and on since admission. Blood cultures negative.   He has low grade fever.   Pt is at risk for aspiration. Failed swallow study, only ice chips q2-4 currently.   4/23 developed hypotension and started on NE.   Will add midodrine 10 TID  Will give 1L LR bolus.   Discontinue lasix and lactulose. Pt had 2L of stool output in the past few days.       Acute respiratory failure with hypoxia (HCC)- (present on admission)  Assessment & Plan  Intubated at outside hospital for aspiration and hypoxia  4/18 s/p bronch due to increased secretions.   4/20 Extubated   4/20 Bedside ultrasound with very minimal ascites, no need for paracentesis.     Plan:   On Oxymask 7L sat >90%, continue weaning down.   Holding off diuresis given hypotension on 4/23. Sodium in 151-152  Replete electrolytes to keep K >4 and Mg >2  Goal sat >88%, paO2 >60  Early mobility goal level 3 at least.   PT/OT, goal level 2-3 today.   Continue TF at goal rate. Speech therapy following     Alcohol withdrawal (HCC)- (present on admission)  Assessment & Plan  Patient has history of alcohol abuse since the age of 16 as per the documentation  Hx of large alcohol abuse, 12 packs of beers/day and 1pint of  alcohol daily, last drink a day of admission.     Plan:   On precedex gtt, wean off when able  Vitamins, thiamine, folate  Keep K >4 and Mg >2  Non pharmacological therapy for delirium  PT/OT to eval and treat  Monitor for seizures    Alcoholic hepatitis- (present on admission)  Assessment & Plan  Charla's DS is 39, will continue prednisone for 28 days  MELD score is 29, indicating 19.6% 3 month mortality  Was on lactulose from 4/20-4/24.   Given hypotension and evidence of intravascular depletion, will hold off on lactulose at this point    Hypernatremia- (present on admission)  Assessment & Plan  Sodium in 151-152, likely due to diuresis  Holding off diuresis  Holding off lactulose    Aspiration pneumonitis (HCC)  Assessment & Plan  Unclear, as per the EMS person, patient aspirated and was emergently intubated  Procalcitonin elevated at 1.49, chest x ray is normal   Completed 5 day course of augmentin 4/18-4/22      Pancreatitis- (present on admission)  Assessment & Plan  Questionable ?lipase at the outside hospital was 800, however CT abdomen showed no signs of acute pancreatitis   Possibly triggered by alcohol withdrawal  Repeat lipase here was 412  Monitor    Fever  Assessment & Plan  Fever and hypotension. - unclear etiology  Off lasix, diamox and lactulose  Working on possible infectious cause  Weaning down O2 7L min to keep sat >90%  Discontinue lasix and diamox  Blood cultures pending.     Goals of care, counseling/discussion  Assessment & Plan  4/19 discussed with Bryon, brother and sister in law on the phone and updated pt's critical condition. Mother is coming tonight. All questions were answered  4/20 mother was at bedside and I updated her on patient's condition. All questions were answered  4/22 mother was at bedside and I updated her on patient's condition. All questions were answered  4/23 had a long discussion with brother and mother to update about patient's critical  condition.    Hypophosphatemia- (present on admission)  Assessment & Plan  Severe  Continue to agressively replete    Elevated bilirubin- (present on admission)  Assessment & Plan  Monitor      Alcohol abuse- (present on admission)  Assessment & Plan  Since the age of 16 years as per the documentation  Pt is actively drinking. 12packs of beer daily and a pint of hard liquor daily.    Last drink was before admission   Needs alcohol cessation counseling and resources on discharge       VTE:  Lovenox  Ulcer: H2 Antagonist  Lines: Central Line  Ongoing indication addressed and Sotomayor Catheter  Ongoing indication addressed    I have performed a physical exam and reviewed and updated ROS and Plan today (4/24/2021). In review of yesterday's note (4/23/2021), there are no changes except as documented above.     Discussed patient condition and risk of morbidity and/or mortality with RN, RT, Pharmacy, Charge nurse / hot rounds and Patient     The patient remains critically ill.  Critical care time = 60 minutes in directly providing and coordinating critical care and extensive data review.  No time overlap and excludes procedures.

## 2021-04-24 NOTE — PROGRESS NOTES
Patient impulsive this morning and continues to try and remove restraints as well as other medical devices, including pulse ox. Pulse ox replaced. Patient educated on need for restraints as well as medical devices. Patient needs continuing reinforcement of education. Patient can explain that he is in the hospital because he drank alcohol, but believes he is all better because he has not had a drink in 2 weeks. RN re-educated about disease process and current treatment plan. Patient also believes its January 2021. RN reoriented patient. Patient is also demanding water and ice chips. RN re-educated patient as to why he is not allowed to have anything by mouth. Patient very upset with RN's explanation. There is currently no evidence of learning with nothing by mouth status. RN will continue to reorient and educate patient.

## 2021-04-24 NOTE — CARE PLAN
Problem: Safety  Goal: Will remain free from injury  Outcome: PROGRESSING AS EXPECTED  Note: Patient agitatied and attempting to remove medical devices. Patient re-educated on needs of devices. Family at bedside helping calm patient. Patient near RN station.      Problem: Safety - Medical Restraint  Goal: Remains free of injury from restraints (Restraint for Interference with Medical Device)  Description: INTERVENTIONS:  1. Determine that other, less restrictive measures have been tried or would not be effective before applying the restraint  2. Evaluate the patient's condition at the time of restraint application  3. Inform patient/family regarding the reason for restraint  4. Q2H: Monitor safety, psychosocial status, comfort, nutrition and hydration  Outcome: PROGRESSING AS EXPECTED  Flowsheets (Taken 4/24/2021 1048)  Addressed this shift: Remains free of injury from restraints (restraint for interference with medical device):   Every 2 hours: Monitor safety, psychosocial status, comfort, nutrition and hydration   Inform patient/family regarding the reason for restraint  Goal: Free from restraint(s) (Restraint for Interference with Medical Device)  Description: INTERVENTIONS:  1. ONCE/SHIFT or MINIMUM Q12H: Assess and document the continuing need for restraints  2. Q24H: Continued use of restraint requires LIP to perform face to face examination and written order  3. Identify and implement measures to help patient regain control  Outcome: PROGRESSING AS EXPECTED  Flowsheets (Taken 4/24/2021 1043)  Addressed this shift: Free from restraint(s) (restraint for interference with medical device):   ONCE/SHIFT or MINIMUM Every 12 hours: Assess and document the continuing need for restraints   Every 24 hours: Continued use of restraint requires Licensed Independent Practitioner to perform face to face examination and written order   Identify and implement measures to help patient regain control

## 2021-04-25 NOTE — CARE PLAN
Problem: Communication  Goal: The ability to communicate needs accurately and effectively will improve  Outcome: PROGRESSING AS EXPECTED  Note: Pt. Is able to communicate properly but still with episodes of forgetfulness, requires frequent reorientation.     Problem: Safety  Goal: Will remain free from injury  Outcome: PROGRESSING AS EXPECTED  Goal: Will remain free from falls  Outcome: PROGRESSING AS EXPECTED     Problem: Bowel/Gastric:  Goal: Normal bowel function is maintained or improved  Outcome: PROGRESSING SLOWER THAN EXPECTED  Note: Remains on BMS     Problem: Psychosocial Needs:  Goal: Level of anxiety will decrease  Outcome: PROGRESSING AS EXPECTED

## 2021-04-25 NOTE — PROGRESS NOTES
Patient's bedside alarm went off while this RN is attending another patient.  Upon arrival at bedside, patient is already sitting on the chair assisted by other staff. As per report he was found out to be already standing at the edge of the bed .  Patient was noted to have removed his curtis catheter and flexiseal, no bleeding on the insertion site noted but noted to have a small skin tear onto his Rt upper abdomen.  Precedex drip at [place], informed Dr. Webb of the event. Patient was able to calm down after few minutes, got back to bed after cleaning him up, put back on restraints and bed alarm.

## 2021-04-25 NOTE — PROGRESS NOTES
"Patient was noted that he was able to removed his Rt restraints and is trying to take off the other restraints on the other arm. Patient attended at bedside and asked what he needs. Patient yelled, \" I am f* out of here!\". Tried to re assure and de escalate the patient but he became more agitated and aggressive, pushed this RN away and tried to get out of bed. Called for staff assist, and increased Precedex drip. Informed Dr. Webb, modified order of PRN Haldol to IV-given 5mg IV .      "

## 2021-04-25 NOTE — PROGRESS NOTES
Patient is more cooperative at this point, verbalized frustration of his desire to eat and get out of the hospital.  Patient is still kept on restraints due to bouts of confusion and impulsiveness.  Patient repositioned on bed, brushed teeth, given him some ice chips and kept the call light within reach.

## 2021-04-25 NOTE — PROGRESS NOTES
Patient has been asking for water and ice chips very frequently since this RN assumed care. Based on the swallow eval done on 4/22/21 pt. is only allowed to have 2-3 ice chips every 2 to 4 hours but is not compliant at all.  Educated patient about the risk os aspiration but he said he is really very hungry and wanted to drink and eat. This RN showed his cortrak and explained to him that he is being fed through that tube but he said it's not doing anything for him.    2030. Consulted Dr. Kruger as he passed by the [facility] if it's ok to give the patient water for trial as he is very insistent.He said its fine.  Patient was given 3 sips of water while sitting up staring on bed.  Pt.  Was noted coughing in between sips of water.This RN,again explained to the patient that he needs to be more cautions with his oral intake, so he will have the ice chips for now until he gets re-evaluation for swallow eval, pt agreed but seems forgetful of instructions.

## 2021-04-25 NOTE — PROGRESS NOTES
Critical Care Progress Note    Date of admission  4/17/2021    Chief Complaint  Respiratory failure, encephalopathy    Hospital Course  40-year-old male with BMI 39 transferred from [facility] where he was admitted several days ago prior to admission for pancreatitis and alcohol abuse subsequently going into alcohol withdrawal syndrome.  He required multiple doses of Ativan and had a CIWA score of 25, worsening AMS, intubated and transferred here for further management. Post intubation, pt became hypotensive, started on NE gtt. Lipase 800, CT A/P with no evidence of complications of pancreatitis.     4/17 intubated at OSH  4/18 s/p bronched due to increased secretions.   4/19 extubated      Interval Problem Update  Reviewed last 24 hour events:  No acute events overnight  Remains delirious. On precedex gtt 1.5  Remains low grade fever 38.2  Blood cultures obtained.   HR in 80s  MAPs >65  On 7L oxymask with >90%  Off and on NE gtt. Midodrine 10 TID started 4/25  WBC 13-15K  Hgb 9.2, Plt 500s  Sodium 151-152  Creatinine 0.78. HCO3 34  -1000s  On prednisolone    Review of Systems  Review of Systems   Unable to perform ROS: Intubated        Vital Signs for last 24 hours   Temp:  [36.6 °C (97.8 °F)-37.9 °C (100.2 °F)] 37.2 °C (99 °F)  Pulse:  [] 73  Resp:  [14-24] 20  BP: ()/(45-69) 104/46  SpO2:  [91 %-98 %] 94 %    Hemodynamic parameters for last 24 hours       Respiratory Information for the last 24 hours       Physical Exam   Physical Exam  Vitals and nursing note reviewed.   Constitutional:       General: He is not in acute distress.     Appearance: He is well-developed. He is ill-appearing.      Comments: Alert, sitting in chair  Mother at bedside   HENT:      Head: Normocephalic.      Nose:      Comments: Cortrak in place     Mouth/Throat:      Mouth: Mucous membranes are dry.   Neck:      Vascular: No JVD.      Trachea: No tracheal deviation.   Cardiovascular:      Rate and  Rhythm: Normal rate and regular rhythm.      Pulses: Normal pulses.   Pulmonary:      Breath sounds: Rales present. No wheezing.      Comments: Diminished at bases  Abdominal:      General: There is distension.      Palpations: Abdomen is soft.      Tenderness: There is no abdominal tenderness. There is no guarding.   Musculoskeletal:         General: No tenderness.      Right lower leg: Edema present.      Left lower leg: Edema present.   Skin:     General: Skin is warm and dry.      Capillary Refill: Capillary refill takes less than 2 seconds.      Findings: No rash.   Neurological:      General: No focal deficit present.      Mental Status: He is alert.      Motor: Weakness present.         Medications  Current Facility-Administered Medications   Medication Dose Route Frequency Provider Last Rate Last Admin   • LORazepam (ATIVAN) injection 0.5-2 mg  0.5-2 mg Intravenous Q2HRS PRN Eleuterio Treviño M.D.       • midodrine (PROAMATINE) tablet 10 mg  10 mg Oral TID WITH MEALS SARIAH Bradford.O.   10 mg at 04/25/21 0641   • haloperidol lactate (HALDOL) injection 2-5 mg  2-5 mg Intravenous Q4HRS PRN Eleuterio rTeviño M.D.   5 mg at 04/24/21 2327   • potassium bicarbonate (KLYTE) effervescent tablet 50 mEq  50 mEq Enteral Tube DAILY SARIAH Bradford.O.   50 mEq at 04/25/21 0520   • dexmedetomidine (PRECEDEX) 400 mcg/100mL NS premix infusion  0.1-1.5 mcg/kg/hr Intravenous Continuous CIERA BradfordO. 37.5 mL/hr at 04/25/21 0620 1.2 mcg/kg/hr at 04/25/21 0620   • albuterol (PROVENTIL) 2.5mg/0.5ml nebulizer solution 2.5 mg  2.5 mg Nebulization Q4H PRN (RT) Evin Hammer M.D.   2.5 mg at 04/20/21 0758   • Respiratory Therapy Consult   Nebulization Continuous RT Jeremy M Gonda, M.D.       • MD Alert...ICU Electrolyte Replacement per Pharmacy   Other PHARMACY TO DOSE Jeremy M Gonda, M.D.       • Pharmacy Consult: Enteral tube insertion - review meds/change route/product selection  1 Each Other PHARMACY TO DOSE Shiv  M Gonda, M.D.       • norepinephrine (Levophed) infusion 8 mg/250 mL (premix)  0-30 mcg/min Intravenous Continuous Jeremy M Gonda, M.D.   Stopped at 04/24/21 1116   • thiamine (Vitamin B-1) tablet 100 mg  100 mg Enteral Tube DAILY Radha Beckett M.D.   100 mg at 04/25/21 0520   • folic acid (FOLVITE) tablet 1 mg  1 mg Enteral Tube DAILY Radha Beckett M.D.   1 mg at 04/25/21 0521   • prednisoLONE (PRELONE) 15 MG/5ML syrup 39.9 mg  39.9 mg Enteral Tube DAILY Jeremy M Gonda, M.D.   39.9 mg at 04/24/21 2043   • enoxaparin (LOVENOX) inj 40 mg  40 mg Subcutaneous Q12HRS Jeremy M Gonda, M.D.   40 mg at 04/25/21 0520       Fluids    Intake/Output Summary (Last 24 hours) at 4/25/2021 0658  Last data filed at 4/25/2021 0600  Gross per 24 hour   Intake 3191.61 ml   Output 1530 ml   Net 1661.61 ml       Laboratory          Recent Labs     04/23/21 0347 04/24/21  0455 04/25/21  0430   SODIUM 151* 152* 147*   POTASSIUM 4.1 3.2* 3.4*   CHLORIDE 106 111 109   CO2 33 31 29   BUN 29* 29* 34*   CREATININE 0.64 0.40* 0.78   CALCIUM 8.9 9.1 8.7     Recent Labs     04/23/21 0347 04/24/21  0455 04/25/21  0430   ALTSGPT 54* 71* 77*   ASTSGOT 209* 243* 233*   ALKPHOSPHAT 141* 148* 140*   TBILIRUBIN 13.7* 13.9* 13.2*   GLUCOSE 176* 177* 205*     Recent Labs     04/23/21 0347 04/24/21  0455 04/25/21  0430   WBC 13.3* 15.5* 15.4*   NEUTSPOLYS 87.70* 86.10* 86.20*   LYMPHOCYTES 5.90* 7.30* 7.60*   MONOCYTES 5.20 5.20 5.10   EOSINOPHILS 0.00 0.00 0.00   BASOPHILS 0.10 0.10 0.10   ASTSGOT 209* 243* 233*   ALTSGPT 54* 71* 77*   ALKPHOSPHAT 141* 148* 140*   TBILIRUBIN 13.7* 13.9* 13.2*     Recent Labs     04/23/21  0347 04/24/21  0455 04/25/21  0430   RBC 2.54* 2.80* 2.75*   HEMOGLOBIN 8.5* 9.3* 9.2*   HEMATOCRIT 27.5* 30.3* 29.9*   PLATELETCT 390 502* 504*       Imaging  X-Ray:  I have personally reviewed the images and compared with prior images.    Assessment/Plan  Septic shock (HCC)- (present on admission)  Assessment &  Plan  This is Septic shock Present on admission  SIRS criteria identified on my evaluation include: Tachycardia, with heart rate greater than 90 BPM, Tachypnea, with respirations greater than 20 per minute and Leukocytosis, with WBC greater than 12,000  Source is pulmonary vs SIRS from pancreatitis  Suspected onset of infection (date and time) on arrival  Presentation includes: Severe sepsis present and persistent hypotension after 30 ml/kg completed.   Despite appropriate fluid resuscitation with crystalloid given per sepsis guidelines, the patient remains hypotensive with systolic blood pressure less than 90 or MAP less than 65  Hemodynamic support with additional fluids and IV vasopressors as needed to maintain a SBP of 90 or MAP of 65  IV antibiotics as appropriate for source of sepsis  Reassessment: I have reassessed the patient's hemodynamic status    Pt was off and on since admission. Blood cultures negative.   He has low grade fever.   Pt is at risk for aspiration.   4/21 Failed swallow study, only ice chips q2-4 currently.   4/23 developed hypotension and started on NE.   4/24 Added midodrine 10 TID  4/24 Discontinued lasix and lactulose due to hypernatremia, hypotension. Pt had 2L of stool output in the past few days.     Plan:   Pt remains to have low grade fever, blood cultures have been obtained.   Repeat procalcitonin  Will re-start unasyn, treat for 5 days  Other diff include drug-induced fever (been on precedex), DVT  Get US LE to rule out DVT      Acute respiratory failure with hypoxia (HCC)- (present on admission)  Assessment & Plan  Intubated at outside hospital for aspiration and hypoxia  4/18 s/p bronch due to increased secretions.   4/20 Extubated   4/20 Bedside ultrasound with very minimal ascites, no need for paracentesis.   Failed swallow study, on tube feed    Plan:   Weaning down oxygen, on 6L oxymask overnight  Replete electrolytes to keep K >4 and Mg >2  Goal sat >88%, paO2 >60  Early  mobility goal level 3 at least.   PT/OT, goal level 2-3 today.   Continue TF at goal rate. Speech therapy following     Alcohol withdrawal (HCC)- (present on admission)  Assessment & Plan  Patient has history of alcohol abuse since the age of 16 as per the documentation  Hx of large alcohol abuse, 12 packs of beers/day and 1pint of alcohol daily, last drink a day of admission.   Has been on precedex gtt    Plan:   On precedex gtt, wean off when able  Vitamins, thiamine, folate  Keep K >4 and Mg >2  Non pharmacological therapy for delirium  PT/OT to eval and treat  Monitor for seizures    Alcoholic hepatitis- (present on admission)  Assessment & Plan  Charla's DS is 39, will continue prednisone for 28 days  MELD score is 29, indicating 19.6% 3 month mortality  Was on lactulose from 4/20-4/24.   4/24 Given hypotension and evidence of intravascular depletion, lactulose was discontinued    Hypernatremia- (present on admission)  Assessment & Plan  Improving to 147 from 151-152, likely due to diuresis and bowel regimen.   4/24 lasix and lactulose were discontinued.     Aspiration pneumonitis (HCC)  Assessment & Plan  Unclear, as per the EMS person, patient aspirated and was emergently intubated  Procalcitonin elevated at 1.49, chest x ray is normal   Completed 5 day course of augmentin 4/18-4/22  Restarted unasyn on 4/25 given low grade fever, encephalopathy    Pancreatitis- (present on admission)  Assessment & Plan  Questionable ?lipase at the outside hospital was 800, however CT abdomen showed no signs of acute pancreatitis   Possibly triggered by alcohol withdrawal  Repeat lipase here was 412  Monitor    Fever  Assessment & Plan  Fever - ddx include aspiration, drug induced (on precedex), DVT  4/24 Off lasix, diamox and lactulose  4/24 repeated blood cultures obtained, negative to date    Plan:   Repeat procalcitonin  Start unasyn.   US LE to rule out DVT    Goals of care, counseling/discussion  Assessment &  Plan  4/19 discussed with Bryon, brother and sister in law on the phone and updated pt's critical condition. Mother is coming tonight. All questions were answered  4/20 mother was at bedside and I updated her on patient's condition. All questions were answered  4/22 mother was at bedside and I updated her on patient's condition. All questions were answered  4/23 had a long discussion with brother and mother to update about patient's critical condition.    Hypophosphatemia- (present on admission)  Assessment & Plan  Severe  Continue to agressively replete    Elevated bilirubin- (present on admission)  Assessment & Plan  Monitor      Alcohol abuse- (present on admission)  Assessment & Plan  Since the age of 16 years as per the documentation  Pt is actively drinking. 12packs of beer daily and a pint of hard liquor daily.    Last drink was before admission   Needs alcohol cessation counseling and resources on discharge       VTE:  Lovenox  Ulcer: H2 Antagonist  Lines: Central Line  Ongoing indication addressed and Sotomayor Catheter  Ongoing indication addressed    I have performed a physical exam and reviewed and updated ROS and Plan today (4/25/2021). In review of yesterday's note (4/24/2021), there are no changes except as documented above.     Discussed patient condition and risk of morbidity and/or mortality with RN, RT, Pharmacy, Charge nurse / hot rounds and Patient     The patient remains critically ill.  Critical care time = 38 minutes in directly providing and coordinating critical care and extensive data review.  No time overlap and excludes procedures.

## 2021-04-26 PROBLEM — R49.0 HYPOPHONIA WITH HOARSENESS: Status: ACTIVE | Noted: 2021-01-01

## 2021-04-26 PROBLEM — R50.9 FEVER: Status: RESOLVED | Noted: 2021-01-01 | Resolved: 2021-01-01

## 2021-04-26 PROBLEM — R17 ELEVATED BILIRUBIN: Status: RESOLVED | Noted: 2021-01-01 | Resolved: 2021-01-01

## 2021-04-26 PROBLEM — D75.839 THROMBOCYTOSIS: Status: ACTIVE | Noted: 2021-01-01

## 2021-04-26 PROBLEM — D68.9 COAGULOPATHY (HCC): Status: ACTIVE | Noted: 2021-01-01

## 2021-04-26 PROBLEM — D64.9 ANEMIA: Status: ACTIVE | Noted: 2021-01-01

## 2021-04-26 PROBLEM — E53.8 FOLATE DEFICIENCY: Status: RESOLVED | Noted: 2021-01-01 | Resolved: 2021-01-01

## 2021-04-26 PROBLEM — G93.40 ACUTE ENCEPHALOPATHY: Status: ACTIVE | Noted: 2021-01-01

## 2021-04-26 NOTE — PROGRESS NOTES
"Pharmacy Kinetics 40 y.o. male on vancomycin day # 1   2021    Vancomycin New Start  Provider specified end date: tbd    Indication for Treatment: Pneumonia, empiric coverage    Pertinent history per medical record: Admitted on 2021 for respiratory failure and encephalopathy; patient was transferred from [facility] where he was being treated for pancreatitis and alcohol withdrawal prior to needing a higher level of care. Of note, patient completed a 5 day course of Unasyn/Augmentin for possible pneumonia on . Broad spectrum antibiotics are being initiated at this time for possible pneumonia.     Other antibiotics: Zosyn 4.5g iv q8h    Allergies: Patient has no known allergies.     List concerns for renal function: abnormal LFTs, BUN/SCr ratio > 20:1, obesity (38.56 kg/m²), low albumin, hypermetabolic state (SIRS), pressors/hypotension, concurrent Zosyn and active diuresis    Pertinent cultures to date:   ·  TA: UURF  ·  BCx: ngtd  ·  BAL: UURF  ·  BCx: ngtd  ·  Respiratory Cx: in process    MRSA nares swab if pneumonia is a concern (ordered/positive/negative/n-a): in process    Recent Labs     21  0455 21  0430 21  0258   WBC 15.5* 15.4* 18.9*   NEUTSPOLYS 86.10* 86.20* 84.90*     Recent Labs     21  0455 21  0430 21  0258   BUN 29* 34* 42*   CREATININE 0.40* 0.78 1.02   ALBUMIN 2.9* 2.9* 2.7*     No results for input(s): VANCOTROUGH, VANCOPEAK, VANCORANDOM in the last 72 hours.    Intake/Output Summary (Last 24 hours) at 2021 1130  Last data filed at 2021 0849  Gross per 24 hour   Intake 2419.57 ml   Output --   Net 2419.57 ml      BP (!) 93/54   Pulse (!) 107   Temp 37.9 °C (100.3 °F) (Temporal)   Resp (!) 22   Ht 1.778 m (5' 10\")   Wt 122 kg (268 lb 11.9 oz)   SpO2 96%  Temp (24hrs), Av.8 °C (100.1 °F), Min:37.2 °C (99 °F), Max:39.4 °C (103 °F)      A/P   1. Vancomycin dose: 2000 mg iv q12h (0900, 2100)  2. Next " vancomycin level: 1-2 days, not ordered  3. Goal trough: 15-20 mcg/mL  4. Comments: Patient with JEYSON, Scr elevated from what appears to be baseline, estimated CrCl > 100 mL/min, patient is voiding (amount not charted). Cultures in process. No prior dosing history. Will initiate vancomycin at 2000 mg iv q12h given current renal function and body habitus, and tentatively plan on checking a vancomycin trough prior to the 4th dose. Pharmacy will continue to follow.     Nellie MaddoxD

## 2021-04-26 NOTE — PROGRESS NOTES
Critical Care Progress Note    Date of admission  4/17/2021    Chief Complaint  Respiratory failure, encephalopathy    Hospital Course  40-year-old male with BMI 39 transferred from [facility] where he was admitted several days ago prior to admission for pancreatitis and alcohol abuse subsequently going into alcohol withdrawal syndrome.  He required multiple doses of Ativan and had a CIWA score of 25, worsening AMS, intubated and transferred here for further management. Post intubation, pt became hypotensive, started on NE gtt. Lipase 800, CT A/P with no evidence of complications of pancreatitis.     4/17 intubated at OSH  4/18 s/p bronched due to increased secretions.   4/19 extubated      Interval Problem Update  Reviewed last 24 hour events:  Neuro: oriented to self only  HR: SR  SBP: MAP 50s off pressors, NE 2  Tmax: 100.6  GI: TF at goal, 3BM today  I/O: curtis, try condom cath  Lines: TLC R IJ, PIV  Mobility: level 2  Resp: 4L oxymask, thick hemoptysis is new  Vte: lovenox  PPI/H2:n/a  Antibx: escalate to vanc/zosyn    Escalate abx to vanc/zosyn  MRSA nares  Blood, resp cultures  CTPA for hemoptysis  Asterixis on exam, add lactulose  D/c prednisolone, risks outweigh benefits currently  Spiking fevers, resp, blood cultures, UA  hemoptysis  D/c ativan  Add olanzapine  Increase free water flushes    Review of Systems  Review of Systems   Unable to perform ROS: Mental acuity        Vital Signs for last 24 hours   Temp:  [37.2 °C (99 °F)-39.4 °C (103 °F)] 37.3 °C (99.1 °F)  Pulse:  [] 100  Resp:  [16-37] 27  BP: ()/(31-68) 111/61  SpO2:  [87 %-100 %] 92 %    Hemodynamic parameters for last 24 hours       Respiratory Information for the last 24 hours       Physical Exam   Physical Exam  Constitutional:       Appearance: He is ill-appearing.   HENT:      Nose:      Comments: Cor track     Mouth/Throat:      Mouth: Mucous membranes are dry.      Comments: Very hypophonic  Eyes:      General:  Scleral icterus present.      Pupils: Pupils are equal, round, and reactive to light.   Cardiovascular:      Rate and Rhythm: Regular rhythm. Tachycardia present.      Pulses: Normal pulses.   Pulmonary:      Effort: No respiratory distress.      Breath sounds: Rhonchi present.   Abdominal:      General: Bowel sounds are normal. There is distension.      Tenderness: There is no abdominal tenderness.   Musculoskeletal:      Right lower leg: Edema present.      Left lower leg: Edema present.   Skin:     Coloration: Skin is jaundiced.   Neurological:      Comments: +asterixis    Following commands, extremely hypophonic so unsure if oriented.           Medications  Current Facility-Administered Medications   Medication Dose Route Frequency Provider Last Rate Last Admin   • acetaminophen (Tylenol) tablet 650 mg  650 mg Oral Q4HRS PRN Gadiel Felton M.D.   650 mg at 04/26/21 0513   • MD Alert...Vancomycin per Pharmacy   Other PHARMACY TO DOSE Marilyn Molina M.D.       • piperacillin-tazobactam (ZOSYN) 4.5 g in  mL IVPB  4.5 g Intravenous Once Marilyn Molina M.D.        And   • piperacillin-tazobactam (ZOSYN) 4.5 g in  mL IVPB  4.5 g Intravenous Q8HRS Marilyn Molina M.D.       • vancomycin (VANCOCIN) 3,000 mg in  mL IVPB  25 mg/kg Intravenous Once Marilyn Molina M.D.       • lactulose 20 GM/30ML solution 30 mL  30 mL Oral TID Marilyn Molina M.D.       • furosemide (LASIX) tablet 20 mg  20 mg Oral Q DAY Marilyn Molina M.D.       • spironolactone (ALDACTONE) tablet 50 mg  50 mg Oral Q DAY Marilyn Molina M.D.       • LORazepam (ATIVAN) injection 0.5-2 mg  0.5-2 mg Intravenous Q2HRS PRN Eleuterio Treviño M.D.   2 mg at 04/26/21 0232   • midodrine (PROAMATINE) tablet 10 mg  10 mg Oral TID WITH MEALS Miky Gama D.OIsiah   10 mg at 04/26/21 0729   • haloperidol lactate (HALDOL) injection 2-5 mg  2-5 mg Intravenous Q4HRS PRN Eleuterio Treviño M.D.   5 mg at 04/25/21 2004   • potassium bicarbonate  (KLYTE) effervescent tablet 50 mEq  50 mEq Enteral Tube DAILY CIERA BradfordOIsiah   50 mEq at 04/26/21 0513   • dexmedetomidine (PRECEDEX) 400 mcg/100mL NS premix infusion  0.1-1.5 mcg/kg/hr Intravenous Continuous Miky Gama D.O. 18.8 mL/hr at 04/26/21 0232 0.6 mcg/kg/hr at 04/26/21 0232   • albuterol (PROVENTIL) 2.5mg/0.5ml nebulizer solution 2.5 mg  2.5 mg Nebulization Q4H PRN (RT) Evin Hammer M.D.   2.5 mg at 04/20/21 0758   • Respiratory Therapy Consult   Nebulization Continuous RT Jeremy M Gonda, M.D.       • MD Alert...ICU Electrolyte Replacement per Pharmacy   Other PHARMACY TO DOSE Jeremy M Gonda, M.D.       • Pharmacy Consult: Enteral tube insertion - review meds/change route/product selection  1 Each Other PHARMACY TO DOSE Jeremy M Gonda, M.D.       • norepinephrine (Levophed) infusion 8 mg/250 mL (premix)  0-30 mcg/min Intravenous Continuous Jeremy M Gonda, M.D. 5.6 mL/hr at 04/26/21 0400 3 mcg/min at 04/26/21 0400   • thiamine (Vitamin B-1) tablet 100 mg  100 mg Enteral Tube DAILY Radha Beckett M.D.   100 mg at 04/26/21 0513   • folic acid (FOLVITE) tablet 1 mg  1 mg Enteral Tube DAILY Radha Beckett M.D.   1 mg at 04/26/21 0513   • enoxaparin (LOVENOX) inj 40 mg  40 mg Subcutaneous Q12HRS Jeremy M Gonda, M.D.   40 mg at 04/26/21 0513       Fluids    Intake/Output Summary (Last 24 hours) at 4/26/2021 0743  Last data filed at 4/26/2021 0600  Gross per 24 hour   Intake 2149.57 ml   Output --   Net 2149.57 ml       Laboratory          Recent Labs     04/24/21  0455 04/25/21  0430 04/26/21  0258   SODIUM 152* 147* 153*   POTASSIUM 3.2* 3.4* 3.4*   CHLORIDE 111 109 116*   CO2 31 29 31   BUN 29* 34* 42*   CREATININE 0.40* 0.78 1.02   CALCIUM 9.1 8.7 8.2*     Recent Labs     04/24/21  0455 04/25/21  0430 04/26/21  0258   ALTSGPT 71* 77* 73*   ASTSGOT 243* 233* 184*   ALKPHOSPHAT 148* 140* 123*   TBILIRUBIN 13.9* 13.2* 14.0*   GLUCOSE 177* 205* 240*     Recent Labs     04/24/21  0455  04/25/21  0430 04/26/21  0258   WBC 15.5* 15.4* 18.9*   NEUTSPOLYS 86.10* 86.20* 84.90*   LYMPHOCYTES 7.30* 7.60* 9.00*   MONOCYTES 5.20 5.10 3.90   EOSINOPHILS 0.00 0.00 0.10   BASOPHILS 0.10 0.10 0.30   ASTSGOT 243* 233* 184*   ALTSGPT 71* 77* 73*   ALKPHOSPHAT 148* 140* 123*   TBILIRUBIN 13.9* 13.2* 14.0*     Recent Labs     04/24/21  0455 04/25/21  0430 04/26/21  0258   RBC 2.80* 2.75* 2.58*   HEMOGLOBIN 9.3* 9.2* 8.8*   HEMATOCRIT 30.3* 29.9* 28.5*   PLATELETCT 502* 504* 513*       Imaging  X-Ray:  I have personally reviewed the images and compared with prior images.    Assessment/Plan  * Acute encephalopathy  Assessment & Plan  Was initially d/t EtOH withdrawal  Now likely metabolic d/t liver disease, toxic d/t benzos and steroids and ICU delirium  Asterixis on exam  Add back lactulose  D/c benzos  Add olanzapine for agitated delirium  Cont precedex infusion for agitated delirium  Delirium precautions    Septic shock (HCC)- (present on admission)  Assessment & Plan  Unclear source  Ongoing fevers with rising WBC  Ongoing pressors need in addition to midodrine      Escalate antibiotics to cover resistant organisms as he has been inpatient for 9d  Resp, blood cultures  MRSA swab  Consider precedex as additional cause of hypotension/fever-add antipsychotic to try and titrate off  Levophed MAP >65      Acute respiratory failure with hypoxia (HCC)- (present on admission)  Assessment & Plan  Intubated at outside hospital for aspiration and hypoxia  4/18 s/p bronch due to increased secretions.   4/20 Extubated   Hypoxia likely d/t aspiration pneumonitis vs PNA    Low flow O2  Escalate antibiotics to cover resistant organisms-->vanc, zosyn  Check MRSA swab, if negative d/c vanco  Aspiration precautions  resp culture  CTPA given new hemoptysis    Alcoholic hepatitis- (present on admission)  Assessment & Plan  MELD score is 29, indicating 19.6% 3 month mortality  D/c prednisolone as the data supporting use in acute  alcoholic hepatitis is not robust, and I feel the risks of steroids (delirium, weakness, infections) outweigh the benefits at this time      Hypernatremia- (present on admission)  Assessment & Plan  Increase free water flushes  May need to escalate further with addition of lactulose    Aspiration pneumonitis (HCC)  Assessment & Plan  Completed 5d augmentin  now ongoing fever worsening cough and hemoptysis, escalating antibiotics to resistant organisms    Pancreatitis- (present on admission)  Assessment & Plan  Questionable ?lipase at the outside hospital was 800, however CT abdomen showed no signs of acute pancreatitis   Possibly triggered by alcohol   Repeat lipase here was 412  Monitor    Coagulopathy (HCC)  Assessment & Plan  Due to liver disease  Replete vitamin K    Thrombocytosis (HCC)  Assessment & Plan  Possibly brewing infection  Trend    Anemia  Assessment & Plan  Macrocytic, probably d/t liver disease and EtOH    Hypophonia with hoarseness  Assessment & Plan  Voice should have improved at this point post-extubation  Cont SLP  Will consider ENT eval in the coming days    Goals of care, counseling/discussion  Assessment & Plan  4/19 discussed with Byron, brother and sister in law on the phone and updated pt's critical condition. Mother is coming tonight. All questions were answered  4/20 mother was at bedside and I updated her on patient's condition. All questions were answered  4/22 mother was at bedside and I updated her on patient's condition. All questions were answered  4/23 had a long discussion with brother and mother to update about patient's critical condition.    Hypophosphatemia- (present on admission)  Assessment & Plan  Severe  Continue to agressively replete    Alcohol withdrawal (HCC)- (present on admission)  Assessment & Plan  Out of withdrawal window  Cont vitamins    Alcohol abuse- (present on admission)  Assessment & Plan  Since the age of 16 years as per the documentation  Pt is  actively drinking. 12packs of beer daily and a pint of hard liquor daily.    Last drink was before admission   Needs alcohol cessation counseling and resources on discharge       VTE:  Lovenox  Ulcer: Not Indicated  Lines: Central Line  Ongoing indication addressed and Sotomayor Catheter  Ongoing indication addressed    I have performed a physical exam and reviewed and updated ROS and Plan today (4/26/2021). In review of yesterday's note (4/25/2021), there are no changes except as documented above.     Discussed patient condition and risk of morbidity and/or mortality with RN, RT, Pharmacy, Charge nurse / hot rounds and Patient     The patient remains critically ill on precedex infusion.  Critical care time = 60 minutes in directly providing and coordinating critical care and extensive data review.  No time overlap and excludes procedures.

## 2021-04-26 NOTE — DISCHARGE PLANNING
Care Transition Team Discharge Planning    Anticipated Discharge Disposition: TBD    Action: Per AM rounds, pt is calling out and banging on side of bed during rounds. Pt is on an oxy mask. RT suctioned out thick secretions. r     Barriers to Discharge: medical clearance/stability    Plan: Lsw will continue to follow, attend rounds for medical updates, and assist w/ d/c planning.

## 2021-04-26 NOTE — ASSESSMENT & PLAN NOTE
Macrocytic, probably d/t liver disease and EtOH  Required transfusions for acute blood loss anemia d/t epistaxis

## 2021-04-26 NOTE — CARE PLAN
Problem: Safety - Medical Restraint  Goal: Remains free of injury from restraints (Restraint for Interference with Medical Device)  Description: INTERVENTIONS:  1. Determine that other, less restrictive measures have been tried or would not be effective before applying the restraint  2. Evaluate the patient's condition at the time of restraint application  3. Inform patient/family regarding the reason for restraint  4. Q2H: Monitor safety, psychosocial status, comfort, nutrition and hydration  Outcome: PROGRESSING AS EXPECTED     Problem: Pain Management  Goal: Pain level will decrease to patient's comfort goal  Outcome: PROGRESSING AS EXPECTED

## 2021-04-26 NOTE — CARE PLAN
Problem: Safety - Medical Restraint  Goal: Remains free of injury from restraints (Restraint for Interference with Medical Device)  Description: INTERVENTIONS:  1. Determine that other, less restrictive measures have been tried or would not be effective before applying the restraint  2. Evaluate the patient's condition at the time of restraint application  3. Inform patient/family regarding the reason for restraint  4. Q2H: Monitor safety, psychosocial status, comfort, nutrition and hydration  Outcome: PROGRESSING AS EXPECTED     Problem: Pain Management  Goal: Pain level will decrease to patient's comfort goal  Outcome: PROGRESSING AS EXPECTED     Problem: Respiratory:  Goal: Respiratory status will improve  Outcome: PROGRESSING SLOWER THAN EXPECTED     Problem: Skin Integrity  Goal: Risk for impaired skin integrity will decrease  Outcome: PROGRESSING SLOWER THAN EXPECTED

## 2021-04-26 NOTE — ASSESSMENT & PLAN NOTE
Was initially d/t EtOH withdrawal  Now likely metabolic d/t liver disease, toxic d/t benzos and steroids and ICU delirium  Hold lactulose for diarrhea and hypernatremia  olanzapine for agitated delirium  Sedation with propofol/fent  Delirium precautions

## 2021-04-27 PROBLEM — R14.0 ABDOMINAL DISTENSION: Status: ACTIVE | Noted: 2021-01-01

## 2021-04-27 PROBLEM — R04.2 HEMOPTYSIS: Status: ACTIVE | Noted: 2021-01-01

## 2021-04-27 PROBLEM — K70.30 ALCOHOLIC CIRRHOSIS (HCC): Status: ACTIVE | Noted: 2021-01-01

## 2021-04-27 NOTE — CARE PLAN
Problem: Communication  Goal: The ability to communicate needs accurately and effectively will improve  Outcome: PROGRESSING SLOWER THAN EXPECTED     Problem: Safety  Goal: Will remain free from injury  Outcome: PROGRESSING SLOWER THAN EXPECTED  Goal: Will remain free from falls  Outcome: PROGRESSING SLOWER THAN EXPECTED     Problem: Infection  Goal: Will remain free from infection  Outcome: PROGRESSING SLOWER THAN EXPECTED     Problem: Venous Thromboembolism (VTW)/Deep Vein Thrombosis (DVT) Prevention:  Goal: Patient will participate in Venous Thrombosis (VTE)/Deep Vein Thrombosis (DVT)Prevention Measures  Outcome: PROGRESSING SLOWER THAN EXPECTED     Problem: Bowel/Gastric:  Goal: Normal bowel function is maintained or improved  Outcome: PROGRESSING SLOWER THAN EXPECTED  Goal: Will not experience complications related to bowel motility  Outcome: PROGRESSING SLOWER THAN EXPECTED     Problem: Knowledge Deficit  Goal: Knowledge of disease process/condition, treatment plan, diagnostic tests, and medications will improve  Outcome: PROGRESSING SLOWER THAN EXPECTED  Goal: Knowledge of the prescribed therapeutic regimen will improve  Outcome: PROGRESSING SLOWER THAN EXPECTED     Problem: Discharge Barriers/Planning  Goal: Patient's continuum of care needs will be met  Outcome: PROGRESSING SLOWER THAN EXPECTED     Problem: Fluid Volume:  Goal: Will maintain balanced intake and output  Outcome: PROGRESSING SLOWER THAN EXPECTED     Problem: Respiratory:  Goal: Respiratory status will improve  Outcome: PROGRESSING SLOWER THAN EXPECTED     Problem: Skin Integrity  Goal: Risk for impaired skin integrity will decrease  Outcome: PROGRESSING SLOWER THAN EXPECTED     Problem: Safety - Medical Restraint  Goal: Remains free of injury from restraints (Restraint for Interference with Medical Device)  Description: INTERVENTIONS:  1. Determine that other, less restrictive measures have been tried or would not be effective before  applying the restraint  2. Evaluate the patient's condition at the time of restraint application  3. Inform patient/family regarding the reason for restraint  4. Q2H: Monitor safety, psychosocial status, comfort, nutrition and hydration  Outcome: PROGRESSING SLOWER THAN EXPECTED  Goal: Free from restraint(s) (Restraint for Interference with Medical Device)  Description: INTERVENTIONS:  1. ONCE/SHIFT or MINIMUM Q12H: Assess and document the continuing need for restraints  2. Q24H: Continued use of restraint requires LIP to perform face to face examination and written order  3. Identify and implement measures to help patient regain control  Outcome: PROGRESSING SLOWER THAN EXPECTED     Problem: Pain Management  Goal: Pain level will decrease to patient's comfort goal  Outcome: PROGRESSING SLOWER THAN EXPECTED     Problem: Psychosocial Needs:  Goal: Level of anxiety will decrease  Outcome: PROGRESSING SLOWER THAN EXPECTED     Problem: Urinary Elimination:  Goal: Ability to reestablish a normal urinary elimination pattern will improve  Outcome: PROGRESSING SLOWER THAN EXPECTED

## 2021-04-27 NOTE — DIETARY
"Nutrition support weekly update:  Day 10 of admit.  Irving Ivory is a 40 y.o. male with admitting DX of ETOH withdrawal w/ delirium.  Tube feeding initiated on . Current TF via gastric Cortrak is Diabetisource AC @ 75 mL/hr, providing 2160 kcal, 108 gm protein, and 1476 mL of free water per day.    Pt with worsening respiratory failure overnight with prolonged hypoxia d/t inability to clear bloody secretions.  +Confusion.  Pt required reintubation (previously intubated on admit and extubated ).  Will reassess nutritional needs 2/2 being on the vent and BMI >30.     Assessment:  Wt trends this admit reviewed - suspect wt decrease r/t previous hypocaloric feeds (anticipate wt loss) and fluid status - per I/Os, pt is -6.5L.      Estimated Nutritional Needs based on:   Height: 177.8 cm (5' 10\")  Weight: 114 kg (251 lb 1.7 oz)  Weight to Use in Calculations: 114 kg (251 lb 1.7 oz) - via bed scale.   Ideal Body Weight: 75.5 kg (166 lb 7.2 oz)  Body mass index is 36.03 kg/m²., BMI classification: Obese Class II.     Calculation/Equation: MSJ x 1 = 2047 kcal (65-70%: 1331 - 1433).  PSU = 2686 kcal (65-70%: 1746 - 1880) (VE: 13.8, Tmax over the past 24 hours: 39.0).   Total Calories / day: 1253 - 1595 (Calories / k - 14 actual body wt)  Total Grams Protein / day: 151+ (Grams Protein / k.0+ IBW)    Evaluation:   1. Current clinical picture and MD progress notes reviewed in addition to current problems/PMHx.   2. No new staged wounds noted per review of the EMR.  Pt noted with 1+ general edema, 1+ edema to BUE and 2+ pitting edema to BLE.   3. Labs pending for this AM.  : Sodium: 158, Glucose: 169, BUN: 31, Phosphorus: 2.3, Magnesium: 2.9.  4. Meds: Pepcid, Folvite, Lasix, SSI, Lactulose, Electrolyte replacement, Midodrine, Aldactone, Thiamine, Fentanyl.  5. Levophed running @ 5 mcg/min and Precedex running.  Propofol infusion not needed @ this time per MAR.    6. LBM:  - loose, brown.  "   7. Peptamen Intense VHP is an appropriate formula to hypocalorically feed pt per [employer]M Guidelines.      Malnutrition risk: At risk 2/2 ETOH abuse and critical illness.     Recommendations/Plan:  1. Adjust TF formula and final goal rate to Peptamen Intense VHP @ 65 mL/hr, providing 1560 kcal, 144 gm protein, 119 gm CHO, and 1310 mL of free water per day.  2. Fluids per MD.  3. Continue to monitor wt.  4. RD continues to monitor labs daily.    RD follows.

## 2021-04-27 NOTE — PROGRESS NOTES
Pt sating 84% with wet breath sounds. NT and oral suction completed with laury red blood and large formed clots. MD updated. Pt to be intubated at this time.

## 2021-04-27 NOTE — CARE PLAN
Problem: Safety - Medical Restraint  Goal: Remains free of injury from restraints (Restraint for Interference with Medical Device)  Description: INTERVENTIONS:  1. Determine that other, less restrictive measures have been tried or would not be effective before applying the restraint  2. Evaluate the patient's condition at the time of restraint application  3. Inform patient/family regarding the reason for restraint  4. Q2H: Monitor safety, psychosocial status, comfort, nutrition and hydration  Outcome: PROGRESSING AS EXPECTED  Flowsheets (Taken 4/26/2021 8810)  Addressed this shift: Remains free of injury from restraints (restraint for interference with medical device):   Determine that other, less restrictive measures have been tried or would not be effective before applying the restraint   Evaluate the patient's condition at the time of restraint application   Inform patient/family regarding the reason for restraint   Every 2 hours: Monitor safety, psychosocial status, comfort, nutrition and hydration  Note: During range of motion, pt grabbed Cortrak.     Problem: Venous Thromboembolism (VTW)/Deep Vein Thrombosis (DVT) Prevention:  Goal: Patient will participate in Venous Thrombosis (VTE)/Deep Vein Thrombosis (DVT)Prevention Measures  Outcome: PROGRESSING SLOWER THAN EXPECTED  Note: Pt actively bleeding from mouth, Lovenox held.  SCDs in use bilaterally     Problem: Bowel/Gastric:  Goal: Normal bowel function is maintained or improved  Outcome: PROGRESSING SLOWER THAN EXPECTED  Note: BMS in place, actively draining.     Problem: Urinary Elimination:  Goal: Ability to reestablish a normal urinary elimination pattern will improve  Outcome: PROGRESSING SLOWER THAN EXPECTED  Note: Sotomayor catheter reinserted following intubation.  Urine icteric     Problem: Venous Thromboembolism (VTW)/Deep Vein Thrombosis (DVT) Prevention:  Goal: Patient will participate in Venous Thrombosis (VTE)/Deep Vein Thrombosis (DVT)Prevention  Measures  Outcome: PROGRESSING SLOWER THAN EXPECTED  Note: Pt actively bleeding from mouth, Lovenox held.  SCDs in use bilaterally     Problem: Bowel/Gastric:  Goal: Normal bowel function is maintained or improved  Outcome: PROGRESSING SLOWER THAN EXPECTED  Note: BMS in place, actively draining.     Problem: Urinary Elimination:  Goal: Ability to reestablish a normal urinary elimination pattern will improve  Outcome: PROGRESSING SLOWER THAN EXPECTED  Note: Sotomayor catheter reinserted following intubation.  Urine icteric

## 2021-04-27 NOTE — PROGRESS NOTES
Pulmonary/Critical Care Medicine   Progress Note    Date of service: 4/26/2021  Time: 1810    Called to bedside by RN for worsening respiratory failure with prolonged hypoxia  due to inability to clear bloody secretions.  On my arrival at bedside the patient is confused and attempting to cough however unable to clear bloody mucus from his glottis.  Source of blood is unclear, he does have a core track in place however no blood is present in the anterior nares.  Due to the patient's inability to protect his airway and ongoing bleeding resulting in recurrent, more frequent and progressively more severe hypoxic events the patient was intubated for airway protection.  On video laryngoscopy no clear source of the bleeding was localized however all oral pharyngeal tissues were coated in dried blood.  A TEG will be sent and the ventilator order set will be reordered.    I spent 25 min in reviewing the patient's condition, physical examination, laboratory and imaging data, prior documentation, in discussion with RN, RT, patient, and in formulating an assessment/plan.    Critical Care time: 25 min. No time overlap, procedures not included in time.  [id number]

## 2021-04-27 NOTE — PROCEDURES
Intubation    Date/Time: 4/26/2021 8:10 PM  Performed by: Terrance Pierce Jr., D.O.  Authorized by: Terrance Pierce Jr., D.O.     Consent:     Consent obtained:  Emergent situation  Pre-procedure details:     Pretreatment meds: etomidate.    Paralytics:  Rocuronium  Procedure details:     CPR in progress: no      Laryngoscope type:  GlideScope    Laryngoscope blade:  Mac 3    Cormack-Lehane Classification:  Grade 1    Tube size (mm):  8.0    Tube type:  Cuffed    Number of attempts:  1    Tube visualized through cords: yes    Placement assessment:     Tube secured with:  ETT sweeney    Breath sounds:  Equal    Placement verification: chest rise, condensation, CXR verification, direct visualization, equal breath sounds and ETCO2 detector      CXR findings:  ETT in proper place  Post-procedure details:     Patient tolerance of procedure:  Tolerated well, no immediate complications

## 2021-04-27 NOTE — ASSESSMENT & PLAN NOTE
MELD score is 29, indicating 19.6% 3 month mortality  Not a current transplant candidate due to active EtOH abuse

## 2021-04-27 NOTE — THERAPY
Missed Therapy     Patient Name: Irving Ivory  Age:  40 y.o., Sex:  male  Medical Record #: [id number]  Today's Date: 4/27/2021    Discussed missed therapy with Nsg       04/27/21 1157   Interdisciplinary Plan of Care Collaboration   Collaboration Comments OT tx attempted, pt was re-intubated last night & is sedated.  Nsg advised to hold OT tx today.  Will continue to monitor pt for appropriateness of OT tx.

## 2021-04-27 NOTE — ASSESSMENT & PLAN NOTE
Pt was intubated for failure to manage these secretions  No significant blood on bronch  Source of this is was epistaxis

## 2021-04-27 NOTE — RESPIRATORY CARE
Ventilator Daily Summary    Vent Day #2    8.0 ETT @ 26    Ventilator settings changed this shift: Mode changed from APVcmv to ASV    %  PEEP 8  FiO2 50%    Weaning trials: SBT x2    Respiratory Procedures: Bronch with BAL with MD Molina    Plan: Continue current ventilator settings and wean mechanical ventilation as tolerated per physician orders.

## 2021-04-27 NOTE — THERAPY
Missed Therapy     Patient Name: Irving Ivory  Age:  40 y.o., Sex:  male  Medical Record #: [id number]  Today's Date: 4/27/2021 04/27/21 1211   Treatment Variance   Reason For Missed Therapy Medical - Patient Is Not Medically Stable   Interdisciplinary Plan of Care Collaboration   Collaboration Comments Patient now intubated.  SLP will hold and monitor for new orders as the patient is appropriate.

## 2021-04-27 NOTE — PROGRESS NOTES
Critical Care Progress Note    Date of admission  4/17/2021    Chief Complaint  Respiratory failure, encephalopathy    Hospital Course  40-year-old male with BMI 39 transferred from [facility] where he was admitted several days ago prior to admission for pancreatitis and alcohol abuse subsequently going into alcohol withdrawal syndrome.  He required multiple doses of Ativan and had a CIWA score of 25, worsening AMS, intubated and transferred here for further management. Post intubation, pt became hypotensive, started on NE gtt. Lipase 800, CT A/P with no evidence of complications of pancreatitis.     4/17 intubated at OSH  4/18 s/p bronched due to increased secretions.   4/19 extubated    4/26: low dose pressors, hemoptysis      Interval Problem Update  Reviewed last 24 hour events:  Neuro: following commands on vent. Change to propofol from precedex given fever  HR: SR  SBP: levophed 5  Tmax: 103  GI: TF at goal, ongoing diarrhea  I/O: curtis  Lines: TLC  Mobility: not able d/t agitation  Resp: VD 2, %, 8/50%. Was intolerant of more controlled mode.  Spont for about 1 hour. 1L FVC. NIF -43.  RSBI 48.  Ongoing thick blood secretions   Vte: d/c lovenox  PPI/H2:pepcid  Antibx: zosyn d2.  All cultures NGTD    Intubated overnight for failure to manage thick secretions, ongoing hemptysis  Ongoing bloody secretions  D/c lovenox  INR high  Replete vitamin K  Bronch today  Increasing fever  Send C. Diff  Monitor hyperNa q6  Trial ASV  Consider line exchange tomorrow if infectious workup otherwise negative  Switch to propofol from precedex    Review of Systems  Review of Systems   Unable to perform ROS: Intubated        Vital Signs for last 24 hours   Temp:  [37.7 °C (99.8 °F)-39.8 °C (103.6 °F)] 39 °C (102.2 °F)  Pulse:  [] 81  Resp:  [17-38] 18  BP: ()/(32-66) 103/45  SpO2:  [87 %-100 %] 96 %    Hemodynamic parameters for last 24 hours       Respiratory Information for the last 24  hours  Vent Mode: ASV  Rate (breaths/min): 30  Vt Target (mL): 420  PEEP/CPAP: 8  P Support: 5  MAP: 10  Control VTE (exp VT): 413    Physical Exam   Physical Exam  Constitutional:       Appearance: He is ill-appearing.      Interventions: He is sedated and intubated.   HENT:      Nose:      Comments: Cor track     Mouth/Throat:      Mouth: Mucous membranes are dry.      Comments: Blood coated teeth and mucous membranes  Eyes:      General: Scleral icterus present.      Pupils: Pupils are equal, round, and reactive to light.   Cardiovascular:      Rate and Rhythm: Regular rhythm. Tachycardia present.      Pulses: Normal pulses.   Pulmonary:      Effort: No respiratory distress. He is intubated.      Breath sounds: Rhonchi present.   Abdominal:      General: Bowel sounds are normal. There is distension.      Tenderness: There is no abdominal tenderness.   Musculoskeletal:      Right lower leg: Edema present.      Left lower leg: Edema present.   Skin:     Coloration: Skin is jaundiced.   Neurological:      Comments: Following commands even on max precedex and fentanyl   Psychiatric:      Comments: Unable to assess         Medications  Current Facility-Administered Medications   Medication Dose Route Frequency Provider Last Rate Last Admin   • Pharmacy Consult Request  1 Each Other PHARMACY TO DOSE Marilyn Molina M.D.       • ketamine (KETALAR) 50 mg/ml injection  100 mg Intravenous Once Marilyn Molina M.D.       • piperacillin-tazobactam (ZOSYN) 4.5 g in  mL IVPB  4.5 g Intravenous Q8HRS Marilyn Molina M.D.   Stopped at 04/27/21 0829   • OLANZapine (ZYPREXA) tablet 5 mg  5 mg Enteral Tube Q EVENING Marilyn Molina M.D.   5 mg at 04/26/21 1707   • insulin regular (HumuLIN R,NovoLIN R) injection  1-6 Units Subcutaneous Q6HRS Marilyn Molina M.D.   Stopped at 04/27/21 0600    And   • glucose 4 g chewable tablet 16 g  16 g Oral Q15 MIN PRN Marilyn Molina M.D.        And   • dextrose 50% (D50W) injection 50 mL   50 mL Intravenous Q15 MIN PRN Marilyn Molina M.D.       • acetaminophen (Tylenol) tablet 650 mg  650 mg Enteral Tube Q4HRS PRN Marilyn Molina M.D.   650 mg at 04/27/21 0118   • midodrine (PROAMATINE) tablet 10 mg  10 mg Enteral Tube TID WITH MEALS Marilyn Molina M.D.   10 mg at 04/27/21 0816   • phytonadione (MEPHYTON) tablet 10 mg  10 mg Oral DAILY Marilyn Molina M.D.   10 mg at 04/27/21 0520   • Respiratory Therapy Consult   Nebulization Continuous RT Terrance Pierce Jr. D.O.       • famotidine (PEPCID) tablet 20 mg  20 mg Enteral Tube Q12HRS Terrance Pierce Jr. D.O.   20 mg at 04/27/21 0521    Or   • famotidine (PEPCID) injection 20 mg  20 mg Intravenous Q12HRS Terrance Pierce Jr. D.O.   20 mg at 04/26/21 2136   • MD Alert...ICU Electrolyte Replacement per Pharmacy   Other PHARMACY TO DOSE Terrance Pierce Jr., D.O.       • lidocaine (XYLOCAINE) 1 % injection 1-2 mL  1-2 mL Tracheal Tube Q30 MIN PRN SARIAH Basurto Jr..O.       • fentaNYL (SUBLIMAZE) injection 100 mcg  100 mcg Intravenous Q15 MIN PRN SARIAH Basurto Jr..O.   100 mcg at 04/27/21 0721    And   • fentaNYL (SUBLIMAZE) injection 200 mcg  200 mcg Intravenous Q15 MIN PRN Terrance Pierce Jr. D.O.        And   • fentaNYL (SUBLIMAZE) 50 mcg/mL in 50mL (Continuous Infusion)   Intravenous Continuous SARIAH Basurto Jr..O. 2 mL/hr at 04/27/21 0700 100 mcg/hr at 04/27/21 0700   • haloperidol lactate (HALDOL) injection 2-5 mg  2-5 mg Intravenous Q4HRS PRN Eleuterio Treviño M.D.   5 mg at 04/25/21 2004   • potassium bicarbonate (KLYTE) effervescent tablet 50 mEq  50 mEq Enteral Tube DAILY SARIAH Bradford.OIsiah   50 mEq at 04/27/21 0520   • dexmedetomidine (PRECEDEX) 400 mcg/100mL NS premix infusion  0.1-1.5 mcg/kg/hr Intravenous Continuous Miky Gama D.O. 28.1 mL/hr at 04/27/21 0715 0.9 mcg/kg/hr at 04/27/21 0715   • albuterol (PROVENTIL) 2.5mg/0.5ml nebulizer solution 2.5 mg  2.5 mg Nebulization Q4H PRN (RT) Evin Hammer M.D.   2.5 mg at  04/20/21 0758   • Pharmacy Consult: Enteral tube insertion - review meds/change route/product selection  1 Each Other PHARMACY TO DOSE Jeremy M Gonda, M.D.       • norepinephrine (Levophed) infusion 8 mg/250 mL (premix)  0-30 mcg/min Intravenous Continuous Jeremy M Gonda, M.D. 9.4 mL/hr at 04/27/21 0245 5 mcg/min at 04/27/21 0245   • thiamine (Vitamin B-1) tablet 100 mg  100 mg Enteral Tube DAILY Radha Beckett M.D.   100 mg at 04/27/21 0521   • folic acid (FOLVITE) tablet 1 mg  1 mg Enteral Tube DAILY Radha Beckett M.D.   1 mg at 04/27/21 0520       Fluids    Intake/Output Summary (Last 24 hours) at 4/27/2021 0846  Last data filed at 4/27/2021 0829  Gross per 24 hour   Intake 3554.69 ml   Output --   Net 3554.69 ml       Laboratory  Recent Labs     04/26/21  2113 04/27/21  0448   ISTATAPH 7.455 7.508*   ISTATAPCO2 43.9* 39.0*   ISTATAPO2 191* 111*   ISTATATCO2 32 32   LYNHYPS9KER 100* 99   ISTATARTHCO3 30.8* 31.0*   ISTATARTBE 6* 7*   ISTATTEMP 99.2 F 102.0 F   ISTATFIO2 100 60   ISTATSPEC Arterial Arterial   ISTATAPHTC 7.450 7.479   HAZBVPTI0AE 193* 124*         Recent Labs     04/26/21  0258 04/26/21  1850 04/27/21  0545   SODIUM 153* 158* 151*   POTASSIUM 3.4* 3.6 4.1   CHLORIDE 116* 119* 115*   CO2 31 31 28   BUN 42* 31* 46*   CREATININE 1.02 0.89 1.32   MAGNESIUM  --  2.9* 2.9*   PHOSPHORUS  --  2.3* 1.8*   CALCIUM 8.2* 8.1* 7.9*     Recent Labs     04/25/21  0430 04/25/21  0430 04/26/21  0258 04/26/21  0736 04/26/21  1850 04/27/21  0545   ALTSGPT 77*  --  73*  --   --  70*   ASTSGOT 233*  --  184*  --   --  176*   ALKPHOSPHAT 140*  --  123*  --   --  106*   TBILIRUBIN 13.2*  --  14.0*  --   --  14.0*   PREALBUMIN  --   --   --  15.1*  --   --    GLUCOSE 205*   < > 240*  --  169* 170*    < > = values in this interval not displayed.     Recent Labs     04/25/21  0430 04/26/21  0258 04/27/21  0545   WBC 15.4* 18.9* 19.9*   NEUTSPOLYS 86.20* 84.90* 75.60*   LYMPHOCYTES 7.60* 9.00* 15.60*   MONOCYTES  5.10 3.90 5.70   EOSINOPHILS 0.00 0.10 0.30   BASOPHILS 0.10 0.30 0.40   ASTSGOT 233* 184* 176*   ALTSGPT 77* 73* 70*   ALKPHOSPHAT 140* 123* 106*   TBILIRUBIN 13.2* 14.0* 14.0*     Recent Labs     04/25/21  0430 04/26/21  0258 04/26/21  0840 04/27/21  0545   RBC 2.75* 2.58*  --  2.53*   HEMOGLOBIN 9.2* 8.8*  --  8.5*   HEMATOCRIT 29.9* 28.5*  --  28.4*   PLATELETCT 504* 513*  --  488*   PROTHROMBTM  --   --  23.3* 23.3*   INR  --   --  2.00* 2.00*       Imaging  X-Ray:  I have personally reviewed the images and compared with prior images.    Assessment/Plan  * Acute encephalopathy  Assessment & Plan  Was initially d/t EtOH withdrawal  Now likely metabolic d/t liver disease, toxic d/t benzos and steroids and ICU delirium  Asterixis on exam  Hold lactulose for diarrhea and hypernatremia  olanzapine for agitated delirium  Delirium precautions    Hemoptysis  Assessment & Plan  Ongoing thick bloody secretions  Pt was intubated for failure to manage these secretions  D/c lovenox  Bronch without significant blood, so likely an upper airway source  No blood in anterior nares  If has continued bleeding, will discuss with ENT    Septic shock (HCC)- (present on admission)  Assessment & Plan  Unclear source  Ongoing fevers with rising WBC  Ongoing pressors need in addition to midodrine      Escalate antibiotics to cover resistant organisms as he has been inpatient for 9d  Resp, blood cultures pending  MRSA swab neg, UA neg, C. Diff neg, LFTs stable  Switch to propofol instead of precedex in case that is the cause of fever  Levophed MAP >65  Consider line switch/holiday tomorrow if still fevering    Acute respiratory failure with hypoxia (HCC)- (present on admission)  Assessment & Plan  Intubated at outside hospital for aspiration and hypoxia  4/18 s/p bronch due to increased secretions.   4/20 Extubated   reintubated 4/26 for thick, bloody secretions  Hypoxia likely d/t aspiration pneumonitis vs PNA    Lung protective  ventilation  All appropriate vent bundles  Will need decreased secretions to be able to extubate again    Alcoholic hepatitis- (present on admission)  Assessment & Plan  D/c prednisolone as the data supporting use in acute alcoholic hepatitis is not robust, and I feel the risks of steroids (delirium, weakness, infections) outweigh the benefits at this time      Hypernatremia- (present on admission)  Assessment & Plan  Increase free water flushes further  Add D5  Trend Q6  May need to escalate further with addition of lactulose    Aspiration pneumonitis (HCC)  Assessment & Plan  Completed 5d augmentin  now ongoing fever worsening cough and hemoptysis, escalating antibiotics to resistant organisms    Pancreatitis- (present on admission)  Assessment & Plan  Questionable ?lipase at the outside hospital was 800, however CT abdomen showed no signs of acute pancreatitis   Possibly triggered by alcohol   Repeat lipase here was 412  Monitor    Abdominal distension  Assessment & Plan  No ascites on POCUS    Alcoholic cirrhosis (HCC)  Assessment & Plan  MELD score is 29, indicating 19.6% 3 month mortality  Not a current transplant candidate due to active EtOH abuse    Coagulopathy (HCC)  Assessment & Plan  Due to liver disease  Replete vitamin K    Thrombocytosis (HCC)  Assessment & Plan  Possibly brewing infection  Trend    Anemia  Assessment & Plan  Macrocytic, probably d/t liver disease and EtOH    Hypophonia with hoarseness  Assessment & Plan  Re-evaluate once extubated    Goals of care, counseling/discussion  Assessment & Plan  4/19 discussed with Bryon, brother and sister in law on the phone and updated pt's critical condition. Mother is coming tonight. All questions were answered  4/20 mother was at bedside and I updated her on patient's condition. All questions were answered  4/22 mother was at bedside and I updated her on patient's condition. All questions were answered  4/23 had a long discussion with brother and  mother to update about patient's critical condition.    Hypophosphatemia- (present on admission)  Assessment & Plan  Severe  Continue to agressively replete    Alcohol withdrawal (HCC)- (present on admission)  Assessment & Plan  Out of withdrawal window  Cont vitamins    Alcohol abuse- (present on admission)  Assessment & Plan  Since the age of 16 years as per the documentation  Pt is actively drinking. 12packs of beer daily and a pint of hard liquor daily.    Last drink was before admission   Needs alcohol cessation counseling and resources on discharge       VTE:  Lovenox  Ulcer: Not Indicated  Lines: Central Line  Ongoing indication addressed and Sotomayor Catheter  Ongoing indication addressed    I have performed a physical exam and reviewed and updated ROS and Plan today (4/27/2021). In review of yesterday's note (4/26/2021), there are no changes except as documented above.     Discussed patient condition and risk of morbidity and/or mortality with RN, RT, Pharmacy, Charge nurse / hot rounds and Patient     The patient remains critically ill on ventilator.  Critical care time = 50 minutes in directly providing and coordinating critical care and extensive data review.  No time overlap and excludes procedures.

## 2021-04-27 NOTE — PROCEDURES
"Bronchoscopy procedure note    Date of Service: 4/27/2021    Procedure:  Diagnostic and therapeutic bronchoscopy with BAL    Indication: hemoptysis    Physician:  Dr. Elisa Molina MD    Post Procedure Diagnosis:  1.  Hemoptysis due to upper airway bleed  2. bronchomalacia    Narrative:  Appropriate consent was obtained and \"time out\" was performed.  A flexible fiberoptic bronchoscope was then inserted through the ETT without difficulty.  All airways were evaluated to the sub-segmental level.  The airway mucosa was normal with bronchomalacia.  No endobronchial lesions were seen.  Scant blood secretions were suctioned from the RLL and RML bronchi. There was no significant amount of blood or evidence of a pulmonary source of bleed. The bronchoscope was then wedged in a segment of the RML bronchus.  30cc of saline was instilled with moderate return of 10 BAL fluid.  The BAL specimen will be sent for appropriate culture.  The BAL fluid was clear with a few bloody mucous plugs.  No evidence of alveolar hemorrhage.  No immediate complications.  EBL = 0.      Marilyn Molina M.D.        "

## 2021-04-27 NOTE — DISCHARGE PLANNING
Care Transition Team Assessment  NOK brother and mom-see face sheet as they are both emergency contacts.    Lsw collected information from brothque Slater. Pt had moved from [place] to be near brother in [place]. After a short stay at Bucky's house pt moved out ot own apartment and had own job. Pt appears to have started drinking more d/t life stressors. Pt lost job, house, and now almost life. Pt was in his apartment, and had done nothing about the mice as he was drinking.     Pt will most likely d/c to mom's house in [place]. Pt had [employer] before and will have to switch back from [place] Medicaid to [employer].    LSw called pt's mom who Lsw saw at bedside during rounds this AM. Lsw left  indicating would happily provide information for [employer] and inpt/oupt rehab for alcohol.    Pt is fully independent w/ I/ADLs, has license but no car. Pt has no prescription meds and no PCP.     Pt might have started ETOH at 10yo. Pt has said he was depressed before. Pt has increased his drinking from 12 pack to 24 pack to 1 pint to 1.75 pint lately. Pt was told by MD recently he would pass away if he did not stop ETOH. Pt knew he had to go inpt to detox or he could die.      Information Source  Information Given By: Relative  Informant's Name: brother Bryon @ [phone]  Who is responsible for making decisions for patient? : Patient    Readmission Evaluation  Is this a readmission?: No    Elopement Risk  Legal Hold: No  Ambulatory or Self Mobile in Wheelchair: No-Not an Elopement Risk  Elopement Risk: Not at Risk for Elopement    Interdisciplinary Discharge Planning  Primary Care Physician: (been to ER a few times, no estab PCP)  Lives with - Patient's Self Care Capacity: Alone and Able to Care For Self  Support Systems: Family Member(s)  Housing / Facility: 1 Story Apartment / Condo  Do You Take your Prescribed Medications Regularly: (usually no presc meds)  Mobility Issues: No  Prior Services: Home-Independent  Durable Medical  Equipment: Not Applicable    Discharge Preparedness  What is your plan after discharge?: (mom may take pt home to her place in [place], [place])  What are your discharge supports?: Parent, Sibling  Prior Functional Level: Ambulatory, Independent with Activities of Daily Living    Functional Assesment  Prior Functional Level: Ambulatory, Independent with Activities of Daily Living    Finances  Prescription Coverage: Yes([place] from [place], [place]                        Psychological Assessment  History of Substance Abuse: Alcohol  Date Last Used - Alcohol: (strt aprox age 11, inc d/t life stress/dep, upto 1-1.75pint )  Substance Abuse Comments: (prior to admission MD said stop ETOH or will die)  History of Psychiatric Problems: (told brother Bryon he was depressed, die if detox at home )  Non-compliant with Treatment: (never tried detox or rehab etoh)    Discharge Risks or Barriers  Discharge risks or barriers?: No PCP, Substance abuse, Homeless / couch surfing    Anticipated Discharge Information  Discharge Disposition: Still a Patient (28)  Discharge Address: lost apt listed on facesheet  Discharge Contact Phone Number: milagros bennett at bedside @ [phone]

## 2021-04-27 NOTE — THERAPY
Physical Therapy Contact Note    PT treatment attempted; patient is intubated and sedated. Will continue to monitor for appropriateness of PT intervention and re attempt as able.    Milagros Andrade, PT, DPT  [phone]

## 2021-04-27 NOTE — PROGRESS NOTES
Pt resting calmly with only Precedex running. Propofol returned to ADS.  Pt's mother and brother, Wander have been informed of recent intubation. Questions answered.

## 2021-04-28 NOTE — PROCEDURES
Procedure Note    Date: 4/27/2021  Time: 1600    Procedure: Flexible fiberoptic nasopharyngeal laryngoscopy    Indication: Nasopharyngeal and oropharyngeal hemorrhage without clear source    Procedure: A time-out was performed. Nursing at bedside throughout procedure. Patient provided sedation and analgesia throughout the procedure with previously ordered propofol and fentanyl for ventilator sedation.  Patient on full ventilator support throughout the procedure.  Bilateral nares were prepped with 2% lidocaine with epinephrine to provide adequate topical anesthesia and vasoconstriction.  The right nare has a core track in place therefore the left nare was chosen for procedure.  Using a 3.8 mm fiberoptic bronchoscope, the left nare was entered. Upon entering the anterior nasal fossa along the floor of the nose multiple thrombi were noted inferior and lateral to the inferior turbinate, these were extracted using Lock suction and the anterior nasal fossa was evaluated revealing no source of bleeding.  The left posterior nasal fossa was entered and a pool of blood was noted at the level of the salpingo-pharyngeal fold filling both the ostium pharyngeum and Fossa of Rosenmuller.  Source of bleeding appeared to be the posterior/inferior nasal septum adjacent to the cortrak bridle.  The posterior nasal fossa was cleared of blood and the posterior oropharyngeal fossa was entered and large amounts of new as well as old blood was encountered and no additional site of bleeding was noted.  ET tube noted between the vocal cords and the periglottic structures also did not reveal any source of hemorrhage.    Findings: Posterior nasopharyngeal bleeding, likely source is the posterior septum from NGT bridle. The bridle will be removed, TEG reviewed from last night and found to be without coagulopathy.  If hemorrhage does not slow posterior nasal packing will be considered.    Complications: none    Terrance Pierce, DO  Critical Care  Medicine

## 2021-04-28 NOTE — CONSULTS
[facility] INFECTIOUS DISEASES INPATIENT CONSULT NOTE     Date of Service: 4/28/2021    Consult Requested By: Marilyn Molina M.D.    Reason for Consultation: Shock, fevers    Chief Complaint: Alcohol  withdrawal    History of Present Illness:     Irving Ivory is a 40 y.o. male admitted 4/17/2021.  History obtained from documentation.  This is a 40-year-old patient with morbid obesity, alcoholism, transferred from [facility] where he presented for alcohol abuse, reported alcoholic pancreatitis, subsequently going into alcohol withdrawal syndrome, complicated by worsening AMS, high CIWA score, requiring intubation for airway protection.  After intubation, patient required norepinephrine infusion, was transferred to [facility] on 4/17.  He was extubated on 4/19.    Patient continued to have episodes of fevers along with persistent and fluctuating leukocytosis throughout admission.  Patient received Augmentin through 4/21, then discontinued.    On 4/25, patient had high-grade fever of 103, leukocytosis began to trend up.  Another episode of 103.6 on 4/26, also began to develop hemoptysis versus epistaxis.  White count continue to trend up, 27.8.  Patient was started on Unasyn on 4/25, brought into Zosyn on 4/26, received 1 dose of vancomycin on 4/26 as well.  Patient is on 5 of norepinephrine and midodrine. Bronchoscopy from 4/27 with BAL cultures no growth till date, no bleeding noted.  Blood cultures x2 from 4/24 and 4/26 no growth till date, sputum cultures negative.  C. difficile PCR negative.  Procalcitonin mildly elevated with patient also with JEYSON.  Total bilirubin of 14.2.    Chest x-ray with bilateral interstitial and airspace opacities, stable.  CTA from 4/26 with no PE, mild nonspecific patchy nodular groundglass opacities in upper lobes.  Atelectatic changes.  On the morning of 4/28, patient underwent TXA saturated bilateral posterior nasal packings for epistaxis from posterior nasal septum.    Review  of Systems:  Unable to obtain as patient is intubated    Past Medical History:   Diagnosis Date   • Alcohol abuse        No past surgical history on file.    Family history  Unable to obtain    Social History     Socioeconomic History   • Marital status: Unknown     Spouse name: Not on file   • Number of children: Not on file   • Years of education: Not on file   • Highest education level: Not on file   Occupational History   • Not on file   Tobacco Use   • Smoking status: Not on file   Substance and Sexual Activity   • Alcohol use: Not on file   • Drug use: Not on file   • Sexual activity: Not on file   Other Topics Concern   • Not on file   Social History Narrative   • Not on file     Social Determinants of Health     Financial Resource Strain:    • Difficulty of Paying Living Expenses:    Food Insecurity:    • Worried About Running Out of Food in the Last Year:    • Ran Out of Food in the Last Year:    Transportation Needs:    • Lack of Transportation (Medical):    • Lack of Transportation (Non-Medical):    Physical Activity:    • Days of Exercise per Week:    • Minutes of Exercise per Session:    Stress:    • Feeling of Stress :    Social Connections:    • Frequency of Communication with Friends and Family:    • Frequency of Social Gatherings with Friends and Family:    • Attends Holiness Services:    • Active Member of Clubs or Organizations:    • Attends Club or Organization Meetings:    • Marital Status:    Intimate Partner Violence:    • Fear of Current or Ex-Partner:    • Emotionally Abused:    • Physically Abused:    • Sexually Abused:        No Known Allergies    Medications:    Current Facility-Administered Medications:   •  albumin human 25% solution 50 g, 50 g, Intravenous, BID, Marilyn Molina M.D.  •  [CANCELED] Special Contact Isolation, , , CONTINUOUS **AND** [COMPLETED] C Diff by PCR rflx Toxin, , , Once **AND** Pharmacy Consult Request, 1 Each, Other, PHARMACY TO DOSE, Marilyn Molina M.D.  •   propofol (DIPRIVAN) injection, 0-60 mcg/kg/min, Intravenous, Continuous, Last Rate: 27.4 mL/hr at 04/28/21 0803, 40 mcg/kg/min at 04/28/21 0803 **AND** Triglycerides Starting now and then Every 3 Days, , , Every 3 Days (0300), Marilyn Molina M.D.  •  dextrose 5% infusion, , Intravenous, Continuous, Marilyn Molina M.D., Last Rate: 75 mL/hr at 04/27/21 1553, New Bag at 04/27/21 1553  •  lidocaine-EPINEPHrine 2%-1:698538 injection 10 mL, 10 mL, Injection, Once, Terrance Pierce Jr., D.O.  •  silver nitrate (SILVER NITRATE APPLICATOR) 75-25 % sticks 1 Applicator, 1 Applicator, Topical, Once, Terrance Pierce Jr., D.O.  •  [COMPLETED] piperacillin-tazobactam (ZOSYN) 4.5 g in  mL IVPB, 4.5 g, Intravenous, Once, Stopped at 04/26/21 0817 **AND** piperacillin-tazobactam (ZOSYN) 4.5 g in  mL IVPB, 4.5 g, Intravenous, Q8HRS, Marilyn Molina M.D., Stopped at 04/28/21 0957  •  OLANZapine (ZYPREXA) tablet 5 mg, 5 mg, Enteral Tube, Q EVENING, Marilyn Molina M.D., 5 mg at 04/27/21 1723  •  insulin regular (HumuLIN R,NovoLIN R) injection, 1-6 Units, Subcutaneous, Q6HRS, Stopped at 04/28/21 0000 **AND** POC blood glucose manual result, , , Q6H **AND** NOTIFY MD and PharmD, , , Once **AND** glucose 4 g chewable tablet 16 g, 16 g, Oral, Q15 MIN PRN **AND** dextrose 50% (D50W) injection 50 mL, 50 mL, Intravenous, Q15 MIN PRN, Marilyn Molina M.D.  •  acetaminophen (Tylenol) tablet 650 mg, 650 mg, Enteral Tube, Q4HRS PRN, Marilyn Molina M.D., 650 mg at 04/28/21 0216  •  midodrine (PROAMATINE) tablet 10 mg, 10 mg, Enteral Tube, TID WITH MEALS, Marilyn Molina M.D., 10 mg at 04/28/21 0732  •  Respiratory Therapy Consult, , Nebulization, Continuous RT, Terrance Pierce Jr., D.O.  •  famotidine (PEPCID) tablet 20 mg, 20 mg, Enteral Tube, Q12HRS, 20 mg at 04/27/21 1723 **OR** famotidine (PEPCID) injection 20 mg, 20 mg, Intravenous, Q12HRS, Terrance Pierce Jr., D.O., 20 mg at 04/28/21 5063  •  MD Alert...ICU Electrolyte  "Replacement per Pharmacy, , Other, PHARMACY TO DOSE, Terrance Pierce Jr., D.O.  •  lidocaine (XYLOCAINE) 1 % injection 1-2 mL, 1-2 mL, Tracheal Tube, Q30 MIN PRN, Terrance Pierce Jr., D.O.  •  fentaNYL (SUBLIMAZE) injection 100 mcg, 100 mcg, Intravenous, Q15 MIN PRN, Stopped at 21 1120 **AND** fentaNYL (SUBLIMAZE) injection 200 mcg, 200 mcg, Intravenous, Q15 MIN PRN, 100 mcg at 21 1800 **AND** fentaNYL (SUBLIMAZE) 50 mcg/mL in 50mL (Continuous Infusion), , Intravenous, Continuous, Last Rate: 6 mL/hr at 21 0625, 300 mcg/hr at 21 0625 **AND** [DISCONTINUED] propofol (DIPRIVAN) injection, 0-80 mcg/kg/min, Intravenous, Continuous **AND** [CANCELED] Triglyceride, , , Every 3 Days (300), Terrance Pierce Jr., D.O.  •  haloperidol lactate (HALDOL) injection 2-5 mg, 2-5 mg, Intravenous, Q4HRS PRN, Eleuterio Treviño M.D., 5 mg at 21  •  potassium bicarbonate (KLYTE) effervescent tablet 50 mEq, 50 mEq, Enteral Tube, DAILY, Miky Gama D.O., 50 mEq at 21 0558  •  albuterol (PROVENTIL) 2.5mg/0.5ml nebulizer solution 2.5 mg, 2.5 mg, Nebulization, Q4H PRN (RT), Evin Hammer M.D., 2.5 mg at 21 0758  •  Pharmacy Consult: Enteral tube insertion - review meds/change route/product selection, 1 Each, Other, PHARMACY TO DOSE, Jeremy M Gonda, M.D.  •  norepinephrine (Levophed) infusion 8 mg/250 mL (premix), 0-30 mcg/min, Intravenous, Continuous, Jeremy M Gonda, M.D., Last Rate: 18.8 mL/hr at 21, 10 mcg/min at 21 0417  •  thiamine (Vitamin B-1) tablet 100 mg, 100 mg, Enteral Tube, DAILY, Radha Beckett M.D., 100 mg at 21 0600  •  folic acid (FOLVITE) tablet 1 mg, 1 mg, Enteral Tube, DAILY, Radha Beckett M.D., 1 mg at 21 0558    Physical Exam:   Vital Signs: BP (!) 99/46   Pulse (!) 107   Temp (!) 38.3 °C (100.9 °F) (Bladder)   Resp (!) 24   Ht 1.778 m (5' 10\")   Wt 115 kg (252 lb 6.8 oz)   SpO2 96%   BMI 36.22 kg/m²   Temp  Av.8 " °C (100 °F)  Min: 35.6 °C (96.1 °F)  Max: 39.8 °C (103.6 °F)  Vital signs reviewed  Constitutional: Patient is intubated and sedated, appears ill  Head: Atraumatic, normocephalic  Eyes: Conjunctival icterus, pupils round and reactive   Mouth/Throat: ET tube in place, continues to ooze blood   Neck: No masses/lymphadenopathy  Cardiovascular: Normal rate, regular rhythm, normal S1S2 and intact distal pulses. No murmur, gallop, or friction rub.  Bilateral pedal edema+  Pulmonary/Chest: Intubated and mechanically ventilated, scattered rales  Abdominal: Distended and firm, not wincing on deep palpation.  BMS in place  Musculoskeletal: No joint tenderness, swelling, erythema, or restriction of motion noted.  Neurological: Unable to assess  Skin: Skin is warm and dry.  Jaundice. No rashes or embolic phenomena noted on exposed skin  Psychiatric: Unable to assess    LABS:  Recent Labs     04/27/21  0545 04/27/21  1800 04/28/21  0223   WBC 19.9* 25.3* 27.8*      Recent Labs     04/27/21  0545 04/27/21  1800 04/28/21  0223   HEMOGLOBIN 8.5* 8.1* 7.9*   HEMATOCRIT 28.4* 27.1* 26.6*   .3* 114.3* 115.7*   MCH 33.6* 34.2* 34.3*   MACROCYTOSIS 2+*  --  2+*   ANISOCYTOSIS 2+*  --  3+*   PLATELETCT 488* 464* 468*       Recent Labs     04/27/21  1800 04/28/21  0035 04/28/21  0455   SODIUM 157* 155* 152*   POTASSIUM 4.0 4.0 4.1   CHLORIDE 119* 116* 115*   CO2 28 28 27   CREATININE 1.55* 1.86* 2.39*        Recent Labs     04/26/21  0258 04/27/21  0545 04/28/21  0035   ALBUMIN 2.7* 2.7* 2.8*        MICRO:  No results found for: BLOODCULTU, BLDCULT, BCHOLD     Latest pertinent labs were reviewed    IMAGING STUDIES:  As above    Hospital Course/Assessment:   Irving Ivory is a 40 y.o. male with a history of morbid obesity, alcoholism, transferred from [facility] where he presented for alcohol abuse, reported alcoholic pancreatitis, subsequently going into alcohol withdrawal syndrome, complicated by worsening AMS, high  CIWA score, requiring intubation for airway protection.  After intubation, patient required norepinephrine infusion, was transferred to [facility] on 4/17.  He was extubated on 4/19.    Patient with ongoing need for pressors, persistent fevers now high-grade, with increasing neutrophilic leukocytosis.  Patient also with ongoing alcoholic hepatitis with T. bili of 14, worsening JEYSON, epistaxis requiring posterior nasal packing. Blood cultures x2 no growth till date, BAL cultures no growth till date, CTA chest with no obvious evidence of infection, C. difficile negative.    Pertinent Diagnoses:  Shock, septic +/- distributive  Neutrophilic leukocytosis  JEYSON, worsening  Alcoholic hepatitis  Epistaxis  Alcohol withdrawal, improved  Morbid obesity  Reported alcoholic pancreatitis    Plan:   -Okay to continue IV Zosyn for now  -Follow pending blood cultures  -Recommend CT abdomen and pelvis to look for an underlying abscess  -Will check HIV, hepatitis panel    Plan was discussed with the primary team, Dr. Molina    ID will follow. Please feel free to call with questions.    Chris Brar M.D.    Please note that this dictation was created using voice recognition software. I have worked with technical experts from [facility] to optimize the interface.  I have made every reasonable attempt to correct obvious errors, but there may be errors of grammar and possibly content that I did not discover before finalizing the note.

## 2021-04-28 NOTE — CARE PLAN
Problem: Safety - Medical Restraint  Goal: Remains free of injury from restraints (Restraint for Interference with Medical Device)  Description: INTERVENTIONS:  1. Determine that other, less restrictive measures have been tried or would not be effective before applying the restraint  2. Evaluate the patient's condition at the time of restraint application  3. Inform patient/family regarding the reason for restraint  4. Q2H: Monitor safety, psychosocial status, comfort, nutrition and hydration  Outcome: PROGRESSING SLOWER THAN EXPECTED

## 2021-04-28 NOTE — PROGRESS NOTES
[facility], due to the patient's continued bleeding, and LAURENT Pierce DO responded to the patient's bedside immediately. Described the patient's current condition including that output was substantial enough to require oral suctioning every 30 minutes. LAURENT Pierce DO placed TXA infused nasal packings bilaterally. Stated that further interventions would most likely require ENT consultation, and this would need to be passed along with communication from nursing.

## 2021-04-28 NOTE — CARE PLAN
Problem: Communication  Goal: The ability to communicate needs accurately and effectively will improve  Outcome: PROGRESSING SLOWER THAN EXPECTED     Problem: Safety  Goal: Will remain free from falls  Outcome: PROGRESSING SLOWER THAN EXPECTED     Problem: Safety  Goal: Will remain free from injury  Outcome: PROGRESSING SLOWER THAN EXPECTED     Problem: Infection  Goal: Will remain free from infection  Outcome: PROGRESSING SLOWER THAN EXPECTED     Problem: Venous Thromboembolism (VTW)/Deep Vein Thrombosis (DVT) Prevention:  Goal: Patient will participate in Venous Thrombosis (VTE)/Deep Vein Thrombosis (DVT)Prevention Measures  Outcome: PROGRESSING SLOWER THAN EXPECTED     Problem: Bowel/Gastric:  Goal: Normal bowel function is maintained or improved  Outcome: PROGRESSING SLOWER THAN EXPECTED

## 2021-04-28 NOTE — PROGRESS NOTES
Epistaxis Procedure    Indication: Bleeding    Procedure: The patient was positioned appropriately and the nares were cleared as well as possible. The bleeding site was the posterior nasal septum as described in earlier flexible fiberoptic scope procedure note.  TXA saturated bilateral posterior nasal packings (Rhino Rocket 7.5 cm) were placed and hemostasis was obtained.    Nasal packings can be removed in the next 24 to 72 hours if bleeding ceases.    The patient tolerated the procedure well.    Complications: None    CPT: Posterior 76546, Bilateral Modifer 50

## 2021-04-28 NOTE — CARE PLAN
Ventilator Daily Summary     Vent Day #3     8.0 ETT @ 26     Ventilator settings changed this shift: Increased ASV to 160%, increased FiO2 to 80%     %  PEEP 8  FiO2 70%     Weaning trials: None     Respiratory Procedures: None     Plan: Continue current ventilator settings and wean mechanical ventilation as tolerated per physician orders.

## 2021-04-28 NOTE — PROGRESS NOTES
We did not and are not going to mobilize this patient at this time. The patient is bleeding profusely and when he is awakened he reaches a RASS +4 while he is trying to manage the copious amount of blood that is pooling is his mouth. It also seems to become less manageable while the patient is awake. This concern also led to the early termination of his SAT.

## 2021-04-28 NOTE — THERAPY
Contact Therapy Note    Patient Name: Irving Ivory  Age:  40 y.o., Sex:  male  Medical Record #: [id number]  Today's Date: 4/28/2021 04/28/21 1600   Treatment Variance   Reason For Missed Therapy Medical - Patient Is Not Medically Stable   Interdisciplinary Plan of Care Collaboration   IDT Collaboration with  Other (See Comments)  (per chart)   Collaboration Comments Patient not medically stable for PT session at this time. Intubated with FIO2 .8, and sedated. Will follow up when appropriate.

## 2021-04-28 NOTE — THERAPY
Occupational Therapy Contact Note:         04/28/21 4194   Interdisciplinary Plan of Care Collaboration   Collaboration Comments Pt not medically stable for mobilization at this time, will hold and round back        Nakia Mcneal OTR/L

## 2021-04-28 NOTE — DISCHARGE PLANNING
Care Transition Team Discharge Planning    Anticipated Discharge Disposition: TBD    Action: Per AM rounds, pt will remain on IV zosyn. Lab blood cultures are pending. Order CT of abdomen/pelvis. Pt on ventilator.    Barriers to Discharge: medical clearance/stability    Plan: Lsw will continue to follow, attend rounds for medical updates, and assist w/ d/c planning.

## 2021-04-28 NOTE — PROCEDURES
Central Line Insertion    Date/Time: 4/28/2021 4:39 PM  Performed by: Rachel Jiang M.D.  Authorized by: Rachel Jiang M.D.     Consent:     Consent obtained:  Verbal and written    Consent given by:  Parent    Risks discussed:  Arterial puncture, incorrect placement, nerve damage, pneumothorax, infection and bleeding    Alternatives discussed:  No treatment, delayed treatment and alternative treatment  Pre-procedure details:     Hand hygiene: Hand hygiene performed prior to insertion      Sterile barrier technique: All elements of maximal sterile technique followed      Skin preparation:  2% chlorhexidine    Skin preparation agent: Skin preparation agent completely dried prior to procedure    Sedation:     Sedation type:  Anxiolysis  Anesthesia:     Anesthesia method:  Local infiltration    Local anesthetic:  Lidocaine 1% w/o epi  Procedure details:     Location:  L internal jugular    Patient position:  Trendelenburg    Procedural supplies:  Triple lumen    Landmarks identified: yes      Ultrasound guidance: yes      Sterile ultrasound techniques: Sterile gel and sterile probe covers were used      Number of attempts:  1    Successful placement: no    Comments:      Obtained pulsatile/ arterial blood return when needle was introduced. No guided wire was placed. Applied pressure for 10 min and monitored for hematoma post procedure.    Dr. Molina was present and supervising this attempt.

## 2021-04-28 NOTE — PROGRESS NOTES
"Critical Care Progress Note    Date of admission  4/17/2021    Chief Complaint  Respiratory failure, encephalopathy    Hospital Course  40-year-old male with BMI 39 transferred from [facility] where he was admitted several days ago prior to admission for pancreatitis and alcohol abuse subsequently going into alcohol withdrawal syndrome.  He required multiple doses of Ativan and had a CIWA score of 25, worsening AMS, intubated and transferred here for further management. Post intubation, pt became hypotensive, started on NE gtt. Lipase 800, CT A/P with no evidence of complications of pancreatitis.     4/17 intubated at OSH  4/18 s/p bronched due to increased secretions.   4/19 extubated    4/26: low dose pressors, hemoptysis. Reintubated at night for failure to clear thick bloody secretions and hypoxia.   4/27: bronch no source of bleed in airways, VD2 8/50%, spiking fevers, switch precedex to propofol, C. Diff negative.       Interval Problem Update  Reviewed last 24 hour events:  Neuro: SAT \"was coughing blood\" and moving but not following  HR: sinus tach  SBP: levo up to 15 now  Tmax: febilre  GI: BMS ~200 overnight, TF at goal  I/O: dark urine, 1L yesterday  Lines: R IJ TLC  Mobility: contraindicated, active bleed  Resp: VD3, 24/420/8/100   Vte: contraindicated d/t epistaxis  PPI/H2:pepcid  Antibx: zosyn, all cultures negative, ID consult    Ongoing epistaxis despite packing  Add albumin for HRS  Called ENT about epistaxis-Dr. Gonzalez will come see patient  Worsening shock, renal failure, fevers concerning for undiscovered source of infection  ID consult  CT abdomen/pelvis after ENT   Replace central line  Cont zosyn  Consider empiric anti-fungal  I called the nephrology office on-call and requested a consult twice, and did not receive a call back ever    Review of Systems  Review of Systems   Unable to perform ROS: Intubated        Vital Signs for last 24 hours   Temp:  [37.4 °C (99.3 °F)-38.5 °C " (101.3 °F)] 38.3 °C (100.9 °F)  Pulse:  [] 107  Resp:  [16-36] 24  BP: ()/(38-93) 99/46  SpO2:  [91 %-100 %] 96 %    Hemodynamic parameters for last 24 hours       Respiratory Information for the last 24 hours  Vent Mode: APVCMV  Rate (breaths/min): 24  Vt Target (mL): 420  PEEP/CPAP: 8  P Support: 5  MAP: 14  Length of Weaning Trial (Hours): 1 hr  Control VTE (exp VT): 422    Physical Exam   Physical Exam  Constitutional:       Appearance: He is ill-appearing.      Interventions: He is sedated and intubated.   HENT:      Nose:      Comments: Cor track     Mouth/Throat:      Mouth: Mucous membranes are dry.      Comments: Ongoing blood being suctioned from oropharynx  Eyes:      General: Scleral icterus present.      Pupils: Pupils are equal, round, and reactive to light.   Cardiovascular:      Rate and Rhythm: Regular rhythm. Tachycardia present.      Pulses: Normal pulses.   Pulmonary:      Effort: No respiratory distress. He is intubated.      Breath sounds: Rhonchi present.   Abdominal:      General: Bowel sounds are normal. There is distension.      Tenderness: There is no abdominal tenderness.   Musculoskeletal:      Right lower leg: Edema present.      Left lower leg: Edema present.   Skin:     Coloration: Skin is jaundiced.   Neurological:      Comments: Following commands even on max precedex and fentanyl   Psychiatric:      Comments: Unable to assess         Medications  Current Facility-Administered Medications   Medication Dose Route Frequency Provider Last Rate Last Admin   • albumin human 25% solution 50 g  50 g Intravenous BID Marilyn Molina M.D. 150 mL/hr at 04/28/21 1003 50 g at 04/28/21 1003   • Pharmacy Consult Request  1 Each Other PHARMACY TO DOSE Marilyn Molina M.D.       • propofol (DIPRIVAN) injection  0-60 mcg/kg/min Intravenous Continuous Marilyn Molina M.D. 27.4 mL/hr at 04/28/21 0803 40 mcg/kg/min at 04/28/21 0803   • dextrose 5% infusion   Intravenous Continuous Marilyn MONTOYA  JADEN Molina 75 mL/hr at 04/27/21 1553 New Bag at 04/27/21 1553   • lidocaine-EPINEPHrine 2%-1:640232 injection 10 mL  10 mL Injection Once SARIAH Basurto Jr..NEVIN.       • silver nitrate (SILVER NITRATE APPLICATOR) 75-25 % sticks 1 Applicator  1 Applicator Topical Once Terrance Pierce Jr., D.O.       • piperacillin-tazobactam (ZOSYN) 4.5 g in  mL IVPB  4.5 g Intravenous Q8HRS Marilyn Molina M.D.   Stopped at 04/28/21 0957   • OLANZapine (ZYPREXA) tablet 5 mg  5 mg Enteral Tube Q EVENING Marilyn Molina M.D.   5 mg at 04/27/21 1723   • insulin regular (HumuLIN R,NovoLIN R) injection  1-6 Units Subcutaneous Q6HRS Marilyn Molina M.D.   Stopped at 04/28/21 0000    And   • glucose 4 g chewable tablet 16 g  16 g Oral Q15 MIN PRN Marilyn Molina M.D.        And   • dextrose 50% (D50W) injection 50 mL  50 mL Intravenous Q15 MIN PRN Marilyn Molina M.D.       • acetaminophen (Tylenol) tablet 650 mg  650 mg Enteral Tube Q4HRS PRN Marilyn Molina M.D.   650 mg at 04/28/21 0216   • midodrine (PROAMATINE) tablet 10 mg  10 mg Enteral Tube TID WITH MEALS Marilyn Molina M.D.   10 mg at 04/28/21 0954   • Respiratory Therapy Consult   Nebulization Continuous RT SARIAH Basurto Jr..O.       • famotidine (PEPCID) tablet 20 mg  20 mg Enteral Tube Q12HRS SARIAH Basurto Jr..O.   20 mg at 04/27/21 1723    Or   • famotidine (PEPCID) injection 20 mg  20 mg Intravenous Q12HRS Terrance Pierce Jr. D.O.   20 mg at 04/28/21 0557   • MD Alert...ICU Electrolyte Replacement per Pharmacy   Other PHARMACY TO DOSE SARIAH Basurto Jr..O.       • lidocaine (XYLOCAINE) 1 % injection 1-2 mL  1-2 mL Tracheal Tube Q30 MIN PRN SARIAH Basurto Jr..O.       • fentaNYL (SUBLIMAZE) injection 100 mcg  100 mcg Intravenous Q15 MIN PRN SARIAH Basurto Jr..O.   Stopped at 04/27/21 1120    And   • fentaNYL (SUBLIMAZE) injection 200 mcg  200 mcg Intravenous Q15 MIN PRN SARIAH Basurto Jr..O.   100 mcg at 04/27/21 1800    And   • fentaNYL  (SUBLIMAZE) 50 mcg/mL in 50mL (Continuous Infusion)   Intravenous Continuous CIERA Basurto Jr.OIsiah 6 mL/hr at 04/28/21 0625 300 mcg/hr at 04/28/21 0625   • haloperidol lactate (HALDOL) injection 2-5 mg  2-5 mg Intravenous Q4HRS PRN Eleuterio Treviño M.D.   5 mg at 04/25/21 2004   • potassium bicarbonate (KLYTE) effervescent tablet 50 mEq  50 mEq Enteral Tube DAILY Miky Gama D.O.   50 mEq at 04/28/21 0558   • albuterol (PROVENTIL) 2.5mg/0.5ml nebulizer solution 2.5 mg  2.5 mg Nebulization Q4H PRN (RT) Evin Hammer M.D.   2.5 mg at 04/20/21 0758   • Pharmacy Consult: Enteral tube insertion - review meds/change route/product selection  1 Each Other PHARMACY TO DOSE Jeremy M Gonda, M.D.       • norepinephrine (Levophed) infusion 8 mg/250 mL (premix)  0-30 mcg/min Intravenous Continuous Jeremy M Gonda, M.D. 18.8 mL/hr at 04/28/21 0417 10 mcg/min at 04/28/21 0417   • thiamine (Vitamin B-1) tablet 100 mg  100 mg Enteral Tube DAILY Radha Beckett M.D.   100 mg at 04/28/21 0600   • folic acid (FOLVITE) tablet 1 mg  1 mg Enteral Tube DAILY Radha Beckett M.D.   1 mg at 04/28/21 0558       Fluids    Intake/Output Summary (Last 24 hours) at 4/28/2021 1028  Last data filed at 4/28/2021 0800  Gross per 24 hour   Intake 5743.37 ml   Output 990 ml   Net 4753.37 ml       Laboratory  Recent Labs     04/26/21  2113 04/27/21  0448 04/28/21  0338   ISTATAPH 7.455 7.508* 7.333*   ISTATAPCO2 43.9* 39.0* 54.3*   ISTATAPO2 191* 111* 101*   ISTATATCO2 32 32 30   FWAXMAT4XRN 100* 99 97   ISTATARTHCO3 30.8* 31.0* 28.8*   ISTATARTBE 6* 7* 3   ISTATTEMP 99.2 F 102.0 F 38.3 C   ISTATFIO2 100 60 100   ISTATSPEC Arterial Arterial Arterial   ISTATAPHTC 7.450 7.479 7.315*   SHRYTSLV1JA 193* 124* 110*         Recent Labs     04/26/21  1850 04/26/21  1850 04/27/21  0545 04/27/21  1225 04/27/21  1800 04/28/21  0035 04/28/21  0455   SODIUM 158*   < > 151*   < > 157* 155* 152*   POTASSIUM 3.6   < > 4.1   < > 4.0 4.0 4.1    CHLORIDE 119*   < > 115*   < > 119* 116* 115*   CO2 31   < > 28   < > 28 28 27   BUN 31*   < > 46*   < > 44* 44* 55*   CREATININE 0.89   < > 1.32   < > 1.55* 1.86* 2.39*   MAGNESIUM 2.9*  --  2.9*  --   --   --   --    PHOSPHORUS 2.3*  --  1.8*  --   --   --   --    CALCIUM 8.1*   < > 7.9*   < > 7.7* 7.6* 7.5*    < > = values in this interval not displayed.     Recent Labs     04/26/21 0258 04/26/21  0736 04/26/21  1850 04/27/21  0545 04/27/21  1225 04/27/21 1800 04/28/21 0035 04/28/21 0455   ALTSGPT 73*  --   --  70*  --   --  65*  --    ASTSGOT 184*  --   --  176*  --   --  142*  --    ALKPHOSPHAT 123*  --   --  106*  --   --  90  --    TBILIRUBIN 14.0*  --   --  14.0*  --   --  14.2*  --    PREALBUMIN  --  15.1*  --   --   --   --   --   --    GLUCOSE 240*  --    < > 170*   < > 168* 154* 176*    < > = values in this interval not displayed.     Recent Labs     04/26/21 0258 04/26/21 0258 04/27/21  0545 04/27/21 1800 04/28/21 0035 04/28/21  0223   WBC 18.9*   < > 19.9* 25.3*  --  27.8*   NEUTSPOLYS 84.90*  --  75.60*  --   --  60.80   LYMPHOCYTES 9.00*  --  15.60*  --   --  12.20*   MONOCYTES 3.90  --  5.70  --   --  3.50   EOSINOPHILS 0.10  --  0.30  --   --  0.90   BASOPHILS 0.30  --  0.40  --   --  0.90   ASTSGOT 184*  --  176*  --  142*  --    ALTSGPT 73*  --  70*  --  65*  --    ALKPHOSPHAT 123*  --  106*  --  90  --    TBILIRUBIN 14.0*  --  14.0*  --  14.2*  --     < > = values in this interval not displayed.     Recent Labs     04/26/21  0258 04/26/21  0840 04/27/21  0545 04/27/21  1800 04/28/21  0223   RBC   < >  --  2.53* 2.37* 2.30*   HEMOGLOBIN   < >  --  8.5* 8.1* 7.9*   HEMATOCRIT   < >  --  28.4* 27.1* 26.6*   PLATELETCT   < >  --  488* 464* 468*   PROTHROMBTM  --  23.3* 23.3*  --   --    INR  --  2.00* 2.00*  --   --     < > = values in this interval not displayed.       Imaging  X-Ray:  I have personally reviewed the images and compared with prior images.    Assessment/Plan  * Septic shock  (HCC)- (present on admission)  Assessment & Plan  Unclear source  Ongoing fevers with rising WBC  Rising pressor requirement is concerning for       Continue zosyn  Unclear source.  Blood, urine, sputum, C. Diff negative. No wounds  Change central line today  Image abdomen  Continue zosyn  ID consult-    Hemoptysis  Assessment & Plan  Ongoing thick bloody secretions  Pt was intubated for failure to manage these secretions  D/c'ed lovenox 4/27  Source is epistaxis, see Dr. Pierce notes from last night  ENT consulted-Dr. Gonzalez to see patient    Acute encephalopathy  Assessment & Plan  Was initially d/t EtOH withdrawal  Now likely metabolic d/t liver disease, toxic d/t benzos and steroids and ICU delirium  Hold lactulose for diarrhea and hypernatremia  olanzapine for agitated delirium  Sedation with propofol/fent  Delirium precautions    Acute respiratory failure with hypoxia (HCC)- (present on admission)  Assessment & Plan  Intubated at outside hospital for aspiration and hypoxia  4/18 s/p bronch due to increased secretions.   4/20 Extubated   reintubated 4/26 for thick, bloody secretions  Hypoxia likely d/t aspiration pneumonitis vs PNA    Lung protective ventilation  All appropriate vent bundles  Will need decreased secretions to be able to extubate again  Worsening oxygenation today, probably due to aspirated blood from epistaxis-->control of epistaxis as below    Alcoholic hepatitis- (present on admission)  Assessment & Plan  D/c prednisolone as the data supporting use in acute alcoholic hepatitis is not robust, and I feel the risks of steroids (delirium, weakness, infections) outweigh the benefits at this time      Hypernatremia- (present on admission)  Assessment & Plan  Increase free water flushes further  Add D5  Trend Q6      Aspiration pneumonitis (HCC)  Assessment & Plan  Completed 5d augmentin      Pancreatitis- (present on admission)  Assessment & Plan  Questionable ?lipase at the outside hospital was  800, however CT abdomen showed no signs of acute pancreatitis   Possibly triggered by alcohol   Repeat lipase here was 412  Monitor    Abdominal distension  Assessment & Plan  No ascites on POCUS    Alcoholic cirrhosis (HCC)  Assessment & Plan  MELD score is 29, indicating 19.6% 3 month mortality  Not a current transplant candidate due to active EtOH abuse    Coagulopathy (HCC)  Assessment & Plan  Due to liver disease  Replete vitamin K    Thrombocytosis (HCC)  Assessment & Plan  Possibly brewing infection  Trend    Anemia  Assessment & Plan  Macrocytic, probably d/t liver disease and EtOH    Hypophonia with hoarseness  Assessment & Plan  Re-evaluate once extubated    Goals of care, counseling/discussion  Assessment & Plan  4/19 discussed with Bryon, brother and sister in law on the phone and updated pt's critical condition. Mother is coming tonight. All questions were answered  4/20 mother was at bedside and I updated her on patient's condition. All questions were answered  4/22 mother was at bedside and I updated her on patient's condition. All questions were answered  4/23 had a long discussion with brother and mother to update about patient's critical condition.  4/28 discussed with mom again about how much worse he is getting.  Recommended DNR as I do not think CPR would be beneficial in this situation (patient is not a transplant candidate, and this is all a result of his liver disease and alcohol). Mom is in agreement but wants to discuss with patient's brothers    Hypophosphatemia- (present on admission)  Assessment & Plan  Severe  Continue to agressively replete    Alcohol withdrawal (HCC)- (present on admission)  Assessment & Plan  Out of withdrawal window  Cont vitamins    Alcohol abuse- (present on admission)  Assessment & Plan  Since the age of 16 years as per the documentation  Pt is actively drinking. 12packs of beer daily and a pint of hard liquor daily.    Last drink was before admission   Needs  alcohol cessation counseling and resources on discharge       VTE:  Contraindicated  Ulcer: H2 Antagonist  Lines: Central Line  Ongoing indication addressed and Sotomayor Catheter  Ongoing indication addressed    I have performed a physical exam and reviewed and updated ROS and Plan today (4/28/2021). In review of yesterday's note (4/27/2021), there are no changes except as documented above.     Discussed patient condition and risk of morbidity and/or mortality with RN, RT, Pharmacy, Charge nurse / hot rounds and Patient     The patient remains critically ill on ventilator.  Critical care time = 80 minutes in directly providing and coordinating critical care and extensive data review.  No time overlap and excludes procedures.

## 2021-04-28 NOTE — PROGRESS NOTES
Pulmonary/Critical Care Medicine   Progress Note    Date of service: 4/28/2021  Time: 1:38 AM    Called back to bedside for continuous bleeding.  Core track has been removed.  TXA saturated posterior nasal packings were placed into the nares and inflated to provide adequate compression to the posterior septum.  We will continue to monitor for additional bleeding, otolaryngology would need to be consulted if bleeding does return.    CIERA Basurto Jr.O.  [facility] Critical Care

## 2021-04-29 PROBLEM — N17.9 ACUTE RENAL FAILURE (ARF) (HCC): Status: ACTIVE | Noted: 2021-01-01

## 2021-04-29 PROBLEM — R04.0 EPISTAXIS: Status: ACTIVE | Noted: 2021-01-01

## 2021-04-29 NOTE — ASSESSMENT & PLAN NOTE
Bleeding finally resolved after ENT intervention and 4U FFP  Hgb q12  Bleeding again, TEG guided therapy

## 2021-04-29 NOTE — OP REPORT
DATE OF SERVICE:  04/28/2021     SURGEON:  Scottie Gonzalez MD     ASSISTANT:  None.     ANESTHESIA:  Topical using 4% cocaine solution.     PREOPERATIVE DIAGNOSIS:  Persistent recalcitrant epistaxis, coagulopathy.     POSTOPERATIVE DIAGNOSIS:  Persistent recalcitrant epistaxis, coagulopathy.     NAME OF THE PROCEDURE:  Control of epistaxis with posterior nasal packing.     INDICATIONS:  The patient is a 40-year-old man with alcohol withdrawal   syndrome, cirrhosis, and anemia.  He developed nosebleed in association with   feeding tube placement transnasally.  The tube was subsequently placed orally.    Anterior nasal packs with Rhino Rockets bilaterally replaced with persistent   oozing and bleeding noted both per _____ as well as through the nose.     DESCRIPTION OF PROCEDURE:  The patient is currently intubated and sedated.    The Rhino Rockets were removed from the nasal cavity on both sides.  Abundant   blood clot present in the nasal cavity.  This was aspirated as well as removed   with forceps to clear the area.  The patient is noted to have a septal spur   and deviation to the left side.  There appears to be a small tear or rent in   the mucosa along the anterior septum bilaterally, left greater than right.    Active bleeding from the area is not readily apparent following initial   clearance of the clot.  Clot is present in the nasopharynx and I am unable to   remove transnasally.  Multiple segments of large blood clots were then cleared   from the mouth and pharynx with forceps removal as well as aspiration.  This   is limited by the patient's resistance to opening of the jaw to allow for   access to the pharynx.  Once as much clot as carefully removed from the   pharynx was accomplished, attention was redirected to the nose.  Approximately   4 mL of 4% cocaine solution applied topically in cottonoid pledgets on both   sides of the nose.  After allowing for vasoconstriction and anesthesia, the   cottonoid  pledgets were removed.  The nose was inspected.  Diffuse oozing was   noted from the anterior septum bilaterally.  There was also some bleeding   present along the middle turbinate and inferior turbinate areas posteriorly on   the right side.     It is elected to place a posterior nasal pack with a 10 cm Batista Merocel pack   coated with antibiotic ointment.  Once this was placed, a 10 cm Batista Merocel   posterior nasal pack is divided in half and the anterior portion of the left   nasal cavity is packed with the Merocel sponge coated with antibiotic   ointment.  Both packs were infiltrated with saline.  Following placement of   posterior nasal pack, significant bleeding is not noted in the posterior   pharynx at this time although there appears to be small amount of residual   clot in the retrolingual portion of the pharynx, which I am unable to clear   adequately.     The patient should be on antibiotic coverage with the nasal packs in place for   sinusitis with mucopurulent discharges noted following clearance of the blood   clot from the nasal cavity.  The nasal pack should remain in place for   approximately four to five days.  The coagulopathy hopefully will be   corrected.  If significant bleeding ensues with the nasal packs in place, then   treatment with cryoprecipitate would be appropriate to correct the   coagulopathy.        ______________________________  MD CAS ALCARAZ/DALTON/MAIK    DD:  04/28/2021 18:18  DT:  04/28/2021 19:33    Job#:  [id number]

## 2021-04-29 NOTE — PROCEDURES
Central Line Insertion    Date/Time: 4/28/2021 6:39 PM  Performed by: Marilyn Molina M.D.  Authorized by: Marilyn Molina M.D.     Consent:     Consent obtained:  Written    Consent given by:  Parent    Risks discussed:  Arterial puncture, incorrect placement, bleeding, infection and pneumothorax  Pre-procedure details:     Hand hygiene: Hand hygiene performed prior to insertion      Sterile barrier technique: All elements of maximal sterile technique followed      Skin preparation:  ChloraPrep  Sedation:     Sedation type: already sedated for vent.  Anesthesia:     Anesthesia method:  Local infiltration    Local anesthetic:  Lidocaine 1% w/o epi  Procedure details:     Location:  L femoral    Patient position:  Flat    Procedural supplies:  Triple lumen    Catheter size:  7 Fr    Landmarks identified: yes      Ultrasound guidance: yes      Sterile ultrasound techniques: Sterile gel and sterile probe covers were used      Number of attempts:  1    Successful placement: yes    Post-procedure details:     Post-procedure:  Dressing applied and line sutured    Assessment:  Blood return through all ports and free fluid flow    Patient tolerance of procedure:  Tolerated well, no immediate complications  Comments:      Verified placement by visualization of bubbles in IVC and RA

## 2021-04-29 NOTE — ASSESSMENT & PLAN NOTE
Family decided against HD given his very poor prognosis even with HD  Cont q12 BMP for prognostic value for family

## 2021-04-29 NOTE — PROGRESS NOTES
Dr. Molina notified of downtrending Na levels. Plan to dc D5 IVF and decrease water flushes to q4 hours. wctm

## 2021-04-29 NOTE — THERAPY
Missed Therapy     Patient Name: Irving Ivory  Age:  40 y.o., Sex:  male  Medical Record #: [id number]  Today's Date: 4/29/2021    Discussed missed therapy with RN    Pt now intubated/sedated; RN recommended hold. Will hold OT and continue to follow until appropriate/able to continue OT services.

## 2021-04-29 NOTE — FLOWSHEET NOTE
Ventilator Daily Summary    Vent Day # 4  8 @ 26    APVCMV: 30/420/+14/100%    Plan: Continue current ventilator settings and wean mechanical ventilation as tolerated per physician orders.

## 2021-04-29 NOTE — PROGRESS NOTES
Notified by RN that patient continues to have anemia despite blood transfusion earlier tonight.  His hemoglobin did not respond to the 2 unit packed red cells he received and remained 6.7.  I reviewed his Alexsander/platelet mapping that was performed earlier show any prolonged R time.  I ordered 2 additional units of packed red cells as well as 2 units of FFP to be given stat.  Patient is requiring increasing vasopressors including max dose norepinephrine as well as vasopressin throughout the night.  He remains febrile with an increase in leukocytosis over the last several days.  I ordered a repeat procalcitonin as well as blood cultures and will start the patient on Zyvox for possible pneumonia.  I am awaiting radiology interpretation of his CT abdomen/pelvis but on my review, I do not see any large fluid collection in the abdomen nor retroperitoneal space to explain his blood loss.  It is most likely still secondary to epistaxis despite ENTs attempts earlier today at packing and cautery.  I will continue to trend H/H and monitor for possible intraluminal bleeding.  He has a rectal tube in place and his core track was placed to suction which showed no obvious bleeding.  I also increased his ventilator support given enlarged A-a gradient.  His kidney function is worsening as well with developing oliguria.  Unfortunately despite his young age, his prognosis is worsening.

## 2021-04-29 NOTE — CARE PLAN
Problem: Communication  Goal: The ability to communicate needs accurately and effectively will improve  Outcome: PROGRESSING AS EXPECTED     Problem: Safety  Goal: Will remain free from injury  Outcome: PROGRESSING AS EXPECTED  Goal: Will remain free from falls  Outcome: PROGRESSING AS EXPECTED     Problem: Venous Thromboembolism (VTW)/Deep Vein Thrombosis (DVT) Prevention:  Goal: Patient will participate in Venous Thrombosis (VTE)/Deep Vein Thrombosis (DVT)Prevention Measures  Outcome: PROGRESSING AS EXPECTED     Problem: Bowel/Gastric:  Goal: Normal bowel function is maintained or improved  Outcome: PROGRESSING AS EXPECTED  Goal: Will not experience complications related to bowel motility  Outcome: PROGRESSING AS EXPECTED     Problem: Knowledge Deficit  Goal: Knowledge of disease process/condition, treatment plan, diagnostic tests, and medications will improve  Outcome: PROGRESSING AS EXPECTED  Goal: Knowledge of the prescribed therapeutic regimen will improve  Outcome: PROGRESSING AS EXPECTED     Problem: Discharge Barriers/Planning  Goal: Patient's continuum of care needs will be met  Outcome: PROGRESSING AS EXPECTED     Problem: Fluid Volume:  Goal: Will maintain balanced intake and output  Outcome: PROGRESSING AS EXPECTED     Problem: Skin Integrity  Goal: Risk for impaired skin integrity will decrease  Outcome: PROGRESSING AS EXPECTED     Problem: Safety - Medical Restraint  Goal: Remains free of injury from restraints (Restraint for Interference with Medical Device)  Description: INTERVENTIONS:  1. Determine that other, less restrictive measures have been tried or would not be effective before applying the restraint  2. Evaluate the patient's condition at the time of restraint application  3. Inform patient/family regarding the reason for restraint  4. Q2H: Monitor safety, psychosocial status, comfort, nutrition and hydration  Outcome: PROGRESSING AS EXPECTED  Goal: Free from restraint(s) (Restraint for  Interference with Medical Device)  Description: INTERVENTIONS:  1. ONCE/SHIFT or MINIMUM Q12H: Assess and document the continuing need for restraints  2. Q24H: Continued use of restraint requires LIP to perform face to face examination and written order  3. Identify and implement measures to help patient regain control  Outcome: PROGRESSING AS EXPECTED     Problem: Pain Management  Goal: Pain level will decrease to patient's comfort goal  Outcome: PROGRESSING AS EXPECTED     Problem: Psychosocial Needs:  Goal: Level of anxiety will decrease  Outcome: PROGRESSING AS EXPECTED

## 2021-04-29 NOTE — CONSULTS
DATE OF SERVICE:  04/28/2021     OTOLARYNGOLOGY CONSULTATION     REQUESTING PHYSICIAN:  Marilyn Molina MD.     REASON FOR CONSULTATION:  Epistaxis.     HISTORY OF PRESENT ILLNESS:  The patient is a 40-year-old male who was   transferred here from [facility] with alcohol withdrawal and possible   pancreatitis.  During transport, he apparently aspirated and was intubated en   route.  He has been intubated since that time.  He has developed DTs with   alcohol withdrawal and is currently sedated with endotracheal tube in place.    With placement of a Cortrak feeding tube, bleeding ensued from the nose.  The   patient subsequently had anterior packing with bilateral Rhino Rockets without   control of bleeding.  Continued oozing is noted and I was asked to see the   patient for further treatment.  He does have elevated INR to 2.0.  He has not   responded to vitamin K.  Platelet count is 400,000 and platelet function   appeared to be relatively normal.  Hemoglobin and hematocrit on 04/17 was 8.2   and 24.4.  Subsequently, hemoglobin and hematocrit was noted to increase to   9.2/29.9 approximately three days ago, but has dropped to 7.9/26.6.     PAST MEDICAL HISTORY:  The patient's past medical history is obtained from the   review of the chart as the patient is intubated and no historian is   available.  Medical problems during admission reported to be acute   pancreatitis, respiratory failure, metabolic encephalopathy, anemia,   hypervolemic hyponatremia, and alcohol abuse complicated by withdrawal   syndrome and delirium.  Past medical history is otherwise not obtainable.     SURGICAL HISTORY:  Not obtainable.     FAMILY HISTORY:  Not obtainable.     ALLERGIES:  No allergies are listed in the chart.     PHYSICAL EXAMINATION:  GENERAL:  The patient to be currently orally intubated.  He is obese.  HEENT:  There is an oral feeding tube present as well.  His bilateral Rhino   Rockets nasal packs present.  He does  appear to be mildly jaundiced. There is   a blood clot present in the pharynx as well as along the nares bilaterally.  NEUROLOGIC:  He is currently sedated and nonresponsive.     IMPRESSION:  Persistent epistaxis despite previous packing, coagulopathy with   elevated INR.     PLAN:  For control of epistaxis with possible posterior nasal packing.    Antibiotic coverage for sinusitis as appropriate as well.        ______________________________  MD CAS ALCARAZ/DALTON/MAIK    DD:  04/28/2021 18:13  DT:  04/28/2021 19:49    Job#:  [id number]

## 2021-04-29 NOTE — PROGRESS NOTES
Infectious Disease Progress Note    Author: Chris Brar M.D. Date & Time of service: 2021  9:52 AM    Chief Complaint:  Follow-up for septic shock    Interval History:   T-max 101.3 last night, hemoglobin continues to trend down, upper airway bleeding continues, white count up to 29, increasing pressor requirements, worsening renal function, T bili up to 14.4.  Overnight, patient received more FFP's and more blood transfusions and Zyvox was also added.    Labs Reviewed and Medications Reviewed.    Review of Systems:  Review of Systems   Unable to perform ROS: Intubated       Hemodynamics:  Temp (24hrs), Av.8 °C (100.1 °F), Min:37.3 °C (99.1 °F), Max:38.5 °C (101.3 °F)  Temperature: 37.3 °C (99.1 °F), Monitored Temp: 38.5 °C (101.3 °F)  Pulse  Av.7  Min: 57  Max: 125   Blood Pressure: 107/55      Physical Exam:  Physical Exam  Vitals and nursing note reviewed.   Constitutional:       Appearance: He is ill-appearing.      Comments: Intubated, sedated, reported following simple commands   HENT:      Mouth/Throat:      Comments: ET tube in place, per report continues to ooze blood around the tube requiring suction, no external ongoing bleeding noted at time of exam   Eyes:      General: Scleral icterus present.   Cardiovascular:      Rate and Rhythm: Tachycardia present.      Heart sounds: No murmur.   Pulmonary:      Effort: Pulmonary effort is normal.      Breath sounds: Rales present. No wheezing.   Abdominal:      General: There is distension.      Comments: Firm, no wincing on deep palpation   Musculoskeletal:         General: No swelling or tenderness.   Skin:     Coloration: Skin is jaundiced.      Findings: No erythema or rash.   Neurological:      Comments: Intubated and sedated   Psychiatric:      Comments: Unable to assess         Meds:    Current Facility-Administered Medications:   •  linezolid (ZYVOX) IV  •  vasopressin (PITRESSIN) infusion  •  EPINEPHrine (Adrenalin) infusion  •   EPINEPHrine  •  atropine  •  [START ON 4/30/2021] famotidine **OR** [START ON 4/30/2021] famotidine  •  propofol **AND** Triglycerides Starting now and then Every 3 Days  •  [COMPLETED] piperacillin-tazobactam **AND** piperacillin-tazobactam  •  OLANZapine  •  insulin regular **AND** POC blood glucose manual result **AND** NOTIFY MD and PharmD **AND** [DISCONTINUED] glucose **AND** dextrose 50%  •  acetaminophen  •  Respiratory Therapy Consult  •  MD Alert...Adult ICU Electrolyte Replacement per Pharmacy  •  lidocaine  •  fentaNYL **AND** fentaNYL **AND** fentaNYL **AND** [DISCONTINUED] propofol **AND** [CANCELED] Triglyceride  •  haloperidol lactate  •  potassium bicarbonate  •  albuterol  •  Pharmacy  •  norepinephrine (Levophed) infusion  •  thiamine  •  folic acid    Labs:  Recent Labs     04/27/21  0545 04/27/21  1800 04/28/21  0223 04/28/21  0223 04/28/21  1835 04/29/21  0048 04/29/21  0848   WBC 19.9*   < > 27.8*   < > 29.5* 28.7* 29.0*   RBC 2.53*   < > 2.30*   < > 2.00* 2.01* 2.81*   HEMOGLOBIN 8.5*   < > 7.9*   < > 6.7* 6.7* 9.0*   HEMATOCRIT 28.4*   < > 26.6*   < > 23.4* 22.6* 29.4*   .3*   < > 115.7*   < > 117.0* 112.4* 104.6*   MCH 33.6*   < > 34.3*   < > 33.5* 33.3* 32.0   RDW 89.2*   < > 94.1*   < > 91.3* 88.3* 82.3*   PLATELETCT 488*   < > 468*   < > 398 347 320   MPV 11.2   < > 11.5   < > 12.0 12.2 11.9   NEUTSPOLYS 75.60*  --  60.80  --   --  72.30*  --    LYMPHOCYTES 15.60*  --  12.20*  --   --  9.30*  --    MONOCYTES 5.70  --  3.50  --   --  4.20  --    EOSINOPHILS 0.30  --  0.90  --   --  0.80  --    BASOPHILS 0.40  --  0.90  --   --  0.00  --    RBCMORPHOLO Present  --  Present  --   --  Present  --     < > = values in this interval not displayed.     Recent Labs     04/29/21  0048 04/29/21  0558 04/29/21  0848   SODIUM 144 140 143   POTASSIUM 5.3 5.4 4.8   CHLORIDE 104 103 106   CO2 22 21 19*   GLUCOSE 203* 203* 175*   BUN 66* 67* 65*     Recent Labs     04/27/21  0545 04/27/21  1225  04/28/21  0035 04/28/21  0455 04/29/21  0048 04/29/21  0558 04/29/21  0848   ALBUMIN 2.7*  --  2.8*  --  3.1*  --   --    TBILIRUBIN 14.0*  --  14.2*  --  14.4*  --   --    ALKPHOSPHAT 106*  --  90  --  72  --   --    TOTPROTEIN 5.6*  --  5.6*  --  5.6*  --   --    ALTSGPT 70*  --  65*  --  44  --   --    ASTSGOT 176*  --  142*  --  102*  --   --    CREATININE 1.32   < > 1.86*   < > 3.78* 3.79* 4.39*    < > = values in this interval not displayed.       Imaging:  CT-ABDOMEN-PELVIS W/O    Result Date: 4/29/2021 4/28/2021 10:47 PM HISTORY/REASON FOR EXAM: source of undifferentiated sepsis. Renal failure so can't do IV contrast TECHNIQUE/EXAM DESCRIPTION:  CT abdomen and pelvis without IV contrast. Sequential axial CT images were obtained from lung bases to the proximal femurs without contrast. Low dose optimization technique was utilized for this CT exam including automated exposure control and adjustment of the mA and/or kV according to patient size. COMPARISON: None CT ABDOMEN FINDINGS: Non contrast technique limits evaluation of the solid abdominal organs. Bilateral lung base consolidations with air bronchograms are seen. The liver is normal in contour. Hepatomegaly is observed. No intrahepatic biliary ductal dilatation is noted. The gallbladder appears within normal limits. The spleen is unremarkable. The pancreas is grossly normal. Bilateral adrenal glands appear within normal limits. The kidneys are unremarkable.  The ureters are normal in caliber along their visualized course. Scattered colonic diverticula are seen. The appendix is incompletely visualized, the visualized portions of the appendix appear grossly unremarkable. The small bowel is unremarkable. The bony structures are age appropriate. Scattered abdominal ascites is seen. CT PELVIS FINDINGS: Sotomayor catheter is seen within the bladder, otherwise the bladder is within normal limits. Fat-containing bilateral inguinal hernias are seen.     1.   Bilateral lung base consolidations with air bronchograms, could represent atelectasis or infiltrate. 2.  Scattered abdominal ascites 3.  Fat-containing bilateral inguinal hernias 4.  Diverticulosis 5.  Hepatomegaly    DX-CHEST-LIMITED (1 VIEW)    Result Date: 4/23/2021 4/23/2021 9:15 AM HISTORY/REASON FOR EXAM: Fever and hypotension. TECHNIQUE/EXAM DESCRIPTION AND NUMBER OF VIEWS: Single AP view of the chest. COMPARISON: 4/20/2021 FINDINGS: There are multiple supportive devices again seen, stable. There is bilateral linear atelectasis. The heart is enlarged. There is no pleural effusion. There is elevation the right hemidiaphragm.     1.  Stable cardiomegaly. 2.  Enhancing patchy bilateral atelectasis, improved.    DX-CHEST-PORTABLE (1 VIEW)    Result Date: 4/28/2021 4/28/2021 12:20 PM HISTORY/REASON FOR EXAM:  Shortness of Breath. TECHNIQUE/EXAM DESCRIPTION AND NUMBER OF VIEWS: Single portable view of the chest. COMPARISON: 4/27/2021 FINDINGS: Cardiac mediastinal contour is unchanged. Lungs show hypoinflation. No focal pulmonary consolidation. No pleural fluid collection or pneumothorax. RIGHT internal jugular catheter with tip at the superior RIGHT atrium, approximately 2 cm below the cavoatrial junction level. Endotracheal tube in place with tip approximately 2 cm above the jose. Feeding tube tip is off the image.     No significant change from prior exam.    DX-CHEST-PORTABLE (1 VIEW)    Result Date: 4/28/2021 4/27/2021 11:11 PM HISTORY/REASON FOR EXAM: For indication of respiratory failure; For indication of respiratory failure TECHNIQUE/EXAM DESCRIPTION:  Single AP view of the chest. COMPARISON: Yesterday FINDINGS: Position of medical devices appears stable. Cardiomegaly is observed. The mediastinal contour appears within normal limits.  Bilateral perihilar interstitial prominence and bronchial wall cuffing is noted. Bilateral lung volumes are diminished.  Hazy interstitial bilateral pulmonary opacities  are seen. No significant pleural effusions are identified. The bony structures appear age-appropriate.     1.  Interstitial pulmonary parenchymal prominence, compatible with interstitial edema and/or infiltrates, stable since prior study. 2.  Cardiomegaly    DX-CHEST-PORTABLE (1 VIEW)    Result Date: 4/27/2021 4/27/2021 2:28 AM HISTORY/REASON FOR EXAM: For indication of respiratory failure; For indication of respiratory failure TECHNIQUE/EXAM DESCRIPTION:  Single AP view of the chest. COMPARISON: Yesterday FINDINGS: Position of medical devices appears stable. The cardiac silhouette appears within normal limits. The mediastinal contour appears within normal limits.  Bilateral perihilar interstitial prominence and bronchial wall cuffing is noted. Bilateral lung volumes are diminished.  Hazy interstitial bilateral pulmonary opacities are seen. No significant pleural effusions are identified. The bony structures appear age-appropriate.     1.  Interstitial pulmonary parenchymal prominence, compatible with interstitial edema and/or infiltrates, stable since prior study.    DX-CHEST-PORTABLE (1 VIEW)    Result Date: 4/26/2021 4/26/2021 8:15 PM HISTORY/REASON FOR EXAM: POST INTUBATION TECHNIQUE/EXAM DESCRIPTION:  Single AP view of the chest. COMPARISON: April 23, 2021 FINDINGS: Endotracheal tube terminates at the level of the clavicles.     Otherwise medical device position is stable. The cardiac silhouette appears within normal limits. The mediastinal contour appears within normal limits.  Bilateral perihilar interstitial prominence and bronchial wall cuffing is noted. Bilateral lung volumes are diminished.  Hazy interstitial bilateral pulmonary opacities are seen. No significant pleural effusions are identified. The bony structures appear age-appropriate.     1.  Interstitial pulmonary parenchymal prominence, compatible with interstitial edema and/or infiltrates.    DX-CHEST-PORTABLE (1 VIEW)    Result Date:  4/20/2021 4/20/2021 6:21 PM HISTORY/REASON FOR EXAM:  Shortness of Breath. TECHNIQUE/EXAM DESCRIPTION AND NUMBER OF VIEWS: Single portable view of the chest. COMPARISON: 4/19/2021 FINDINGS: Single portable view of the chest demonstrates a stable cardiac silhouette and mediastinal contours. Bilateral patchy interstitial and airspace opacification has worsened from the prior exam. Lung volumes are low. No pneumothorax is identified. The endotracheal tube has been removed. Right internal jugular catheter and feeding tube are unchanged.     1.  Worsening bilateral interstitial and airspace opacification likely represent worsening interstitial edema. Pneumonia cannot be excluded. 2.  Status post extubation.    DX-CHEST-PORTABLE (1 VIEW)    Result Date: 4/19/2021 4/19/2021 2:15 AM HISTORY/REASON FOR EXAM:  For indication of respiratory failure; For indication of respiratory failure. TECHNIQUE/EXAM DESCRIPTION AND NUMBER OF VIEWS: Single portable view of the chest. COMPARISON: One day prior FINDINGS: Stable endotracheal and Dobbhoff tubes and right IJ central venous catheter. Cardiomediastinal silhouette is stable. Low lung volumes with mild bibasilar atelectasis. There is mild interstitial and hazy pulmonary opacities likely pulmonary edema. Correlate clinically for infection. No pleural effusion or pneumothorax. No acute osseous abnormality.     Cardiomegaly and probable mild pulmonary edema/CHF. No significant pleural effusion. Stable lines and tubes.    DX-CHEST-PORTABLE (1 VIEW)    Result Date: 4/18/2021 4/18/2021 12:54 AM HISTORY/REASON FOR EXAM: For indication of respiratory failure; For indication of respiratory failure TECHNIQUE/EXAM DESCRIPTION:  Single AP view of the chest. COMPARISON: Yesterday FINDINGS: Position of medical devices appears stable. Cardiomegaly is observed. The mediastinal contour appears within normal limits.  The central  pulmonary vasculature appears prominent and indistinct. Bilateral  lung volumes are diminished.  Diffuse scattered hazy pulmonary parenchymal opacities are seen. No significant pleural effusions are identified. The bony structures appear age-appropriate.     1.  Pulmonary edema and/or infiltrates are identified, which are stable since the prior exam. 2.  Cardiomegaly    DX-CHEST-PORTABLE (1 VIEW)    Result Date: 4/17/2021 4/17/2021 9:54 AM HISTORY/REASON FOR EXAM:  Shortness of breath and possible aspiration TECHNIQUE/EXAM DESCRIPTION AND NUMBER OF VIEWS: Single portable view of the chest. COMPARISON: None FINDINGS: Endotracheal tube is noted at the level of the lower clavicles. Right central line is noted at the SVC. Feeding tube extends below the diaphragm. Heart size is mildly enlarged. No focal infiltrates or consolidations are identified in the lungs. Perihilar opacifications and poorly defined lower lobe opacifications are noted. No pleural fluid collections are identified. No pneumothorax is appreciated.     1.  Mild cardiomegaly. 2.  Mild perihilar and lower lobe opacifications could be due to pulmonary edema versus inflammation or infection. 3.  No consolidations identified.    US-ABDOMEN COMPLETE SURVEY    Result Date: 4/17/2021 4/17/2021 6:47 PM HISTORY/REASON FOR EXAM:  Abnormal Labs Pain TECHNIQUE/EXAM DESCRIPTION AND NUMBER OF VIEWS:  Complete abdomen survey. COMPARISON: None FINDINGS: Liver is diffusely echogenic.  No gross mass. The liver measures 29.32 cm. Gallbladder is partially contracted.  No stone demonstrated.  Echogenic material suggests sludge. Gallbladder wall measures 7.00 mm. The common duct measures 6.00 mm. The pancreas is obscured by bowel gas. The aorta is obscured by bowel gas. Intrahepatic IVC is patent. The portal vein is patent with hepatopetal flow. The MPV measures 1.63 cm. The right kidney measures 13.06 cm. The left kidney measures 13.20 cm. There is no hydronephrosis. The spleen measures 11.54 cm maximally. The bladder is decompressed.  Small volume ascites.     1.  Enlarged echogenic liver suggesting fatty infiltration. 2.  Probable gallbladder sludge.  No gallstone demonstrated. 3.  Gallbladder wall thickening likely due to nondistended state. 4.  No biliary dilation. 5.  Small volume ascites. 6.  Limited evaluation of pancreas, abdominal aorta and urinary bladder.     US-EXTREMITY VENOUS LOWER BILAT    Result Date: 2021   Vascular Laboratory  CONCLUSIONS  No prior study is available for comparison.  Bilateral lower extremities - No superficial or deep venous thrombosis in  the veins visualized.  ALEJANDRO RIGGINS  Exam Date:     2021 14:39  Room #:     Inpatient  Priority:     Routine  Ht (in):             Wt (lb):  Ordering Physician:        JOHNNY MALLORY  Referring Physician:       [id number] Laura  Sonographer:               Elpidio Crum RDCS, RVT  Study Type:                Complete Bilateral  Technical Quality:         Adequate  Age:    40    Gender:     M  MRN:    [id number]  :    1980      BSA:  Indications:     Edema  CPT Codes:       24237  ICD Codes:       782.3  History:  Limitations:  PROCEDURES:  Bilateral lower extremity venous duplex imaging.  The following venous structures were evaluated: common femoral, profunda  femoral, greater saphenous, femoral, popliteal , peroneal and posterior  tibial veins.  Serial compression, augmentation maneuvers,  color and spectral Doppler  flow evaluations were performed.  FINDINGS:  Bilateral lower extremities -.  The right peroneal veins are not well visualized.  All other vessles demonstrate complete color filling and compressibility  with normal venous flow dynamics including spontaneous flow, response to  augmentation maneuvers, and respiratory phasicity.  No superficial or deep venous thrombosis.  Shelbi Glass MD  (Electronically Signed)  Final Date:      2021                   18:15    CT-CTA CHEST PULMONARY ARTERY W/ RECONS    Result Date:  4/26/2021 4/26/2021 3:49 PM HISTORY/REASON FOR EXAM:  PE suspected, high pretest prob Chest pain TECHNIQUE/EXAM DESCRIPTION: CT angiogram scan for pulmonary embolism with contrast, with reconstructions. 1.25 mm helical sections were obtained from the lung apices through the lung bases following the rapid bolus administration of 100 mL of Omnipaque 350 nonionic contrast. Thin-section overlapping reconstruction interval was utilized. Coronal reconstructions were generated from the axial data. MIP post processing was performed and utilized for the interpretation. Low dose optimization technique was utilized for this CT exam including automated exposure control and adjustment of the mA and/or kV according to patient size. COMPARISON: None FINDINGS: Neck Base:  Normal. Lungs/Pleura: There are dependent pulmonary opacities which probably represent atelectasis. There are additional small patchy slightly nodular right upper lobe opacities and hazy bilateral groundglass opacities in the upper lobes. Mild infectious/inflammatory etiology is possible. No significant pleural effusion or pneumothorax. Cardiovascular Structures: Study is degraded by motion artifact and contrast bolus timing. No intraluminal filling defect is identified to suggest pulmonary embolus to the segmental level. Right-sided central venous catheter tip projects near the cavoatrial junction. Central pulmonary arteries are normal in caliber. Heart size is normal. No pericardial effusion. Aorta is normal in caliber without aneurysm or dissection. Mediastinum/ lymph nodes: No lymphadenopathy. Upper Abdomen:  Diffuse hypoattenuation of the liver suggesting fatty infiltration. Soft tissues/wall: Within normal limits. Bones:  No acute or aggressive abnormality.     1.  No evidence of pulmonary embolism. 2.  Mild patchy/nodular and groundglass opacities in the upper lobes may represent mild infectious/inflammatory etiology. 3.  Dependent opacities likely  atelectasis. No pleural effusion. 4.  Hepatomegaly and hepatic steatosis.     DX-ABDOMEN FOR TUBE PLACEMENT    Result Date: 4/27/2021 4/27/2021 11:10 PM HISTORY/REASON FOR EXAM: Dobbhoff tube placement TECHNIQUE/EXAM DESCRIPTION:  Single AP view the abdomen. COMPARISON:  April 20, 2021 FINDINGS: Linear densities in the left lung base are noted. Blunting of the left costophrenic angle is seen. Dobbhoff tube is seen, the tip overlies the lumbar spine.  The bowel gas pattern appears nonspecific. The bony structures appear age-appropriate.     1.  Nonspecific bowel gas pattern. 2.  Dobbhoff tube tip terminates overlying the expected location of the gastric antrum. 3.  Left basilar atelectasis or infiltrate with trace left pleural effusion    DX-ABDOMEN FOR TUBE PLACEMENT    Result Date: 4/20/2021 4/20/2021 10:57 PM HISTORY/REASON FOR EXAM:  Tube placement TECHNIQUE/EXAM DESCRIPTION AND NUMBER OF VIEWS:  1 view(s) of the abdomen. COMPARISON:  4/20/2021 FINDINGS: Enteric tube has been placed. The tip projects over the stomach. The bowel gas pattern is within normal limits.     Feeding tube tip projects over the stomach.    DX-ABDOMEN FOR TUBE PLACEMENT    Result Date: 4/20/2021 4/20/2021 6:22 PM HISTORY/REASON FOR EXAM:  Tube evaluation. TECHNIQUE/EXAM DESCRIPTION AND NUMBER OF VIEWS:  1 view(s) of the abdomen. COMPARISON:  None. FINDINGS: Limited single view of the abdomen performed primarily to evaluate enteric tube position. The tip projects over the expected area of the first portion duodenum. The bowel gas pattern is within normal limits.     Feeding tube with tip projecting over the expected area of the first portion duodenum.    DX-ABDOMEN FOR TUBE PLACEMENT    Result Date: 4/17/2021 4/17/2021 9:54 AM HISTORY/REASON FOR EXAM:  Line evaluation. TECHNIQUE/EXAM DESCRIPTION AND NUMBER OF VIEWS:  1 view(s) of the abdomen. COMPARISON:  None. FINDINGS: Enteric tube has been placed. The tip projects over the gastric  "antrum. The bowel gas pattern is within normal limits.     Feeding tube extends below the diaphragm with tip at the level of the gastric antrum.      Micro:  Results     Procedure Component Value Units Date/Time    Fungal Culture - BAL [[id number]] Collected: 04/27/21 1131    Order Status: Completed Specimen: Respirate from Bronchoalveolar Lavage Updated: 04/29/21 0944     Significant Indicator NEG     Source RESP     Site BRONCHOALVEOLAR LAVAGE     Culture Result Culture in progress.    Narrative:      Special Contact [id number] RODO RADHA E.  Special Contact [id number] RODO RADHA E.    Fungal Smear - BAL [[id number]] Collected: 04/27/21 1131    Order Status: Completed Specimen: Respirate from Bronchoalveolar Lavage Updated: 04/29/21 0944     Significant Indicator NEG     Source RESP     Site BRONCHOALVEOLAR LAVAGE     Fungal Smear Results No fungal elements seen.    Narrative:      Special Contact [id number] RODO RDAHA E.  Special Contact [id number] RODO RADHA E.    Culture Respiratory W/ Grm Stn - BAL [[id number]] Collected: 04/27/21 1131    Order Status: Completed Specimen: Respirate from Bronchoalveolar Lavage Updated: 04/29/21 0944     Significant Indicator NEG     Source RESP     Site BRONCHOALVEOLAR LAVAGE     Culture Result Rare growth usual upper respiratory sailaja including yeast.  No clinically significant Staphylococcus aureus, Methicillin  Resistant Staphylococcus aureus, or Pseudomonas species  isolated.       Gram Stain Result Few WBCs.  No organisms seen.      Narrative:      Special Contact [id number] RODO RADHA E.  Special Contact [id number] RODO RADHA E.    BLOOD CULTURE [[id number]] Collected: 04/29/21 0313    Order Status: Completed Specimen: Blood from Peripheral Updated: 04/29/21 0318    Narrative:      Collected By:[id number] DEMETRIUS DIXON  Per Hospital Policy: Only change Specimen Src: to \"Line\" if  specified by " "physician order.    BLOOD CULTURE [[id number]] Collected: 04/29/21 0313    Order Status: Completed Specimen: Blood from Peripheral Updated: 04/29/21 0318    Narrative:      Collected By:[id number] DEMETRIUS DIXON  Per Hospital Policy: Only change Specimen Src: to \"Line\" if  specified by physician order.    CULTURE RESPIRATORY W/ GRM STN [[id number]] Collected: 04/26/21 0849    Order Status: Completed Specimen: Respirate from Sputum Updated: 04/28/21 0618     Significant Indicator NEG     Source RESP     Site SPUTUM     Culture Result Light growth usual upper respiratory sailaja  including yeast.  No clinically significant Staphylococcus aureus, Methicillin  Resistant Staphylococcus aureus, or Pseudomonas species  isolated.       Gram Stain Result Many WBCs.  Few Gram positive cocci.  Rare Gram positive rods.  Rare Yeast.  Specimen Quality Score: 1+      Narrative:      Collected By:[id number] BRYCE WEST  Collected By:[id number] BRYCE WEST    GRAM STAIN [[id number]] Collected: 04/27/21 1131    Order Status: Completed Specimen: Respirate Updated: 04/27/21 1824     Significant Indicator .     Source RESP     Site BRONCHOALVEOLAR LAVAGE     Gram Stain Result Few WBCs.  No organisms seen.      Narrative:      Special Contact [id number] RODO RAHDA E.  Special Contact [id number] RODO RADHA E.    Acid Fast Stain [[id number]] Collected: 04/27/21 1131    Order Status: Completed Specimen: Respirate Updated: 04/27/21 1824     Significant Indicator NEG     Source RESP     Site BRONCHOALVEOLAR LAVAGE     AFB Smear Results No acid fast bacilli seen.    Narrative:      Special Contact [id number] RODO RADHA E.  Special Contact [id number] RODO RADHA E.    C Diff by PCR rflx Toxin [[id number]] Collected: 04/27/21 0908    Order Status: Completed Specimen: Stool Updated: 04/27/21 1226     C Diff by PCR Negative     Comment: C. difficile NOT detected by PCR.  Treatment not indicated per " "guidelines.  Repeat testing not indicated within 7 days.          027-NAP1-BI Presumptive Negative     Comment: Presumptive 027/NAP1/BI target DNA sequences are NOT DETECTED.       Narrative:      Special Contact [id number] RODO RAMIREZ  Does this patient have risk factors for C-diff?->Yes  C-Diff Risk Factors->antibiotic exposure    AFB Culture - BAL [[id number]] Collected: 04/27/21 1131    Order Status: Canceled Specimen: Respirate from Bronchoalveolar Lavage     AFB Culture [[id number]] Collected: 04/27/21 1131    Order Status: No result Specimen: Other     BLOOD CULTURE [[id number]]     Order Status: Canceled Specimen: Blood from Peripheral     BLOOD CULTURE [[id number]]     Order Status: Canceled Specimen: Blood from Peripheral     BLOOD CULTURE [[id number]] Collected: 04/26/21 0840    Order Status: Completed Specimen: Blood from Peripheral Updated: 04/27/21 0831     Significant Indicator NEG     Source BLD     Site PERIPHERAL     Culture Result No Growth  Note: Blood cultures are incubated for 5 days and  are monitored continuously.Positive blood cultures  are called to the RN and reported as soon as  they are identified.      Narrative:      Collected By:[id number] BRYCE WEST  Per Hospital Policy: Only change Specimen Src: to \"Line\" if  specified by physician order.  Left Forearm/Arm    BLOOD CULTURE [[id number]] Collected: 04/26/21 0845    Order Status: Completed Specimen: Blood from Peripheral Updated: 04/27/21 0831     Significant Indicator NEG     Source BLD     Site PERIPHERAL     Culture Result No Growth  Note: Blood cultures are incubated for 5 days and  are monitored continuously.Positive blood cultures  are called to the RN and reported as soon as  they are identified.      Narrative:      Collected By:[id number] BRYCE WEST  Per Hospital Policy: Only change Specimen Src: to \"Line\" if  specified by physician order.  Right Forearm/Arm    URINALYSIS [[id number]]  (Abnormal) Collected: " 04/26/21 1456    Order Status: Completed Specimen: Urine, Clean Catch Updated: 04/26/21 1602     Color Yellow     Character Clear     Specific Gravity 1.010     Ph 7.0     Glucose Negative mg/dL      Ketones Negative mg/dL      Protein Negative mg/dL      Bilirubin Large     Urobilinogen, Urine 0.2     Nitrite Negative     Leukocyte Esterase Negative     Occult Blood Negative     Micro Urine Req see below     Comment: Microscopic examination not performed when specimen is clear  and chemically negative for protein, blood, leukocyte esterase  and nitrite.         Narrative:      Collected By:[id number] BRYCE WEST    MRSA By PCR (Amp) [[id number]] Collected: 04/26/21 0743    Order Status: Completed Specimen: Respirate from Nares Updated: 04/26/21 1343     Significant Indicator NEG     Source RESP     Site NARES     MRSA PCR Negative for MRSA by PCR.    Narrative:      Collected By:[id number] BRYCE WEST  Collected By:[id number] BRYCE WEST    GRAM STAIN [[id number]] Collected: 04/26/21 0849    Order Status: Completed Specimen: Respirate Updated: 04/26/21 1226     Significant Indicator .     Source RESP     Site SPUTUM     Gram Stain Result Many WBCs.  Few Gram positive cocci.  Rare Gram positive rods.  Rare Yeast.  Specimen Quality Score: 1+      Narrative:      Collected By:[id number] BRYCE WEST  Collected By:[id number] BRYCE WEST    S. Aureus By PCR, Nasal Complete [[id number]]     Order Status: Canceled Specimen: Respirate from Respiratory     BLOOD CULTURE [[id number]] Collected: 04/24/21 1312    Order Status: Completed Specimen: Blood from Peripheral Updated: 04/25/21 0726     Significant Indicator NEG     Source BLD     Site PERIPHERAL     Culture Result No Growth  Note: Blood cultures are incubated for 5 days and  are monitored continuously.Positive blood cultures  are called to the RN and reported as soon as  they are identified.      Narrative:      Collected By:[id number] ISAIAS LOJA  [facility]  "Policy: Only change Specimen Src: to \"Line\" if  specified by physician order.  Left Forearm/Arm    BLOOD CULTURE [[id number]] Collected: 04/24/21 1312    Order Status: Completed Specimen: Blood from Peripheral Updated: 04/25/21 0726     Significant Indicator NEG     Source BLD     Site PERIPHERAL     Culture Result No Growth  Note: Blood cultures are incubated for 5 days and  are monitored continuously.Positive blood cultures  are called to the RN and reported as soon as  they are identified.      Narrative:      Collected By:[id number] ISAIAS LOJA  Per Hospital Policy: Only change Specimen Src: to \"Line\" if  specified by physician order.  Right Forearm/Arm    Blood Culture [[id number]] Collected: 04/17/21 1427    Order Status: Completed Specimen: Blood from Peripheral Updated: 04/22/21 1700     Significant Indicator NEG     Source BLD     Site PERIPHERAL     Culture Result No growth after 5 days of incubation.    Narrative:      Collected By:[id number] CLINTON ROONEY  From different peripheral sites, if not done within the last  24 hours (Per Hospital Policy: Only change specimen source to  \"Line\" if specified by physician order)  Left Forearm/Arm    Blood Culture [[id number]] Collected: 04/17/21 1427    Order Status: Completed Specimen: Blood from Peripheral Updated: 04/22/21 1700     Significant Indicator NEG     Source BLD     Site PERIPHERAL     Culture Result No growth after 5 days of incubation.    Narrative:      Collected By:[id number] CLINTON ROONEY  From different peripheral sites, if not done within the last  24 hours (Per Hospital Policy: Only change specimen source to  \"Line\" if specified by physician order)  Right Forearm/Arm          Assessment:  Irving Ivory is a 40 y.o. male with a history of morbid obesity, alcoholism, transferred from [facility] where he presented for alcohol abuse, reported alcoholic pancreatitis, subsequently going into alcohol withdrawal syndrome, complicated " by worsening AMS, high CIWA score, requiring intubation for airway protection.  After intubation, patient required norepinephrine infusion, was transferred to [facility] on 4/17.  He was extubated on 4/19.     Patient with ongoing need for pressors, persistent fevers now high-grade, with increasing neutrophilic leukocytosis.  Patient also with ongoing alcoholic hepatitis with T. bili of 14, worsening JEYSON, epistaxis requiring posterior nasal packing. Blood cultures x2 no growth till date, BAL cultures no growth till date, CTA chest with no obvious evidence of infection, C. difficile negative.     Pertinent Diagnoses:  Shock, septic and hypovolemic +/- distributive  Neutrophilic leukocytosis  JEYSON, worsening  Alcoholic hepatitis  Epistaxis, acute blood loss anemia  Alcohol withdrawal  Morbid obesity  Reported alcoholic pancreatitis    Plan:  -Worsening clinical status overnight, hemoglobin continues to trend down, upper airway bleeding continues, white count up to 29, increasing pressor requirements, worsening renal function, T bili up to 14.4.  Overnight, patient received more FFP's and more blood transfusions and Zyvox was also added  -Continue IV Zosyn and Zyvox for now  -CT abdomen and pelvis with no significant fluid collections, abscesses, hematomas.  Noted bilateral atelectasis versus pneumonia changes.  Scattered ascites  -HIV and hepatitis panel negative  -Blood cultures x2 from 4/24 no growth to date    Agree with goals of care discussions.  Poor prognosis    Discussed with internal medicine, Dr. Molina    ID will follow.  Please call with questions.

## 2021-04-29 NOTE — PROGRESS NOTES
Critical Care Progress Note    Date of admission  4/17/2021    Chief Complaint  Respiratory failure, encephalopathy    Hospital Course  40-year-old male with BMI 39 transferred from [facility] where he was admitted several days ago prior to admission for pancreatitis and alcohol abuse subsequently going into alcohol withdrawal syndrome.  He required multiple doses of Ativan and had a CIWA score of 25, worsening AMS, intubated and transferred here for further management. Post intubation, pt became hypotensive, started on NE gtt. Lipase 800, CT A/P with no evidence of complications of pancreatitis.     4/17 intubated at OSH  4/18 s/p bronched due to increased secretions.   4/19 extubated    4/26: low dose pressors, hemoptysis. Reintubated at night for failure to clear thick bloody secretions and hypoxia.   4/27: bronch no source of bleed in airways, VD2 8/50%, spiking fevers, switch precedex to propofol, C. Diff negative.       Interval Problem Update  Reviewed last 24 hour events:  Neuro: followed commands  HR: SR   SBP: vaso and levo 15  Tmax: 38.4  GI:  last night.  Trickle feeds today while on high dose pressors  I/O: largely anuric now. 10cc this morning  Lines: l femoral TLC, PIV.  D/c R IJ and culture the tip.  curtis  Mobility: contraindicated d/t bleeding instability  Resp: VD 4, 12/100%.  Rate to 30 last night  Vte: contraindicated  PPI/H2:pepcid  Antibx: zosyn, zyvox added last night.  ID consulting    2U PRBC, 2U PPF overnight  Ongoing epistaxis after  fent 150/prop 30  Ongoing hyperglycemia-increase to medium sliding scale    Review of Systems  Review of Systems   Unable to perform ROS: Intubated        Vital Signs for last 24 hours   Temp:  [37.3 °C (99.1 °F)-38.5 °C (101.3 °F)] 37.3 °C (99.1 °F)  Pulse:  [] 94  Resp:  [19-33] 30  BP: ()/(36-62) 107/55  SpO2:  [89 %-100 %] 93 %    Hemodynamic parameters for last 24 hours       Respiratory Information for the last 24  hours  Vent Mode: APVCMV  Rate (breaths/min): 30  Vt Target (mL): 420  PEEP/CPAP: 12  MAP: 18  Control VTE (exp VT): 418    Physical Exam   Physical Exam  Constitutional:       Appearance: He is ill-appearing.      Interventions: He is sedated and intubated.   HENT:      Nose:      Comments: Cor track     Mouth/Throat:      Mouth: Mucous membranes are dry.      Comments: Ongoing blood being suctioned from oropharynx  Eyes:      General: Scleral icterus present.      Pupils: Pupils are equal, round, and reactive to light.   Cardiovascular:      Rate and Rhythm: Regular rhythm. Tachycardia present.      Pulses: Normal pulses.   Pulmonary:      Effort: No respiratory distress. He is intubated.      Breath sounds: Rhonchi present.   Abdominal:      General: Bowel sounds are normal. There is distension.      Tenderness: There is no abdominal tenderness.   Musculoskeletal:      Right lower leg: Edema present.      Left lower leg: Edema present.   Skin:     Coloration: Skin is jaundiced.   Neurological:      Comments: Following commands   Psychiatric:      Comments: Unable to assess         Medications  Current Facility-Administered Medications   Medication Dose Route Frequency Provider Last Rate Last Admin   • Linezolid (ZYVOX) premix 600 mg  600 mg Intravenous Q12HRS Jeremy M Gonda, M.D.   Stopped at 04/29/21 0613   • vasopressin (VASOSTRICT) 20 Units in  mL Infusion  0.03 Units/min Intravenous Continuous Terrance Pierce Jr., D.O. 9 mL/hr at 04/29/21 0700 0.03 Units/min at 04/29/21 0700   • EPINEPHrine (Adrenalin) infusion 4 mg/250 mL (premix)  0-10 mcg/min Intravenous Continuous Terrance Pierce Jr., D.O.   Stopped at 04/28/21 2045   • EPINEPHRINE 1 MG/10ML INJ SOSY            • ATROPINE SULFATE 0.1 MG/ML INJ SOLN            • [START ON 4/30/2021] famotidine (PEPCID) tablet 20 mg  20 mg Enteral Tube DAILY Marilyn Molina M.D.        Or   • [START ON 4/30/2021] famotidine (PEPCID) injection 20 mg  20 mg Intravenous  DAILY Marilyn Molina M.D.       • propofol (DIPRIVAN) injection  0-60 mcg/kg/min Intravenous Continuous Marilyn Molina M.D. 17.1 mL/hr at 04/29/21 0900 25 mcg/kg/min at 04/29/21 0900   • piperacillin-tazobactam (ZOSYN) 4.5 g in  mL IVPB  4.5 g Intravenous Q8HRS Marilyn Molina M.D.   Stopped at 04/29/21 0800   • OLANZapine (ZYPREXA) tablet 5 mg  5 mg Enteral Tube Q EVENING Marilyn Molina M.D.   5 mg at 04/28/21 1826   • insulin regular (HumuLIN R,NovoLIN R) injection  1-6 Units Subcutaneous Q6HRS Marilyn Molina M.D.   1 Units at 04/29/21 0559    And   • dextrose 50% (D50W) injection 50 mL  50 mL Intravenous Q15 MIN PRN Marilyn Molina M.D.       • acetaminophen (Tylenol) tablet 650 mg  650 mg Enteral Tube Q4HRS PRN Marilyn Molina M.D.   650 mg at 04/28/21 0216   • Respiratory Therapy Consult   Nebulization Continuous RT SARIAH Basurto Jr..O.       • MD Alert...ICU Electrolyte Replacement per Pharmacy   Other PHARMACY TO DOSE SARIAH Basurto Jr..O.       • lidocaine (XYLOCAINE) 1 % injection 1-2 mL  1-2 mL Tracheal Tube Q30 MIN PRN SARIAH Basurto Jr..O.       • fentaNYL (SUBLIMAZE) injection 100 mcg  100 mcg Intravenous Q15 MIN PRN SARIAH Basurto Jr..O.   100 mcg at 04/28/21 1550    And   • fentaNYL (SUBLIMAZE) injection 200 mcg  200 mcg Intravenous Q15 MIN PRN SARIAH Basurto Jr..O.   100 mcg at 04/27/21 1800    And   • fentaNYL (SUBLIMAZE) 50 mcg/mL in 50mL (Continuous Infusion)   Intravenous Continuous SARIAH Basurto Jr..O. 3 mL/hr at 04/29/21 0900 150 mcg/hr at 04/29/21 0900   • haloperidol lactate (HALDOL) injection 2-5 mg  2-5 mg Intravenous Q4HRS PRN Eleuterio Treviño M.D.   5 mg at 04/25/21 2004   • potassium bicarbonate (KLYTE) effervescent tablet 50 mEq  50 mEq Enteral Tube DAILY Miky Gama D.O.   Stopped at 04/29/21 0600   • albuterol (PROVENTIL) 2.5mg/0.5ml nebulizer solution 2.5 mg  2.5 mg Nebulization Q4H PRN (RT) Evin Hammer M.D.   2.5 mg at 04/20/21 1947    • Pharmacy Consult: Enteral tube insertion - review meds/change route/product selection  1 Each Other PHARMACY TO DOSE Jeremy M Gonda, M.D.       • norepinephrine (Levophed) infusion 8 mg/250 mL (premix)  0-30 mcg/min Intravenous Continuous Jeremy M Gonda, M.D. 28.1 mL/hr at 04/29/21 0900 15 mcg/min at 04/29/21 0900   • thiamine (Vitamin B-1) tablet 100 mg  100 mg Enteral Tube DAILY Radha Beckett M.D.   100 mg at 04/29/21 0600   • folic acid (FOLVITE) tablet 1 mg  1 mg Enteral Tube DAILY Radha Beckett M.D.   1 mg at 04/29/21 0513       Fluids    Intake/Output Summary (Last 24 hours) at 4/29/2021 1016  Last data filed at 4/29/2021 0615  Gross per 24 hour   Intake 8209.57 ml   Output 1569 ml   Net 6640.57 ml       Laboratory  Recent Labs     04/27/21  0448 04/28/21  0338 04/29/21  0500   ISTATAPH 7.508* 7.333* 7.249*   ISTATAPCO2 39.0* 54.3* 55.0*   ISTATAPO2 111* 101* 78   ISTATATCO2 32 30 26   UFHRKNL3QZC 99 97 93   ISTATARTHCO3 31.0* 28.8* 24.1   ISTATARTBE 7* 3 -3   ISTATTEMP 102.0 F 38.3 C 37.6 C   ISTATFIO2 60 100 100   ISTATSPEC Arterial Arterial Arterial   ISTATAPHTC 7.479 7.315* 7.241*   IUYQLHSK5PL 124* 110* 81         Recent Labs     04/26/21  1850 04/26/21  1850 04/27/21  0545 04/27/21  1225 04/29/21  0048 04/29/21  0558 04/29/21  0848   SODIUM 158*   < > 151*   < > 144 140 143   POTASSIUM 3.6   < > 4.1   < > 5.3 5.4 4.8   CHLORIDE 119*   < > 115*   < > 104 103 106   CO2 31   < > 28   < > 22 21 19*   BUN 31*   < > 46*   < > 66* 67* 65*   CREATININE 0.89   < > 1.32   < > 3.78* 3.79* 4.39*   MAGNESIUM 2.9*  --  2.9*  --  2.6*  --   --    PHOSPHORUS 2.3*  --  1.8*  --  5.7*  --   --    CALCIUM 8.1*   < > 7.9*   < > 7.1* 7.3* 7.3*    < > = values in this interval not displayed.     Recent Labs     04/27/21  0545 04/27/21  1225 04/28/21  0035 04/28/21  0455 04/29/21  0048 04/29/21  0558 04/29/21  0848   ALTSGPT 70*  --  65*  --  44  --   --    ASTSGOT 176*  --  142*  --  102*  --   --     ALKPHOSPHAT 106*  --  90  --  72  --   --    TBILIRUBIN 14.0*  --  14.2*  --  14.4*  --   --    GLUCOSE 170*   < > 154*   < > 203* 203* 175*    < > = values in this interval not displayed.     Recent Labs     04/27/21  0545 04/27/21  1800 04/28/21  0035 04/28/21 0223 04/28/21  0223 04/28/21  1835 04/29/21  0048 04/29/21  0848   WBC 19.9*   < >  --  27.8*   < > 29.5* 28.7* 29.0*   NEUTSPOLYS 75.60*  --   --  60.80  --   --  72.30*  --    LYMPHOCYTES 15.60*  --   --  12.20*  --   --  9.30*  --    MONOCYTES 5.70  --   --  3.50  --   --  4.20  --    EOSINOPHILS 0.30  --   --  0.90  --   --  0.80  --    BASOPHILS 0.40  --   --  0.90  --   --  0.00  --    ASTSGOT 176*  --  142*  --   --   --  102*  --    ALTSGPT 70*  --  65*  --   --   --  44  --    ALKPHOSPHAT 106*  --  90  --   --   --  72  --    TBILIRUBIN 14.0*  --  14.2*  --   --   --  14.4*  --     < > = values in this interval not displayed.     Recent Labs     04/27/21 0545 04/27/21  1800 04/28/21  1835 04/29/21 0048 04/29/21  0848   RBC 2.53*   < > 2.00* 2.01* 2.81*   HEMOGLOBIN 8.5*   < > 6.7* 6.7* 9.0*   HEMATOCRIT 28.4*   < > 23.4* 22.6* 29.4*   PLATELETCT 488*   < > 398 347 320   PROTHROMBTM 23.3*  --   --   --   --    INR 2.00*  --   --   --   --     < > = values in this interval not displayed.       Imaging  X-Ray:  I have personally reviewed the images and compared with prior images.    Assessment/Plan  * Septic shock ([facility])- (present on admission)  Assessment & Plan  Unclear source  Ongoing fevers with rising WBC  Rising pressor requirement is concerning for       Continue zosyn, zyvox  Unclear source.  Blood, urine, sputum, C. Diff negative. No wounds. Changed central line 4/28. Non-con CT abdomen no source.   Had some purulent drainage c/w sinusitis per ENT, now drained  Empiric addition of doxy and micafungin due to severe critical illness and unknown source of infection    Acute renal failure (ARF) ([facility])  Assessment & Plan  Oliguric  HRS versus  sepsis related  I added albumin for possible HRS yesterday.  DC'd today as he is receiving lots of blood products  MAP greater than 65  Renal consult  Would not be a candidate for outpatient dialysis as he is not a liver transplant candidate, however could be a candidate for short-term inpatient dialysis    Epistaxis  Assessment & Plan  Ongoing bleeding  ENT has seen/treated and recommends product for reversal of coagulopathy  Trend TEG, transfuse as needed  Hgb q8h      Acute encephalopathy  Assessment & Plan  Was initially d/t EtOH withdrawal  Now likely metabolic d/t liver disease, toxic d/t benzos and steroids and ICU delirium  Hold lactulose for diarrhea and hypernatremia  olanzapine for agitated delirium  Sedation with propofol/fent  Delirium precautions    Acute respiratory failure with hypoxia (HCC)- (present on admission)  Assessment & Plan  Intubated at outside hospital for aspiration and hypoxia  4/18 s/p bronch due to increased secretions.   4/20 Extubated   reintubated 4/26 for thick, bloody secretions  Hypoxia likely d/t aspiration pneumonitis vs PNA    Lung protective ventilation  All appropriate vent bundles  Will need decreased secretions to be able to extubate again  Worsening oxygenation , probably due to aspirated blood from epistaxis-->control of epistaxis as below    Alcoholic hepatitis- (present on admission)  Assessment & Plan  D/c prednisolone as the data supporting use in acute alcoholic hepatitis is not robust, and I feel the risks of steroids (delirium, weakness, infections) outweigh the benefits at this time      Hypernatremia- (present on admission)  Assessment & Plan  Improving, decrease free water  Trend Q6      Aspiration pneumonitis (HCC)  Assessment & Plan  Completed 5d augmentin      Pancreatitis- (present on admission)  Assessment & Plan  Questionable ?lipase at the outside hospital was 800, however CT abdomen showed no signs of acute pancreatitis   Possibly triggered by alcohol    Repeat lipase here was 412  Monitor    Abdominal distension  Assessment & Plan  No ascites on POCUS    Alcoholic cirrhosis (HCC)  Assessment & Plan  MELD score is 29, indicating 19.6% 3 month mortality  Not a current transplant candidate due to active EtOH abuse    Hemoptysis  Assessment & Plan  Ongoing thick bloody secretions  Pt was intubated for failure to manage these secretions  No significant blood on bronch  Source of this is epistaxis    Coagulopathy (HCC)  Assessment & Plan  Due to liver disease  Replete vitamin K    Thrombocytosis (HCC)  Assessment & Plan  Possibly brewing infection  Trend    Anemia  Assessment & Plan  Macrocytic, probably d/t liver disease and EtOH    Hypophonia with hoarseness  Assessment & Plan  Re-evaluate once extubated    Goals of care, counseling/discussion  Assessment & Plan  4/19 discussed with Bryon, brother and sister in law on the phone and updated pt's critical condition. Mother is coming tonight. All questions were answered  4/20 mother was at bedside and I updated her on patient's condition. All questions were answered  4/22 mother was at bedside and I updated her on patient's condition. All questions were answered  4/23 had a long discussion with brother and mother to update about patient's critical condition.  4/28 discussed with mom again about how much worse he is getting.  Recommended DNR as I do not think CPR would be beneficial in this situation (patient is not a transplant candidate, and this is all a result of his liver disease and alcohol). Mom is in agreement but wants to discuss with patient's brothers  4/29: Family would like patient to remain full code.    Hypophosphatemia- (present on admission)  Assessment & Plan  Severe  Continue to agressively replete    Alcohol withdrawal (HCC)- (present on admission)  Assessment & Plan  Out of withdrawal window  Cont vitamins    Alcohol abuse- (present on admission)  Assessment & Plan  Since the age of 16 years as per  the documentation  Pt is actively drinking. 12packs of beer daily and a pint of hard liquor daily.    Last drink was before admission   Needs alcohol cessation counseling and resources on discharge       VTE:  Contraindicated  Ulcer: H2 Antagonist  Lines: Central Line  Ongoing indication addressed and Sotomayor Catheter  Ongoing indication addressed    I have performed a physical exam and reviewed and updated ROS and Plan today (4/29/2021). In review of yesterday's note (4/28/2021), there are no changes except as documented above.     Discussed patient condition and risk of morbidity and/or mortality with Family, RN, RT, Pharmacy, Charge nurse / hot rounds, Patient and infectious disease and nephrology     The patient remains critically ill on ventilator, vasopressors infusions.  Critical care time = 50 minutes in directly providing and coordinating critical care and extensive data review.  No time overlap and excludes procedures.

## 2021-04-30 NOTE — FLOWSHEET NOTE
Ventilator Daily Summary    Vent Day # 5  8 @ 26    APVCMV: 30/420/+14/75%    Plan: Continue current ventilator settings and wean mechanical ventilation as tolerated per physician orders.

## 2021-04-30 NOTE — DIETARY
Nutrition Services: Day 13 of admit.  Irving Ivory is a 40 y.o. male with admitting DX of Alcohol withdrawal delirium (HCC)    TF last @ goal @ 1800 on 4/28 per I/O.  TF held to evaluate for GIB.  Trickle TF resumed @ 1100 yesterday (4/29).  Propofol @ 45 mcg = 30.8 ml = 813 kcal/day.  Levo @ 16, vaso @ 0.03.    RD following for TF advancement. Current goal rate is 65 ml/hr, but that may change depending on propofol infusion.

## 2021-04-30 NOTE — PROGRESS NOTES
Critical Care Progress Note    Date of admission  4/17/2021    Chief Complaint  Respiratory failure, encephalopathy    Hospital Course  40-year-old male with BMI 39 transferred from [facility] where he was admitted several days ago prior to admission for pancreatitis and alcohol abuse subsequently going into alcohol withdrawal syndrome.  He required multiple doses of Ativan and had a CIWA score of 25, worsening AMS, intubated and transferred here for further management. Post intubation, pt became hypotensive, started on NE gtt. Lipase 800, CT A/P with no evidence of complications of pancreatitis.     4/17 intubated at OSH  4/18 s/p bronched due to increased secretions.   4/19 extubated    4/26: low dose pressors, hemoptysis. Reintubated at night for failure to clear thick bloody secretions and hypoxia.   4/27: bronch no source of bleed in airways, VD2 8/50%, spiking fevers, switch precedex to propofol, C. Diff negative.   4/28: epistaxis ongoing, ENT consult. Worsening shock. 12/100%. Worsening JEYSON. Still febrile. ID consult. Palliative consulted.   4/29: Increasing pressors needs. Anuric. Still epistaxis overnight, transfusing FFP, PRBC. 14/100%. Renal consult. Family wishes full code.       Interval Problem Update  Reviewed last 24 hour events:  Neuro: wakes up, opens eyes to voice, not following commands.  HR: NSR  SBP: levo 20, vaso. Diffuse anasarca, grade 4 edema  Tmax: AF  GI: TF, cor track. BMS  I/O: 35 out total  Lines: L femoral TLC, PIV  Mobility: contraindicated d/t hemodynamic and respiratory instability  Resp: VD5, 14/75%. SBT not done while on 14/100% this am.  Vte: contraindicated  PPI/H2:pepcid  Antibx: zosyn, micafungin, zyvox, doxy    Epistaxis resolved. Ongoing yellow secretions  Replete calcium  Double concentrate NE  Family meeting at 1400    Review of Systems  Review of Systems   Unable to perform ROS: Intubated        Vital Signs for last 24 hours   Temp:  [36.5 °C (97.7  °F)-37.3 °C (99.1 °F)] 36.5 °C (97.7 °F)  Pulse:  [] 91  Resp:  [15-37] 33  BP: ()/(32-66) 112/54  SpO2:  [92 %-100 %] 95 %    Hemodynamic parameters for last 24 hours       Respiratory Information for the last 24 hours  Vent Mode: APVCMV  Rate (breaths/min): 30  Vt Target (mL): 420  PEEP/CPAP: 14  MAP: 20  Control VTE (exp VT): 415    Physical Exam   Physical Exam  Constitutional:       Appearance: He is ill-appearing.      Interventions: He is sedated and intubated.   HENT:      Mouth/Throat:      Mouth: Mucous membranes are dry.      Comments: Resolution of blood from oropharynx  Eyes:      General: Scleral icterus present.      Pupils: Pupils are equal, round, and reactive to light.   Cardiovascular:      Rate and Rhythm: Regular rhythm. Tachycardia present.      Pulses: Normal pulses.   Pulmonary:      Effort: No respiratory distress. He is intubated.      Breath sounds: Rhonchi present.   Abdominal:      General: There is distension.      Tenderness: There is no abdominal tenderness.      Comments: Absent bowel sounds   Musculoskeletal:      Right lower leg: Edema present.      Left lower leg: Edema present.   Skin:     Coloration: Skin is jaundiced.   Neurological:      Comments: Sedated on my exam.  Per RN no longer following commands off sedation.     Psychiatric:      Comments: Unable to assess         Medications  Current Facility-Administered Medications   Medication Dose Route Frequency Provider Last Rate Last Admin   • Linezolid (ZYVOX) premix 600 mg  600 mg Intravenous Q12HRS Jeremy M Gonda, M.D. 300 mL/hr at 04/30/21 0640 600 mg at 04/30/21 0640   • insulin regular (HumuLIN R,NovoLIN R) injection  2-9 Units Subcutaneous Q6HRS Marilyn Molina M.D.   2 Units at 04/30/21 0602    And   • glucose 4 g chewable tablet 16 g  16 g Oral Q15 MIN PRN Marilyn Molina M.D.        And   • dextrose 50% (D50W) injection 50 mL  50 mL Intravenous Q15 MIN PRN Marilyn Molina M.D.       • micafungin  (MYCAMINE) 100 mg in dextrose 5% 100 mL ivpb  100 mg Intravenous Q24HRS Marilyn Molina M.D. 100 mL/hr at 04/30/21 0617 100 mg at 04/30/21 0617   • doxycycline (VIBRAMYCIN) 100 mg in  mL IVPB  100 mg Intravenous Q12HRS Marilyn Molina M.D.   Stopped at 04/30/21 0646   • vasopressin (VASOSTRICT) 20 Units in  mL Infusion  0.03 Units/min Intravenous Continuous SARIAH Basurto Jr..OIsiah 9 mL/hr at 04/30/21 0547 0.03 Units/min at 04/30/21 0547   • EPINEPHrine (Adrenalin) infusion 4 mg/250 mL (premix)  0-10 mcg/min Intravenous Continuous SARIAH Basurto Jr..OIsiah   Stopped at 04/28/21 2045   • famotidine (PEPCID) tablet 20 mg  20 mg Enteral Tube DAILY Marilyn Molina M.D.   20 mg at 04/30/21 0547    Or   • famotidine (PEPCID) injection 20 mg  20 mg Intravenous DAILY Marilyn Molina M.D.       • propofol (DIPRIVAN) injection  0-60 mcg/kg/min Intravenous Continuous Marilyn Molina M.D. 30.8 mL/hr at 04/30/21 0545 45 mcg/kg/min at 04/30/21 0545   • piperacillin-tazobactam (ZOSYN) 4.5 g in  mL IVPB  4.5 g Intravenous Q8HRS Marilyn Molina M.D. 25 mL/hr at 04/30/21 0546 4.5 g at 04/30/21 0546   • OLANZapine (ZYPREXA) tablet 5 mg  5 mg Enteral Tube Q EVENING Marilyn Molina M.D.   5 mg at 04/29/21 1738   • acetaminophen (Tylenol) tablet 650 mg  650 mg Enteral Tube Q4HRS PRN Marilyn Molina M.D.   650 mg at 04/28/21 0216   • Respiratory Therapy Consult   Nebulization Continuous RT Terrance Pierce Jr. D.O.       • MD Alert...ICU Electrolyte Replacement per Pharmacy   Other PHARMACY TO DOSE Terrance Pierce Jr. D.O.       • lidocaine (XYLOCAINE) 1 % injection 1-2 mL  1-2 mL Tracheal Tube Q30 MIN PRN SARIAH Basurto Jr..O.       • fentaNYL (SUBLIMAZE) injection 100 mcg  100 mcg Intravenous Q15 MIN PRN SARIAH Basurto Jr..O.   100 mcg at 04/28/21 1550    And   • fentaNYL (SUBLIMAZE) injection 200 mcg  200 mcg Intravenous Q15 MIN PRN SARIAH Basurto Jr..O.   100 mcg at 04/27/21 1800    And   • fentaNYL  (SUBLIMAZE) 50 mcg/mL in 50mL (Continuous Infusion)   Intravenous Continuous CIERA Basurto Jr.OIsiah 3 mL/hr at 04/30/21 0335 150 mcg/hr at 04/30/21 0335   • haloperidol lactate (HALDOL) injection 2-5 mg  2-5 mg Intravenous Q4HRS PRN Eleuterio Treviño M.D.   5 mg at 04/25/21 2004   • potassium bicarbonate (KLYTE) effervescent tablet 50 mEq  50 mEq Enteral Tube DAILY Miky Gama D.O.   50 mEq at 04/30/21 0547   • albuterol (PROVENTIL) 2.5mg/0.5ml nebulizer solution 2.5 mg  2.5 mg Nebulization Q4H PRN (RT) Evin Hammer M.D.   2.5 mg at 04/20/21 0758   • Pharmacy Consult: Enteral tube insertion - review meds/change route/product selection  1 Each Other PHARMACY TO DOSE Jeremy M Gonda, M.D.       • norepinephrine (Levophed) infusion 8 mg/250 mL (premix)  0-30 mcg/min Intravenous Continuous Jeremy M Gonda, M.D. 28.1 mL/hr at 04/30/21 0605 15 mcg/min at 04/30/21 0605   • thiamine (Vitamin B-1) tablet 100 mg  100 mg Enteral Tube DAILY Radha Beckett M.D.   100 mg at 04/30/21 0547   • folic acid (FOLVITE) tablet 1 mg  1 mg Enteral Tube DAILY Radha Beckett M.D.   1 mg at 04/30/21 0547       Fluids    Intake/Output Summary (Last 24 hours) at 4/30/2021 0737  Last data filed at 4/30/2021 0617  Gross per 24 hour   Intake 5144.93 ml   Output 430 ml   Net 4714.93 ml       Laboratory  Recent Labs     04/29/21  0500 04/30/21  0322 04/30/21  0349   ISTATAPH 7.249* 7.292* 7.368*   ISTATAPCO2 55.0* 40.1* 31.3   ISTATAPO2 78 50* 78   ISTATATCO2 26 21 19*   JJPRKTN5ZIN 93 81* 95   ISTATARTHCO3 24.1 19.4 18.0   ISTATARTBE -3 -7* -6*   ISTATTEMP 37.6 C 36.6 C 37.4 C   ISTATFIO2 100 100 40   ISTATSPEC Arterial Arterial Arterial   ISTATAPHTC 7.241* 7.298* 7.362*   [id number] 81 49* 80         Recent Labs     04/29/21  0048 04/29/21  0558 04/29/21  1721 04/29/21  2330 04/30/21  0553   SODIUM 144   < > 137 139 137   POTASSIUM 5.3   < > 4.9 4.7 5.0   CHLORIDE 104   < > 101 101 101   CO2 22   < > 19* 20 18*   BUN 66*    < > 69* 71* 73*   CREATININE 3.78*   < > 5.05* 5.46* 5.62*   MAGNESIUM 2.6*  --   --   --   --    PHOSPHORUS 5.7*  --   --   --   --    CALCIUM 7.1*   < > 7.4* 7.5* 7.5*    < > = values in this interval not displayed.     Recent Labs     04/28/21  0035 04/28/21  0455 04/29/21  0048 04/29/21  0558 04/29/21  1721 04/29/21  2330 04/30/21  0553   ALTSGPT 65*  --  44  --   --  40  --    ASTSGOT 142*  --  102*  --   --  104*  --    ALKPHOSPHAT 90  --  72  --   --  78  --    TBILIRUBIN 14.2*  --  14.4*  --   --  17.6*  --    GLUCOSE 154*   < > 203*   < > 161* 161* 157*    < > = values in this interval not displayed.     Recent Labs     04/27/21  1800 04/28/21  0035 04/28/21  0223 04/28/21  1835 04/29/21  0048 04/29/21  0848 04/29/21  1721 04/29/21 2330 04/30/21  0553   WBC   < >  --  27.8*   < > 28.7*   < > 25.5* 28.0* 28.7*   NEUTSPOLYS  --   --  60.80  --  72.30*  --   --   --  77.20*   LYMPHOCYTES  --   --  12.20*  --  9.30*  --   --   --  6.10*   MONOCYTES  --   --  3.50  --  4.20  --   --   --  3.50   EOSINOPHILS  --   --  0.90  --  0.80  --   --   --  0.00   BASOPHILS  --   --  0.90  --  0.00  --   --   --  0.90   ASTSGOT  --  142*  --   --  102*  --   --  104*  --    ALTSGPT  --  65*  --   --  44  --   --  40  --    ALKPHOSPHAT  --  90  --   --  72  --   --  78  --    TBILIRUBIN  --  14.2*  --   --  14.4*  --   --  17.6*  --     < > = values in this interval not displayed.     Recent Labs     04/29/21  1721 04/29/21  2330 04/30/21  0553   RBC 2.67* 2.91* 2.79*   HEMOGLOBIN 8.7* 9.5* 9.3*   HEMATOCRIT 27.6* 29.6* 28.5*   PLATELETCT 305 319 312       Imaging  X-Ray:  I have personally reviewed the images and compared with prior images.    Assessment/Plan  * Septic shock (HCC)- (present on admission)  Assessment & Plan  Unclear source  Improving fever curse, ongoing leukocytosis  Vasopressin/levoph for MAP >65       -Continue zosyn, zyvox, micafungin, doxy  -Unclear source.  Blood, urine, sputum, C. Diff negative. No  wounds. Changed central line 4/28. Non-con CT abdomen no source.   -Had some purulent drainage c/w sinusitis per ENT, now drained.  He was on zosyn which should have covered sinusitis, so it's less likely that was the only culprit source  -Empiric addition of doxy and micafungin due to severe critical illness and unknown source of infection    Acute renal failure (ARF) (HCC)  Assessment & Plan  Oliguric  HRS versus sepsis related  Received albumin then blood product for oncotic pressure to attempt to reverse HRS  MAP greater than 65  Renal consult  Would not be a candidate for outpatient dialysis as he is not a liver transplant candidate, however could be a candidate for short-term inpatient dialysis  Family conversation at 1400 today    Epistaxis  Assessment & Plan  Bleeding finally resolved after ENT intervention and 4U FFP  Hgb q12  If bleeds again, TEG guided therapy      Acute encephalopathy  Assessment & Plan  Was initially d/t EtOH withdrawal  Now likely metabolic d/t liver disease, toxic d/t benzos and steroids and ICU delirium  Hold lactulose for diarrhea and hypernatremia  olanzapine for agitated delirium  Sedation with propofol/fent  Delirium precautions    Acute respiratory failure with hypoxia (HCC)- (present on admission)  Assessment & Plan  Intubated at outside hospital for aspiration and hypoxia  4/18 s/p bronch due to increased secretions.   4/20 Extubated   reintubated 4/26 for thick, bloody secretions, hypoxia and failure to manage airway  Hypoxia likely d/t aspiration pneumonitis vs PNA vs pneumonitis from epistaxis    Lung protective ventilation  All appropriate vent bundles      Alcoholic hepatitis- (present on admission)  Assessment & Plan  D/c prednisolone as the data supporting use in acute alcoholic hepatitis is not robust, and I feel the risks of steroids (delirium, weakness, infections) outweigh the benefits at this time      Hypernatremia- (present on admission)  Assessment &  Plan  Improving  Trend q12      Aspiration pneumonitis (HCC)  Assessment & Plan  Completed 5d augmentin      Pancreatitis- (present on admission)  Assessment & Plan  Questionable ?lipase at the outside hospital was 800, however CT abdomen showed no signs of acute pancreatitis   Possibly triggered by alcohol   Repeat lipase here was 412  Monitor    Abdominal distension  Assessment & Plan  No ascites on POCUS    Alcoholic cirrhosis (HCC)  Assessment & Plan  MELD score is 29, indicating 19.6% 3 month mortality  Not a current transplant candidate due to active EtOH abuse    Hemoptysis  Assessment & Plan  Pt was intubated for failure to manage these secretions  No significant blood on bronch  Source of this is was epistaxis    Coagulopathy (HCC)  Assessment & Plan  Due to liver disease  Replete vitamin K    Thrombocytosis (HCC)  Assessment & Plan  Trend    Anemia  Assessment & Plan  Macrocytic, probably d/t liver disease and EtOH  Required transfusions for acute blood loss anemia d/t epistaxis    Hypophonia with hoarseness  Assessment & Plan  Re-evaluate once extubated    Goals of care, counseling/discussion  Assessment & Plan  4/19 discussed with Bryon, brother and sister in law on the phone and updated pt's critical condition. Mother is coming tonight. All questions were answered  4/20 mother was at bedside and I updated her on patient's condition. All questions were answered  4/22 mother was at bedside and I updated her on patient's condition. All questions were answered  4/23 had a long discussion with brother and mother to update about patient's critical condition.  4/28 discussed with mom again about how much worse he is getting.  Recommended DNR as I do not think CPR would be beneficial in this situation (patient is not a transplant candidate, and this is all a result of his liver disease and alcohol). Mom is in agreement but wants to discuss with patient's brothers  4/29: Family would like patient to remain full  code.    Hypophosphatemia- (present on admission)  Assessment & Plan  Severe  Continue to agressively replete    Alcohol withdrawal (HCC)- (present on admission)  Assessment & Plan  Out of withdrawal window  Cont vitamins    Alcohol abuse- (present on admission)  Assessment & Plan  Since the age of 16 years as per the documentation  Pt is actively drinking. 12packs of beer daily and a pint of hard liquor daily.    Last drink was before admission   Needs alcohol cessation counseling and resources on discharge       VTE:  Contraindicated  Ulcer: H2 Antagonist  Lines: Central Line  Ongoing indication addressed and Sotomayor Catheter  Ongoing indication addressed    I have performed a physical exam and reviewed and updated ROS and Plan today (4/30/2021). In review of yesterday's note (4/29/2021), there are no changes except as documented above.     Discussed patient condition and risk of morbidity and/or mortality with Family, RN, RT, Pharmacy, Charge nurse / hot rounds, Patient and infectious disease and nephrology     The patient remains critically ill on ventilator, vasopressors infusions.  Critical care time = 60 minutes in directly providing and coordinating critical care and extensive data review.  No time overlap and excludes procedures.    Update: I had a family meeting with mother milagros, brothers connie and winifred on the phone, and palliative care.  I updated them about clinical condition. I explained that even with dialysis, Irving's mortality is exceedingly high and he is extremely unlikely to ever leave the hospital.  He is not a transplant candidate due to active drinking. I answered questions.  Family discussed among themselves and then communicated to Lorna from palliative care that they do not want to proceed with HD.  Patient will be DNR, but they would like time to come see him and say goodbye so we will continue all other aggressive measures at this time.

## 2021-04-30 NOTE — PALLIATIVE CARE
Spiritual Care Note    Patient Information     Patient's Name: Irving Ivory   MRN: [id number]    YOB: 1980   Age and Gender: 40 y.o. male   Service Area: [facility]   Room (and Bed): [place] [address]   Ethnicity or Nationality:     Primary Language: English   Mormon/Spiritual preference: Not able or decline to answer   Place of Residence: [place]   Family/Friends/Others Present: Yes   Clinical Team Present: Yes   Medical Diagnosis(-es)/Procedure(s): Alcohol Withdrawal Delirium    Code Status: DNR    Date of Admission: 4/17/2021   Length of Stay: 13 days        Spiritual Care Provider Information:  Name of Spiritual Care Provider: Nicholas Curry  Title of Spiritual Care Provider: Associate   Phone Number: [phone]  E-mail: [email or web address]  Total time : 15 minutes    Spiritual Screen Results:    Gen Nursing        Palliative Care  PC Mormon/Spiritual Screening  Is your spiritual health or inner well-being important to you as you cope with your medical condition?: Yes  Would you like to receive a visit from our Spiritual Care team or your own Muslim or spiritual leader?: Yes      Encounter/Request Information    Encounter/Request Type   Visited With: Patient not available, Family, Patient    Nature of the Visit: Initial, On shift    Crisis Visit: Critical care    Referral From/ Origin of Request: Epic nursing    Religous Needs/Values    Mormon Needs Visit  Mormon Needs: Prayer    Spiritual Assessment     Spiritual Care Encounters  Interacton/Conversation: Pt was unresponsive. A member of the Pt's care  the  to the Pt's mother who was on the phone with another of her sons. Pt's mother thanked  and asked if he could say a prayer for her son.  conveyed his sympathy and spoke prayer over the Pt and the Pt's family    Intended Effects: Helping Someone Feel Comforted, Promote a Sense of Peace, Demonstrate Caring and  Concern    Interventions: Compassionate Presence, Grief Consolement, Prayer     Notes:
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
## 2021-04-30 NOTE — THERAPY
Missed Therapy     Patient Name: Irving Ivory  Age:  40 y.o., Sex:  male  Medical Record #: [id number]  Today's Date: 4/30/2021 04/30/21 1400   Treatment Variance   Reason For Missed Therapy Medical - Patient Is Not Medically Stable   Interdisciplinary Plan of Care Collaboration   IDT Collaboration with  Nursing   Collaboration Comments Patient intubated, sedated, and hypotensive. Not appropriate for PT today. Will hold.

## 2021-04-30 NOTE — CARE PLAN
Problem: Communication  Goal: The ability to communicate needs accurately and effectively will improve  Outcome: PROGRESSING AS EXPECTED     Problem: Safety  Goal: Will remain free from injury  Outcome: PROGRESSING AS EXPECTED  Goal: Will remain free from falls  Outcome: PROGRESSING AS EXPECTED     Problem: Infection  Goal: Will remain free from infection  Outcome: PROGRESSING AS EXPECTED     Problem: Venous Thromboembolism (VTW)/Deep Vein Thrombosis (DVT) Prevention:  Goal: Patient will participate in Venous Thrombosis (VTE)/Deep Vein Thrombosis (DVT)Prevention Measures  Outcome: PROGRESSING AS EXPECTED     Problem: Bowel/Gastric:  Goal: Normal bowel function is maintained or improved  Outcome: PROGRESSING AS EXPECTED  Goal: Will not experience complications related to bowel motility  Outcome: PROGRESSING AS EXPECTED     Problem: Knowledge Deficit  Goal: Knowledge of disease process/condition, treatment plan, diagnostic tests, and medications will improve  Outcome: PROGRESSING AS EXPECTED  Goal: Knowledge of the prescribed therapeutic regimen will improve  Outcome: PROGRESSING AS EXPECTED     Problem: Discharge Barriers/Planning  Goal: Patient's continuum of care needs will be met  Outcome: PROGRESSING AS EXPECTED     Problem: Fluid Volume:  Goal: Will maintain balanced intake and output  Outcome: PROGRESSING AS EXPECTED     Problem: Respiratory:  Goal: Respiratory status will improve  Outcome: PROGRESSING AS EXPECTED     Problem: Skin Integrity  Goal: Risk for impaired skin integrity will decrease  Outcome: PROGRESSING AS EXPECTED     Problem: Safety - Medical Restraint  Goal: Remains free of injury from restraints (Restraint for Interference with Medical Device)  Description: INTERVENTIONS:  1. Determine that other, less restrictive measures have been tried or would not be effective before applying the restraint  2. Evaluate the patient's condition at the time of restraint application  3. Inform patient/family  regarding the reason for restraint  4. Q2H: Monitor safety, psychosocial status, comfort, nutrition and hydration  Outcome: PROGRESSING AS EXPECTED  Goal: Free from restraint(s) (Restraint for Interference with Medical Device)  Description: INTERVENTIONS:  1. ONCE/SHIFT or MINIMUM Q12H: Assess and document the continuing need for restraints  2. Q24H: Continued use of restraint requires LIP to perform face to face examination and written order  3. Identify and implement measures to help patient regain control  Outcome: PROGRESSING AS EXPECTED     Problem: Pain Management  Goal: Pain level will decrease to patient's comfort goal  Outcome: PROGRESSING AS EXPECTED     Problem: Psychosocial Needs:  Goal: Level of anxiety will decrease  Outcome: PROGRESSING AS EXPECTED     Problem: Urinary Elimination:  Goal: Ability to reestablish a normal urinary elimination pattern will improve  Outcome: PROGRESSING AS EXPECTED

## 2021-04-30 NOTE — PROGRESS NOTES
Infectious Disease Progress Note    Author: Chris Brar M.D. Date & Time of service: 2021  4:53 PM    Chief Complaint:  Follow-up for septic shock    Interval History:   T-max 101.3 last night, hemoglobin continues to trend down, upper airway bleeding continues, white count up to 29, increasing pressor requirements, worsening renal function, T bili up to 14.4.  Overnight, patient received more FFP's and more blood transfusions and Zyvox was also added.   last fever 100.8 early yesterday morning, afebrile since, white count 28.7.  Family meeting today.    Labs Reviewed and Medications Reviewed.    Review of Systems:  Review of Systems   Unable to perform ROS: Intubated       Hemodynamics:  Temp (24hrs), Av.6 °C (97.9 °F), Min:36.5 °C (97.7 °F), Max:36.7 °C (98.1 °F)  Temperature: 36.6 °C (97.9 °F), Monitored Temp: 36.6 °C (97.9 °F)  Pulse  Av.5  Min: 57  Max: 125   Blood Pressure: 122/51      Physical Exam:  Physical Exam  Vitals and nursing note reviewed.   Constitutional:       Appearance: He is ill-appearing.      Comments: Intubated, sedated, not following commands   HENT:      Mouth/Throat:      Comments: ET tube in place, no external ongoing bleeding noted  Eyes:      General: Scleral icterus present.   Cardiovascular:      Rate and Rhythm: Tachycardia present.      Heart sounds: No murmur.   Pulmonary:      Effort: Pulmonary effort is normal.      Breath sounds: Rales present. No wheezing.   Abdominal:      General: There is distension.      Comments: Firm, no wincing on deep palpation   Musculoskeletal:         General: No swelling or tenderness.   Skin:     Coloration: Skin is jaundiced.      Findings: No erythema or rash.   Neurological:      Comments: Intubated and sedated   Psychiatric:      Comments: Unable to assess         Meds:    Current Facility-Administered Medications:   •  lactulose  •  doxycycline monohydrate  •  linezolid  •  norepinephrine (Levophed) infusion  •   insulin regular **AND** POC blood glucose manual result **AND** NOTIFY MD and PharmD **AND** glucose **AND** dextrose 50%  •  micafungin  •  vasopressin (PITRESSIN) infusion  •  famotidine **OR** famotidine  •  propofol **AND** Triglycerides Starting now and then Every 3 Days  •  [COMPLETED] piperacillin-tazobactam **AND** piperacillin-tazobactam  •  OLANZapine  •  acetaminophen  •  Respiratory Therapy Consult  •  MD Alert...Adult ICU Electrolyte Replacement per Pharmacy  •  lidocaine  •  fentaNYL **AND** fentaNYL **AND** fentaNYL **AND** [DISCONTINUED] propofol **AND** [CANCELED] Triglyceride  •  haloperidol lactate  •  potassium bicarbonate  •  albuterol  •  Pharmacy  •  thiamine  •  folic acid    Labs:  Recent Labs     04/28/21  0223 04/28/21  1835 04/29/21  0048 04/29/21  0848 04/29/21  1721 04/29/21  2330 04/30/21  0553   WBC 27.8*   < > 28.7*   < > 25.5* 28.0* 28.7*   RBC 2.30*   < > 2.01*   < > 2.67* 2.91* 2.79*   HEMOGLOBIN 7.9*   < > 6.7*   < > 8.7* 9.5* 9.3*   HEMATOCRIT 26.6*   < > 22.6*   < > 27.6* 29.6* 28.5*   .7*   < > 112.4*   < > 103.4* 101.7* 102.2*   MCH 34.3*   < > 33.3*   < > 32.6 32.6 33.3*   RDW 94.1*   < > 88.3*   < > 81.4* 79.2* 78.5*   PLATELETCT 468*   < > 347   < > 305 319 312   MPV 11.5   < > 12.2   < > 12.2 12.2 11.9   NEUTSPOLYS 60.80  --  72.30*  --   --   --  77.20*   LYMPHOCYTES 12.20*  --  9.30*  --   --   --  6.10*   MONOCYTES 3.50  --  4.20  --   --   --  3.50   EOSINOPHILS 0.90  --  0.80  --   --   --  0.00   BASOPHILS 0.90  --  0.00  --   --   --  0.90   RBCMORPHOLO Present  --  Present  --   --   --  Present    < > = values in this interval not displayed.     Recent Labs     04/29/21  2330 04/30/21  0553 04/30/21  1120   SODIUM 139 137 138   POTASSIUM 4.7 5.0 5.4   CHLORIDE 101 101 100   CO2 20 18* 19*   GLUCOSE 161* 157* 172*   BUN 71* 73* 71*     Recent Labs     04/28/21  0035 04/28/21  0455 04/29/21  0048 04/29/21  0558 04/29/21  2330 04/30/21  0553 04/30/21  1120      ALBUMIN 2.8*  --  3.1*  --  3.4  --   --    TBILIRUBIN 14.2*  --  14.4*  --  17.6*  --   --    ALKPHOSPHAT 90  --  72  --  78  --   --    TOTPROTEIN 5.6*  --  5.6*  --  6.1  --   --    ALTSGPT 65*  --  44  --  40  --   --    ASTSGOT 142*  --  102*  --  104*  --   --    CREATININE 1.86*   < > 3.78*   < > 5.46* 5.62* 5.88*    < > = values in this interval not displayed.       Imaging:  CT-ABDOMEN-PELVIS W/O    Result Date: 4/29/2021 4/28/2021 10:47 PM HISTORY/REASON FOR EXAM: source of undifferentiated sepsis. Renal failure so can't do IV contrast TECHNIQUE/EXAM DESCRIPTION:  CT abdomen and pelvis without IV contrast. Sequential axial CT images were obtained from lung bases to the proximal femurs without contrast. Low dose optimization technique was utilized for this CT exam including automated exposure control and adjustment of the mA and/or kV according to patient size. COMPARISON: None CT ABDOMEN FINDINGS: Non contrast technique limits evaluation of the solid abdominal organs. Bilateral lung base consolidations with air bronchograms are seen. The liver is normal in contour. Hepatomegaly is observed. No intrahepatic biliary ductal dilatation is noted. The gallbladder appears within normal limits. The spleen is unremarkable. The pancreas is grossly normal. Bilateral adrenal glands appear within normal limits. The kidneys are unremarkable.  The ureters are normal in caliber along their visualized course. Scattered colonic diverticula are seen. The appendix is incompletely visualized, the visualized portions of the appendix appear grossly unremarkable. The small bowel is unremarkable. The bony structures are age appropriate. Scattered abdominal ascites is seen. CT PELVIS FINDINGS: Sotomayor catheter is seen within the bladder, otherwise the bladder is within normal limits. Fat-containing bilateral inguinal hernias are seen.     1.  Bilateral lung base consolidations with air bronchograms, could represent atelectasis  or infiltrate. 2.  Scattered abdominal ascites 3.  Fat-containing bilateral inguinal hernias 4.  Diverticulosis 5.  Hepatomegaly    DX-CHEST-LIMITED (1 VIEW)    Result Date: 4/23/2021 4/23/2021 9:15 AM HISTORY/REASON FOR EXAM: Fever and hypotension. TECHNIQUE/EXAM DESCRIPTION AND NUMBER OF VIEWS: Single AP view of the chest. COMPARISON: 4/20/2021 FINDINGS: There are multiple supportive devices again seen, stable. There is bilateral linear atelectasis. The heart is enlarged. There is no pleural effusion. There is elevation the right hemidiaphragm.     1.  Stable cardiomegaly. 2.  Enhancing patchy bilateral atelectasis, improved.    DX-CHEST-PORTABLE (1 VIEW)    Result Date: 4/28/2021 4/28/2021 12:20 PM HISTORY/REASON FOR EXAM:  Shortness of Breath. TECHNIQUE/EXAM DESCRIPTION AND NUMBER OF VIEWS: Single portable view of the chest. COMPARISON: 4/27/2021 FINDINGS: Cardiac mediastinal contour is unchanged. Lungs show hypoinflation. No focal pulmonary consolidation. No pleural fluid collection or pneumothorax. RIGHT internal jugular catheter with tip at the superior RIGHT atrium, approximately 2 cm below the cavoatrial junction level. Endotracheal tube in place with tip approximately 2 cm above the jose. Feeding tube tip is off the image.     No significant change from prior exam.    DX-CHEST-PORTABLE (1 VIEW)    Result Date: 4/28/2021 4/27/2021 11:11 PM HISTORY/REASON FOR EXAM: For indication of respiratory failure; For indication of respiratory failure TECHNIQUE/EXAM DESCRIPTION:  Single AP view of the chest. COMPARISON: Yesterday FINDINGS: Position of medical devices appears stable. Cardiomegaly is observed. The mediastinal contour appears within normal limits.  Bilateral perihilar interstitial prominence and bronchial wall cuffing is noted. Bilateral lung volumes are diminished.  Hazy interstitial bilateral pulmonary opacities are seen. No significant pleural effusions are identified. The bony structures appear  age-appropriate.     1.  Interstitial pulmonary parenchymal prominence, compatible with interstitial edema and/or infiltrates, stable since prior study. 2.  Cardiomegaly    DX-CHEST-PORTABLE (1 VIEW)    Result Date: 4/27/2021 4/27/2021 2:28 AM HISTORY/REASON FOR EXAM: For indication of respiratory failure; For indication of respiratory failure TECHNIQUE/EXAM DESCRIPTION:  Single AP view of the chest. COMPARISON: Yesterday FINDINGS: Position of medical devices appears stable. The cardiac silhouette appears within normal limits. The mediastinal contour appears within normal limits.  Bilateral perihilar interstitial prominence and bronchial wall cuffing is noted. Bilateral lung volumes are diminished.  Hazy interstitial bilateral pulmonary opacities are seen. No significant pleural effusions are identified. The bony structures appear age-appropriate.     1.  Interstitial pulmonary parenchymal prominence, compatible with interstitial edema and/or infiltrates, stable since prior study.    DX-CHEST-PORTABLE (1 VIEW)    Result Date: 4/26/2021 4/26/2021 8:15 PM HISTORY/REASON FOR EXAM: POST INTUBATION TECHNIQUE/EXAM DESCRIPTION:  Single AP view of the chest. COMPARISON: April 23, 2021 FINDINGS: Endotracheal tube terminates at the level of the clavicles.     Otherwise medical device position is stable. The cardiac silhouette appears within normal limits. The mediastinal contour appears within normal limits.  Bilateral perihilar interstitial prominence and bronchial wall cuffing is noted. Bilateral lung volumes are diminished.  Hazy interstitial bilateral pulmonary opacities are seen. No significant pleural effusions are identified. The bony structures appear age-appropriate.     1.  Interstitial pulmonary parenchymal prominence, compatible with interstitial edema and/or infiltrates.    DX-CHEST-PORTABLE (1 VIEW)    Result Date: 4/20/2021 4/20/2021 6:21 PM HISTORY/REASON FOR EXAM:  Shortness of Breath. TECHNIQUE/EXAM  DESCRIPTION AND NUMBER OF VIEWS: Single portable view of the chest. COMPARISON: 4/19/2021 FINDINGS: Single portable view of the chest demonstrates a stable cardiac silhouette and mediastinal contours. Bilateral patchy interstitial and airspace opacification has worsened from the prior exam. Lung volumes are low. No pneumothorax is identified. The endotracheal tube has been removed. Right internal jugular catheter and feeding tube are unchanged.     1.  Worsening bilateral interstitial and airspace opacification likely represent worsening interstitial edema. Pneumonia cannot be excluded. 2.  Status post extubation.    DX-CHEST-PORTABLE (1 VIEW)    Result Date: 4/19/2021 4/19/2021 2:15 AM HISTORY/REASON FOR EXAM:  For indication of respiratory failure; For indication of respiratory failure. TECHNIQUE/EXAM DESCRIPTION AND NUMBER OF VIEWS: Single portable view of the chest. COMPARISON: One day prior FINDINGS: Stable endotracheal and Dobbhoff tubes and right IJ central venous catheter. Cardiomediastinal silhouette is stable. Low lung volumes with mild bibasilar atelectasis. There is mild interstitial and hazy pulmonary opacities likely pulmonary edema. Correlate clinically for infection. No pleural effusion or pneumothorax. No acute osseous abnormality.     Cardiomegaly and probable mild pulmonary edema/CHF. No significant pleural effusion. Stable lines and tubes.    DX-CHEST-PORTABLE (1 VIEW)    Result Date: 4/18/2021 4/18/2021 12:54 AM HISTORY/REASON FOR EXAM: For indication of respiratory failure; For indication of respiratory failure TECHNIQUE/EXAM DESCRIPTION:  Single AP view of the chest. COMPARISON: Yesterday FINDINGS: Position of medical devices appears stable. Cardiomegaly is observed. The mediastinal contour appears within normal limits.  The central  pulmonary vasculature appears prominent and indistinct. Bilateral lung volumes are diminished.  Diffuse scattered hazy pulmonary parenchymal opacities are  seen. No significant pleural effusions are identified. The bony structures appear age-appropriate.     1.  Pulmonary edema and/or infiltrates are identified, which are stable since the prior exam. 2.  Cardiomegaly    DX-CHEST-PORTABLE (1 VIEW)    Result Date: 4/17/2021 4/17/2021 9:54 AM HISTORY/REASON FOR EXAM:  Shortness of breath and possible aspiration TECHNIQUE/EXAM DESCRIPTION AND NUMBER OF VIEWS: Single portable view of the chest. COMPARISON: None FINDINGS: Endotracheal tube is noted at the level of the lower clavicles. Right central line is noted at the SVC. Feeding tube extends below the diaphragm. Heart size is mildly enlarged. No focal infiltrates or consolidations are identified in the lungs. Perihilar opacifications and poorly defined lower lobe opacifications are noted. No pleural fluid collections are identified. No pneumothorax is appreciated.     1.  Mild cardiomegaly. 2.  Mild perihilar and lower lobe opacifications could be due to pulmonary edema versus inflammation or infection. 3.  No consolidations identified.    US-ABDOMEN COMPLETE SURVEY    Result Date: 4/17/2021 4/17/2021 6:47 PM HISTORY/REASON FOR EXAM:  Abnormal Labs Pain TECHNIQUE/EXAM DESCRIPTION AND NUMBER OF VIEWS:  Complete abdomen survey. COMPARISON: None FINDINGS: Liver is diffusely echogenic.  No gross mass. The liver measures 29.32 cm. Gallbladder is partially contracted.  No stone demonstrated.  Echogenic material suggests sludge. Gallbladder wall measures 7.00 mm. The common duct measures 6.00 mm. The pancreas is obscured by bowel gas. The aorta is obscured by bowel gas. Intrahepatic IVC is patent. The portal vein is patent with hepatopetal flow. The MPV measures 1.63 cm. The right kidney measures 13.06 cm. The left kidney measures 13.20 cm. There is no hydronephrosis. The spleen measures 11.54 cm maximally. The bladder is decompressed. Small volume ascites.     1.  Enlarged echogenic liver suggesting fatty infiltration. 2.   Probable gallbladder sludge.  No gallstone demonstrated. 3.  Gallbladder wall thickening likely due to nondistended state. 4.  No biliary dilation. 5.  Small volume ascites. 6.  Limited evaluation of pancreas, abdominal aorta and urinary bladder.     US-EXTREMITY VENOUS LOWER BILAT    Result Date: 2021   Vascular Laboratory  CONCLUSIONS  No prior study is available for comparison.  Bilateral lower extremities - No superficial or deep venous thrombosis in  the veins visualized.  ALEJANDRO RIGGINS  Exam Date:     2021 14:39  Room #:     Inpatient  Priority:     Routine  Ht (in):             Wt (lb):  Ordering Physician:        JOHNNY MALLORY  Referring Physician:       [id number] Laura  Sonographer:               Elpidio Crum RDCS, RVT  Study Type:                Complete Bilateral  Technical Quality:         Adequate  Age:    40    Gender:     M  MRN:    [id number]  :    1980      BSA:  Indications:     Edema  CPT Codes:       16615  ICD Codes:       782.3  History:  Limitations:  PROCEDURES:  Bilateral lower extremity venous duplex imaging.  The following venous structures were evaluated: common femoral, profunda  femoral, greater saphenous, femoral, popliteal , peroneal and posterior  tibial veins.  Serial compression, augmentation maneuvers,  color and spectral Doppler  flow evaluations were performed.  FINDINGS:  Bilateral lower extremities -.  The right peroneal veins are not well visualized.  All other vessles demonstrate complete color filling and compressibility  with normal venous flow dynamics including spontaneous flow, response to  augmentation maneuvers, and respiratory phasicity.  No superficial or deep venous thrombosis.  Shelbi Glass MD  (Electronically Signed)  Final Date:      2021                   18:15    CT-CTA CHEST PULMONARY ARTERY W/ RECONS    Result Date: 2021 3:49 PM HISTORY/REASON FOR EXAM:  PE suspected, high pretest prob Chest  pain TECHNIQUE/EXAM DESCRIPTION: CT angiogram scan for pulmonary embolism with contrast, with reconstructions. 1.25 mm helical sections were obtained from the lung apices through the lung bases following the rapid bolus administration of 100 mL of Omnipaque 350 nonionic contrast. Thin-section overlapping reconstruction interval was utilized. Coronal reconstructions were generated from the axial data. MIP post processing was performed and utilized for the interpretation. Low dose optimization technique was utilized for this CT exam including automated exposure control and adjustment of the mA and/or kV according to patient size. COMPARISON: None FINDINGS: Neck Base:  Normal. Lungs/Pleura: There are dependent pulmonary opacities which probably represent atelectasis. There are additional small patchy slightly nodular right upper lobe opacities and hazy bilateral groundglass opacities in the upper lobes. Mild infectious/inflammatory etiology is possible. No significant pleural effusion or pneumothorax. Cardiovascular Structures: Study is degraded by motion artifact and contrast bolus timing. No intraluminal filling defect is identified to suggest pulmonary embolus to the segmental level. Right-sided central venous catheter tip projects near the cavoatrial junction. Central pulmonary arteries are normal in caliber. Heart size is normal. No pericardial effusion. Aorta is normal in caliber without aneurysm or dissection. Mediastinum/ lymph nodes: No lymphadenopathy. Upper Abdomen:  Diffuse hypoattenuation of the liver suggesting fatty infiltration. Soft tissues/wall: Within normal limits. Bones:  No acute or aggressive abnormality.     1.  No evidence of pulmonary embolism. 2.  Mild patchy/nodular and groundglass opacities in the upper lobes may represent mild infectious/inflammatory etiology. 3.  Dependent opacities likely atelectasis. No pleural effusion. 4.  Hepatomegaly and hepatic steatosis.     DX-ABDOMEN FOR TUBE  PLACEMENT    Result Date: 4/27/2021 4/27/2021 11:10 PM HISTORY/REASON FOR EXAM: Dobbhoff tube placement TECHNIQUE/EXAM DESCRIPTION:  Single AP view the abdomen. COMPARISON:  April 20, 2021 FINDINGS: Linear densities in the left lung base are noted. Blunting of the left costophrenic angle is seen. Dobbhoff tube is seen, the tip overlies the lumbar spine.  The bowel gas pattern appears nonspecific. The bony structures appear age-appropriate.     1.  Nonspecific bowel gas pattern. 2.  Dobbhoff tube tip terminates overlying the expected location of the gastric antrum. 3.  Left basilar atelectasis or infiltrate with trace left pleural effusion    DX-ABDOMEN FOR TUBE PLACEMENT    Result Date: 4/20/2021 4/20/2021 10:57 PM HISTORY/REASON FOR EXAM:  Tube placement TECHNIQUE/EXAM DESCRIPTION AND NUMBER OF VIEWS:  1 view(s) of the abdomen. COMPARISON:  4/20/2021 FINDINGS: Enteric tube has been placed. The tip projects over the stomach. The bowel gas pattern is within normal limits.     Feeding tube tip projects over the stomach.    DX-ABDOMEN FOR TUBE PLACEMENT    Result Date: 4/20/2021 4/20/2021 6:22 PM HISTORY/REASON FOR EXAM:  Tube evaluation. TECHNIQUE/EXAM DESCRIPTION AND NUMBER OF VIEWS:  1 view(s) of the abdomen. COMPARISON:  None. FINDINGS: Limited single view of the abdomen performed primarily to evaluate enteric tube position. The tip projects over the expected area of the first portion duodenum. The bowel gas pattern is within normal limits.     Feeding tube with tip projecting over the expected area of the first portion duodenum.    DX-ABDOMEN FOR TUBE PLACEMENT    Result Date: 4/17/2021 4/17/2021 9:54 AM HISTORY/REASON FOR EXAM:  Line evaluation. TECHNIQUE/EXAM DESCRIPTION AND NUMBER OF VIEWS:  1 view(s) of the abdomen. COMPARISON:  None. FINDINGS: Enteric tube has been placed. The tip projects over the gastric antrum. The bowel gas pattern is within normal limits.     Feeding tube extends below the diaphragm  "with tip at the level of the gastric antrum.      Micro:  Results     Procedure Component Value Units Date/Time    CATH TIP CULTURE [[id number]] Collected: 04/29/21 1210    Order Status: Completed Specimen: Cath Tip from Central Line Updated: 04/30/21 0932     Significant Indicator NEG     Source CTIP     Site CENTRAL LINE     Culture Result <15 CFU's mixed skin sailaja.    Narrative:      Collected By:[id number] AUGUSTO FERGUSON  Condition of Cath Site:->CLEAR  Collected By:[id number] AUGUSTO FERGUSON    BLOOD CULTURE [[id number]] Collected: 04/29/21 0313    Order Status: Completed Specimen: Blood from Peripheral Updated: 04/30/21 0646     Significant Indicator NEG     Source BLD     Site PERIPHERAL     Culture Result No Growth  Note: Blood cultures are incubated for 5 days and  are monitored continuously.Positive blood cultures  are called to the RN and reported as soon as  they are identified.      Narrative:      Collected By:[id number] DEMETRIUS DIXON  Per Hospital Policy: Only change Specimen Src: to \"Line\" if  specified by physician order.  Right Forearm/Arm    BLOOD CULTURE [[id number]] Collected: 04/29/21 0313    Order Status: Completed Specimen: Blood from Peripheral Updated: 04/30/21 0646     Significant Indicator NEG     Source BLD     Site PERIPHERAL     Culture Result No Growth  Note: Blood cultures are incubated for 5 days and  are monitored continuously.Positive blood cultures  are called to the RN and reported as soon as  they are identified.      Narrative:      Collected By:[id number] DEMETRIUS DIXON  Per Hospital Policy: Only change Specimen Src: to \"Line\" if  specified by physician order.  Left Forearm/Arm    BLOOD CULTURE [[id number]] Collected: 04/24/21 1312    Order Status: Completed Specimen: Blood from Peripheral Updated: 04/29/21 1500     Significant Indicator NEG     Source BLD     Site PERIPHERAL     Culture Result No growth after 5 days of incubation.    Narrative:      Collected By:[id number] ISAIAS LOJA  Per " "Hospital Policy: Only change Specimen Src: to \"Line\" if  specified by physician order.  Left Forearm/Arm    BLOOD CULTURE [[id number]] Collected: 04/24/21 1312    Order Status: Completed Specimen: Blood from Peripheral Updated: 04/29/21 1500     Significant Indicator NEG     Source BLD     Site PERIPHERAL     Culture Result No growth after 5 days of incubation.    Narrative:      Collected By:[id number] ISAIAS LOJA  Per Hospital Policy: Only change Specimen Src: to \"Line\" if  specified by physician order.  Right Forearm/Arm    Fungal Culture - BAL [[id number]] Collected: 04/27/21 1131    Order Status: Completed Specimen: Respirate from Bronchoalveolar Lavage Updated: 04/29/21 0944     Significant Indicator NEG     Source RESP     Site BRONCHOALVEOLAR LAVAGE     Culture Result Culture in progress.    Narrative:      Special Contact [id number] RODO RADHA E.  Special Contact [id number] RODO RADHA E.    Fungal Smear - BAL [[id number]] Collected: 04/27/21 1131    Order Status: Completed Specimen: Respirate from Bronchoalveolar Lavage Updated: 04/29/21 0944     Significant Indicator NEG     Source RESP     Site BRONCHOALVEOLAR LAVAGE     Fungal Smear Results No fungal elements seen.    Narrative:      Special Contact [id number] RODO RADHA E.  Special Contact [id number] RODO RADHA E.    Culture Respiratory W/ Grm Stn - BAL [[id number]] Collected: 04/27/21 1131    Order Status: Completed Specimen: Respirate from Bronchoalveolar Lavage Updated: 04/29/21 0944     Significant Indicator NEG     Source RESP     Site BRONCHOALVEOLAR LAVAGE     Culture Result Rare growth usual upper respiratory sailaja including yeast.  No clinically significant Staphylococcus aureus, Methicillin  Resistant Staphylococcus aureus, or Pseudomonas species  isolated.       Gram Stain Result Few WBCs.  No organisms seen.      Narrative:      Special Contact [id number] RODO RADHA " E.  Special Contact [id number] RODO RADHA E.    CULTURE RESPIRATORY W/ GRM STN [[id number]] Collected: 04/26/21 0849    Order Status: Completed Specimen: Respirate from Sputum Updated: 04/28/21 0618     Significant Indicator NEG     Source RESP     Site SPUTUM     Culture Result Light growth usual upper respiratory sailaja  including yeast.  No clinically significant Staphylococcus aureus, Methicillin  Resistant Staphylococcus aureus, or Pseudomonas species  isolated.       Gram Stain Result Many WBCs.  Few Gram positive cocci.  Rare Gram positive rods.  Rare Yeast.  Specimen Quality Score: 1+      Narrative:      Collected By:[id number] BRYCE WEST  Collected By:[id number] BRYCE WEST    GRAM STAIN [[id number]] Collected: 04/27/21 1131    Order Status: Completed Specimen: Respirate Updated: 04/27/21 1824     Significant Indicator .     Source RESP     Site BRONCHOALVEOLAR LAVAGE     Gram Stain Result Few WBCs.  No organisms seen.      Narrative:      Special Contact [id number] RODO RADHA E.  Special Contact [id number] RODO RADHA E.    Acid Fast Stain [[id number]] Collected: 04/27/21 1131    Order Status: Completed Specimen: Respirate Updated: 04/27/21 1824     Significant Indicator NEG     Source RESP     Site BRONCHOALVEOLAR LAVAGE     AFB Smear Results No acid fast bacilli seen.    Narrative:      Special Contact [id number] RODO RADHA E.  Special Contact [id number] RODO RADHA E.    C Diff by PCR rflx Toxin [[id number]] Collected: 04/27/21 0908    Order Status: Completed Specimen: Stool Updated: 04/27/21 1226     C Diff by PCR Negative     Comment: C. difficile NOT detected by PCR.  Treatment not indicated per guidelines.  Repeat testing not indicated within 7 days.          027-NAP1-BI Presumptive Negative     Comment: Presumptive 027/NAP1/BI target DNA sequences are NOT DETECTED.       Narrative:      Special Contact [id number] RODO  "RADHA RAMIREZ  Does this patient have risk factors for C-diff?->Yes  C-Diff Risk Factors->antibiotic exposure    AFB Culture - BAL [[id number]] Collected: 04/27/21 1131    Order Status: Canceled Specimen: Respirate from Bronchoalveolar Lavage     AFB Culture [[id number]] Collected: 04/27/21 1131    Order Status: No result Specimen: Other     BLOOD CULTURE [[id number]]     Order Status: Canceled Specimen: Blood from Peripheral     BLOOD CULTURE [[id number]]     Order Status: Canceled Specimen: Blood from Peripheral     BLOOD CULTURE [[id number]] Collected: 04/26/21 0840    Order Status: Completed Specimen: Blood from Peripheral Updated: 04/27/21 0831     Significant Indicator NEG     Source BLD     Site PERIPHERAL     Culture Result No Growth  Note: Blood cultures are incubated for 5 days and  are monitored continuously.Positive blood cultures  are called to the RN and reported as soon as  they are identified.      Narrative:      Collected By:[id number] BRYCE WEST  Per Hospital Policy: Only change Specimen Src: to \"Line\" if  specified by physician order.  Left Forearm/Arm    BLOOD CULTURE [[id number]] Collected: 04/26/21 0845    Order Status: Completed Specimen: Blood from Peripheral Updated: 04/27/21 0831     Significant Indicator NEG     Source BLD     Site PERIPHERAL     Culture Result No Growth  Note: Blood cultures are incubated for 5 days and  are monitored continuously.Positive blood cultures  are called to the RN and reported as soon as  they are identified.      Narrative:      Collected By:[id number] BRYCE WEST  Per Hospital Policy: Only change Specimen Src: to \"Line\" if  specified by physician order.  Right Forearm/Arm    URINALYSIS [[id number]]  (Abnormal) Collected: 04/26/21 1456    Order Status: Completed Specimen: Urine, Clean Catch Updated: 04/26/21 1602     Color Yellow     Character Clear     Specific Gravity 1.010     Ph 7.0     Glucose Negative mg/dL      Ketones Negative mg/dL      Protein Negative " mg/dL      Bilirubin Large     Urobilinogen, Urine 0.2     Nitrite Negative     Leukocyte Esterase Negative     Occult Blood Negative     Micro Urine Req see below     Comment: Microscopic examination not performed when specimen is clear  and chemically negative for protein, blood, leukocyte esterase  and nitrite.         Narrative:      Collected By:[id number] BRYCE WEST    MRSA By PCR (Amp) [[id number]] Collected: 04/26/21 0743    Order Status: Completed Specimen: Respirate from Nares Updated: 04/26/21 1343     Significant Indicator NEG     Source RESP     Site NARES     MRSA PCR Negative for MRSA by PCR.    Narrative:      Collected By:[id number] BRYCE WEST  Collected By:[id number] BRYCE WEST    GRAM STAIN [[id number]] Collected: 04/26/21 0849    Order Status: Completed Specimen: Respirate Updated: 04/26/21 1226     Significant Indicator .     Source RESP     Site SPUTUM     Gram Stain Result Many WBCs.  Few Gram positive cocci.  Rare Gram positive rods.  Rare Yeast.  Specimen Quality Score: 1+      Narrative:      Collected By:[id number] BRYCE WEST  Collected By:[id number] BRYCE WEST    S. Aureus By PCR, Nasal Complete [[id number]]     Order Status: Canceled Specimen: Respirate from Respiratory           Assessment:  Irving Ivory is a 40 y.o. male with a history of morbid obesity, alcoholism, transferred from [facility] where he presented for alcohol abuse, reported alcoholic pancreatitis, subsequently going into alcohol withdrawal syndrome, complicated by worsening AMS, high CIWA score, requiring intubation for airway protection.  After intubation, patient required norepinephrine infusion, was transferred to [facility] on 4/17.  He was extubated on 4/19.     Patient with ongoing need for pressors, persistent fevers now high-grade, with increasing neutrophilic leukocytosis.  Patient also with ongoing alcoholic hepatitis with T. bili of 14, worsening JEYSON, epistaxis requiring posterior nasal  packing. Blood cultures x2 no growth till date, BAL cultures no growth till date, CTA chest with no obvious evidence of infection, C. difficile negative.     Pertinent Diagnoses:  Shock, septic and hypovolemic +/- distributive  Neutrophilic leukocytosis  JEYSON, worsening  Alcoholic hepatitis  Epistaxis, acute blood loss anemia  Alcohol withdrawal  Morbid obesity  Reported alcoholic pancreatitis    Plan:  -Worsening clinical status, acute blood loss anemia from epistaxis, white count persistently elevated, increased pressor requirements, worsening renal function, T bili now up to 17.   -Continue IV Zosyn and Zyvox for now.  Zyvox was added 4/29 due to worsening clinical status as above  -Primary team discussed with family who mentioned exposure to rats.  Oral doxycycline 100 mg twice daily added for better coverage of potential atypical pathogens like Leptospirosis.  Family also discussed CODE STATUS with family who wishes to continue aggressive measures and full code and added empiric IV micafungin  -CT abdomen and pelvis with no significant fluid collections, abscesses, hematomas.  Noted bilateral atelectasis versus pneumonia changes.  Scattered ascites  -HIV and hepatitis panel negative  -Blood cultures x2 from 4/24 no growth to date    Agree with goals of care discussions.  Family has decided against initiating dialysis. Poor prognosis    Discussed with internal medicine, Dr. Molina    ID will follow.  Please call with questions.

## 2021-05-01 NOTE — PROGRESS NOTES
Nephrology note  Patient with ETOH ESLD, JEYSON, VDRF, on pressors  In critical condition  As decided not to proceed for dialysis will sign off  Please call with questions  Thank you for the consult

## 2021-05-01 NOTE — PROGRESS NOTES
Critical Care Progress Note    Date of admission  4/17/2021    Chief Complaint  Respiratory failure, encephalopathy    Hospital Course  40-year-old male with BMI 39 transferred from [facility] where he was admitted several days ago prior to admission for pancreatitis and alcohol abuse subsequently going into alcohol withdrawal syndrome.  He required multiple doses of Ativan and had a CIWA score of 25, worsening AMS, intubated and transferred here for further management. Post intubation, pt became hypotensive, started on NE gtt. Lipase 800, CT A/P with no evidence of complications of pancreatitis.     4/17 intubated at OSH  4/18 s/p bronched due to increased secretions.   4/19 extubated    4/26: low dose pressors, hemoptysis. Reintubated at night for failure to clear thick bloody secretions and hypoxia.   4/27: bronch no source of bleed in airways, VD2 8/50%, spiking fevers, switch precedex to propofol, C. Diff negative.   4/28: epistaxis ongoing, ENT consult. Worsening shock. 12/100%. Worsening JEYSNO. Still febrile. ID consult. Palliative consulted.   4/29: Increasing pressors needs. Anuric. Still epistaxis overnight, transfusing FFP, PRBC. 14/100%. Renal consult. Family wishes full code.   4/30: 14/75%. Vaso and norepi 20. Anuric with rising K and dropping bicarb. Epistaxis finally stopped. Family conversation, they elected to not do HD but will continue full support otherwise while they travel here.  DNR.       Interval Problem Update  Reviewed last 24 hour events:  Neuro: opens eyes but doesn't follow, very dyssynchronous with vent off sedation so remains on sedation  HR: SR 90s  SBP: low 60s on vaso, levo 24  Tmax: AF  GI: cor track, peptamen, BMS. D/c lactulose excess output  I/O: anuric  Lines: femoral line, PIV. No art d/t goals of care  Prop 70  fent 200  Levo 24  Vaso 0.03  Mobility: contraindicated d/t clinical instability  Resp: 14/70%, no SBT d/t goals of care and instability   Vte:  SCD  PPI/H2:pepcid  Antibx: zosyn, zyvox, micafungin, doxy    Starting to bleed again-check TEG  Micro all negative  K up to 6.1. Had been on scheduled k-lyte, I d/c'ed this  Continuing current level of support so family can come say goodbye    Review of Systems  Review of Systems   Unable to perform ROS: Intubated        Vital Signs for last 24 hours   Temp:  [36.2 °C (97.2 °F)-36.7 °C (98.1 °F)] 36.5 °C (97.7 °F)  Pulse:  [81-95] 90  Resp:  [16-35] 25  BP: ()/(43-63) 108/46  SpO2:  [93 %-97 %] 94 %    Hemodynamic parameters for last 24 hours       Respiratory Information for the last 24 hours  Vent Mode: APVCMV  Rate (breaths/min): 30  Vt Target (mL): 420  PEEP/CPAP: 14  MAP: 19  Control VTE (exp VT): 378    Physical Exam   Physical Exam  Constitutional:       Appearance: He is ill-appearing.      Interventions: He is sedated and intubated.   HENT:      Mouth/Throat:      Mouth: Mucous membranes are dry.      Comments: Blood in oropharyn  Eyes:      General: Scleral icterus present.      Pupils: Pupils are equal, round, and reactive to light.   Cardiovascular:      Rate and Rhythm: Regular rhythm. Tachycardia present.      Pulses: Normal pulses.   Pulmonary:      Effort: No respiratory distress. He is intubated.      Breath sounds: Rhonchi present.   Abdominal:      General: There is distension.      Tenderness: There is no abdominal tenderness.      Comments: Absent bowel sounds   Musculoskeletal:      Right lower leg: Edema present.      Left lower leg: Edema present.      Comments: Diffuse anasarca   Skin:     Coloration: Skin is jaundiced.   Neurological:      Comments: Sedated on my exam.  Per RN no longer following commands off sedation.     Psychiatric:      Comments: Unable to assess         Medications  Current Facility-Administered Medications   Medication Dose Route Frequency Provider Last Rate Last Admin   • lactulose 20 GM/30ML solution 30 mL  30 mL Oral TID Marilyn Molina M.D.   30 mL at  05/01/21 0508   • doxycycline monohydrate (ADOXA) tablet 100 mg  100 mg Enteral Tube Q12HRS Marilyn Molina M.D.   100 mg at 05/01/21 0508   • linezolid (ZYVOX) tablet 600 mg  600 mg Enteral Tube Q12HRS Marilyn Molina M.D.   600 mg at 05/01/21 0509   • norepinephrine (Levophed) infusion 32 mg/500 mL (premix)  0-30 mcg/min Intravenous Continuous Marilyn Molina M.D. 20.6 mL/hr at 05/01/21 0723 22 mcg/min at 05/01/21 0723   • insulin regular (HumuLIN R,NovoLIN R) injection  2-9 Units Subcutaneous Q6HRS Marilyn Molina M.D.   Stopped at 05/01/21 0000    And   • glucose 4 g chewable tablet 16 g  16 g Oral Q15 MIN PRN Marilyn Molina M.D.        And   • dextrose 50% (D50W) injection 50 mL  50 mL Intravenous Q15 MIN PRN Marilyn Molina M.D.       • micafungin (MYCAMINE) 100 mg in dextrose 5% 100 mL ivpb  100 mg Intravenous Q24HRS Marilyn Molina M.D.   Stopped at 05/01/21 0618   • vasopressin (VASOSTRICT) 20 Units in  mL Infusion  0.03 Units/min Intravenous Continuous Terrance Pierce Jr. D.O. 9 mL/hr at 05/01/21 0545 0.03 Units/min at 05/01/21 0545   • famotidine (PEPCID) tablet 20 mg  20 mg Enteral Tube DAILY Marilyn Molina M.D.   20 mg at 05/01/21 0508    Or   • famotidine (PEPCID) injection 20 mg  20 mg Intravenous DAILY Marilyn Molina M.D.       • propofol (DIPRIVAN) injection  0-60 mcg/kg/min Intravenous Continuous Marilyn Molina M.D. 47.9 mL/hr at 05/01/21 0645 70 mcg/kg/min at 05/01/21 0645   • piperacillin-tazobactam (ZOSYN) 4.5 g in  mL IVPB  4.5 g Intravenous Q8HRS Marilyn Molina M.D. 25 mL/hr at 05/01/21 0434 4.5 g at 05/01/21 0434   • OLANZapine (ZYPREXA) tablet 5 mg  5 mg Enteral Tube Q EVENING Marilyn Molina M.D.   5 mg at 04/30/21 1714   • acetaminophen (Tylenol) tablet 650 mg  650 mg Enteral Tube Q4HRS PRN Marilyn Molina M.D.   650 mg at 04/28/21 0216   • Respiratory Therapy Consult   Nebulization Continuous RT Terrance Pierce Jr., D.O.       • MD Alert...ICU Electrolyte Replacement per  Pharmacy   Other PHARMACY TO DOSE SARIAH Basurto Jr..O.       • lidocaine (XYLOCAINE) 1 % injection 1-2 mL  1-2 mL Tracheal Tube Q30 MIN PRN SARIAH Basurto Jr..O.       • fentaNYL (SUBLIMAZE) injection 100 mcg  100 mcg Intravenous Q15 MIN PRN SARIAH Basurto Jr..O.   100 mcg at 05/01/21 0629    And   • fentaNYL (SUBLIMAZE) injection 200 mcg  200 mcg Intravenous Q15 MIN PRN SARIAH Basurto Jr..O.   100 mcg at 04/27/21 1800    And   • fentaNYL (SUBLIMAZE) 50 mcg/mL in 50mL (Continuous Infusion)   Intravenous Continuous SARIAH Basurto Jr..O. 4 mL/hr at 05/01/21 0722 200 mcg/hr at 05/01/21 0722   • haloperidol lactate (HALDOL) injection 2-5 mg  2-5 mg Intravenous Q4HRS PRN Eleuterio Treviño M.D.   5 mg at 04/25/21 2004   • potassium bicarbonate (KLYTE) effervescent tablet 50 mEq  50 mEq Enteral Tube DAILY Miky Gama D.O.   50 mEq at 05/01/21 0509   • albuterol (PROVENTIL) 2.5mg/0.5ml nebulizer solution 2.5 mg  2.5 mg Nebulization Q4H PRN (RT) Evin Hammer M.D.   2.5 mg at 04/20/21 0758   • Pharmacy Consult: Enteral tube insertion - review meds/change route/product selection  1 Each Other PHARMACY TO DOSE Jeremy M Gonda, M.D.       • thiamine (Vitamin B-1) tablet 100 mg  100 mg Enteral Tube DAILY Radha Beckett M.D.   100 mg at 05/01/21 0508   • folic acid (FOLVITE) tablet 1 mg  1 mg Enteral Tube DAILY Radha Beckett M.D.   1 mg at 05/01/21 0508       Fluids    Intake/Output Summary (Last 24 hours) at 5/1/2021 0800  Last data filed at 5/1/2021 0600  Gross per 24 hour   Intake 1517.4 ml   Output 115 ml   Net 1402.4 ml       Laboratory  Recent Labs     04/30/21  0322 04/30/21  0349 05/01/21  0349   ISTATAPH 7.292* 7.368* 7.337*   ISTATAPCO2 40.1* 31.3 32.4   ISTATAPO2 50* 78 79   ISTATATCO2 21 19* 18*   [id number] 81* 95 95   ISTATARTHCO3 19.4 18.0 17.4   ISTATARTBE -7* -6* -8*   ISTATTEMP 36.6 C 37.4 C 36.8 C   ISTATFIO2 100 40 75   ISTATSPEC Arterial Arterial Arterial    ISTATAPHTC 7.298* 7.362* 7.340*   [id number] 49* 80 78         Recent Labs     04/29/21 0048 04/29/21 0558 04/30/21 0553 04/30/21  1120 04/30/21  2311   SODIUM 144   < > 137 138 137   POTASSIUM 5.3   < > 5.0 5.4 5.4   CHLORIDE 104   < > 101 100 99   CO2 22   < > 18* 19* 18*   BUN 66*   < > 73* 71* 82*   CREATININE 3.78*   < > 5.62* 5.88* 6.57*   MAGNESIUM 2.6*  --   --   --   --    PHOSPHORUS 5.7*  --   --   --   --    CALCIUM 7.1*   < > 7.5* 7.6* 7.9*    < > = values in this interval not displayed.     Recent Labs     04/29/21 0048 04/29/21 0558 04/29/21 2330 04/29/21 2330 04/30/21 0553 04/30/21  1120 04/30/21  2311   ALTSGPT 44  --  40  --   --   --   --    ASTSGOT 102*  --  104*  --   --   --   --    ALKPHOSPHAT 72  --  78  --   --   --   --    TBILIRUBIN 14.4*  --  17.6*  --   --   --   --    GLUCOSE 203*   < > 161*   < > 157* 172* 150*    < > = values in this interval not displayed.     Recent Labs     04/29/21 0048 04/29/21 0848 04/29/21 2330 04/29/21 2330 04/30/21  0553 04/30/21  2205 05/01/21  0430   WBC 28.7*   < > 28.0*   < > 28.7* 31.4* 31.2*   NEUTSPOLYS 72.30*  --   --   --  77.20*  --  77.50*   LYMPHOCYTES 9.30*  --   --   --  6.10*  --  8.30*   MONOCYTES 4.20  --   --   --  3.50  --  6.70   EOSINOPHILS 0.80  --   --   --  0.00  --  0.90   BASOPHILS 0.00  --   --   --  0.90  --  0.00   ASTSGOT 102*  --  104*  --   --   --   --    ALTSGPT 44  --  40  --   --   --   --    ALKPHOSPHAT 72  --  78  --   --   --   --    TBILIRUBIN 14.4*  --  17.6*  --   --   --   --     < > = values in this interval not displayed.     Recent Labs     04/30/21  0553 04/30/21  2205 05/01/21  0430   RBC 2.79* 2.88* 2.84*   HEMOGLOBIN 9.3* 9.5* 9.6*   HEMATOCRIT 28.5* 30.1* 29.3*   PLATELETCT 312 306 292       Imaging  X-Ray:  I have personally reviewed the images and compared with prior images.    Assessment/Plan  * Septic shock (HCC)- (present on admission)  Assessment & Plan  Unclear source  Improving fever  curse, ongoing leukocytosis  Vasopressin/levoph for MAP >65       -Continue zosyn, zyvox, micafungin, doxy  -Unclear source.  Blood, urine, sputum, C. Diff negative. No wounds. Changed central line 4/28. Non-con CT abdomen no source.   -Had some purulent drainage c/w sinusitis per ENT, now drained.  He was on zosyn which should have covered sinusitis, so it's less likely that was the only culprit source  -Empiric addition of doxy and micafungin due to severe critical illness and unknown source of infection    Acute renal failure (ARF) (HCC)  Assessment & Plan  Family decided against HD given his very poor prognosis even with HD  Cont q12 BMP for prognostic value for family    Epistaxis  Assessment & Plan  Bleeding finally resolved after ENT intervention and 4U FFP  Hgb q12  Bleeding again, TEG guided therapy      Acute encephalopathy  Assessment & Plan  Was initially d/t EtOH withdrawal  Now likely metabolic d/t liver disease, toxic d/t benzos and steroids and ICU delirium  Hold lactulose for diarrhea and hypernatremia  olanzapine for agitated delirium  Sedation with propofol/fent  Delirium precautions    Goals of care, counseling/discussion  Assessment & Plan  4/19 discussed with Bryon, brother and sister in law on the phone and updated pt's critical condition. Mother is coming tonight. All questions were answered  4/20 mother was at bedside and I updated her on patient's condition. All questions were answered  4/22 mother was at bedside and I updated her on patient's condition. All questions were answered  4/23 had a long discussion with brother and mother to update about patient's critical condition.  4/28 discussed with mom again about how much worse he is getting.  Recommended DNR as I do not think CPR would be beneficial in this situation (patient is not a transplant candidate, and this is all a result of his liver disease and alcohol). Mom is in agreement but wants to discuss with patient's brothers  4/29:  Family would like patient to remain full code.  4/30: Long family conversation with palliative and ICU. Family elected to not pursue HD because of his very poor prognosis even with HD. DNR but continue current level of support so family can come say goodbye.     Acute respiratory failure with hypoxia (HCC)- (present on admission)  Assessment & Plan  Intubated at outside hospital for aspiration and hypoxia  4/18 s/p bronch due to increased secretions.   4/20 Extubated   reintubated 4/26 for thick, bloody secretions, hypoxia and failure to manage airway  Hypoxia likely d/t aspiration pneumonitis vs PNA vs pneumonitis from epistaxis    Lung protective ventilation  All appropriate vent bundles      Alcoholic hepatitis- (present on admission)  Assessment & Plan  D/c prednisolone as the data supporting use in acute alcoholic hepatitis is not robust, and I feel the risks of steroids (delirium, weakness, infections) outweigh the benefits at this time      Hypernatremia- (present on admission)  Assessment & Plan  Improving  Trend q12      Aspiration pneumonitis (HCC)  Assessment & Plan  Completed 5d augmentin      Pancreatitis- (present on admission)  Assessment & Plan  Questionable ?lipase at the outside hospital was 800, however CT abdomen showed no signs of acute pancreatitis   Possibly triggered by alcohol   Repeat lipase here was 412  Monitor    Abdominal distension  Assessment & Plan  No ascites on POCUS    Alcoholic cirrhosis (HCC)  Assessment & Plan  MELD score is 29, indicating 19.6% 3 month mortality  Not a current transplant candidate due to active EtOH abuse    Hemoptysis  Assessment & Plan  Pt was intubated for failure to manage these secretions  No significant blood on bronch  Source of this is was epistaxis    Coagulopathy (HCC)  Assessment & Plan  Due to liver disease  Replete vitamin K    Thrombocytosis (HCC)  Assessment & Plan  Trend    Anemia  Assessment & Plan  Macrocytic, probably d/t liver disease and  EtOH  Required transfusions for acute blood loss anemia d/t epistaxis    Hypophonia with hoarseness  Assessment & Plan  Re-evaluate once extubated    Hypophosphatemia- (present on admission)  Assessment & Plan  Severe  Continue to agressively replete    Alcohol withdrawal (HCC)- (present on admission)  Assessment & Plan  Out of withdrawal window  Cont vitamins    Alcohol abuse- (present on admission)  Assessment & Plan  Since the age of 16 years as per the documentation  Pt is actively drinking. 12packs of beer daily and a pint of hard liquor daily.    Last drink was before admission          VTE:  Contraindicated  Ulcer: H2 Antagonist  Lines: Central Line  Ongoing indication addressed and Sotomayor Catheter  Ongoing indication addressed    I have performed a physical exam and reviewed and updated ROS and Plan today (5/1/2021). In review of yesterday's note (4/30/2021), there are no changes except as documented above.     Discussed patient condition and risk of morbidity and/or mortality with Family, RN, RT, Pharmacy, Charge nurse / hot rounds, Patient and infectious disease and nephrology     The patient remains critically ill on ventilator, vasopressors infusions.  Critical care time = 35 minutes in directly providing and coordinating critical care and extensive data review.  No time overlap and excludes procedures.

## 2021-05-01 NOTE — PROGRESS NOTES
Spiritual Care Note    Patient Information     Patient's Name: Irving Ivory   MRN: [id number]    YOB: 1980   Age and Gender: 40 y.o. male   Service Area: CARDIAC ICU [facility]   Room (and Bed): T6/   Ethnicity or Nationality:     Primary Language: English   Mandaeism/Spiritual preference: Jainism   Place of Residence: [place]   Family/Friends/Others Present: Mother   Clinical Team Present: RN   Medical Diagnosis(-es)/Procedure(s): Alcohol withdrawal delirium   Code Status: DNR    Date of Admission: 4/17/2021   Length of Stay: 13 days        Spiritual Care Provider Information:  Name of Spiritual Care Provider: Sheila Wilkes  Title of Spiritual Care Provider: Associate   Phone Number: [phone]  E-mail: [email or web address]  Total time : 30 minutes    Spiritual Screen Results:    Palliative Care  PC Mandaeism/Spiritual Screening  Is your spiritual health or inner well-being important to you as you cope with your medical condition?: Yes  Would you like to receive a visit from our Spiritual Care team or your own Evangelical or spiritual leader?: Yes      Encounter/Request Information  Encounter/Request Type     Visited With: Patient and family together    Nature of the Visit: Follow-up, On shift    Crisis Visit: Critical care    Referral From/ Origin of Request: Epic nursing    Religous Needs/Values  Mandaeism Needs Visit    Mandaeism Needs: Prayer, Scripture    Ritual Needs Visit    Ritual Needs: [place]    Spiritual Assessment   Spiritual Care Encounters    Observations/Symptoms: (Pt intub, unresp; Mother BS)    Interacton/Conversation:  checked in with BS RN then introduced self to mother of pt.  Mother was teary, shared family stories of pt's juan and difficulty battling depression.  Mother spoke lovingly of pt and declared his juan strong, though pt admitted he wanted to be a stronger follower.  Mother desired Scripture to be shared with pt  - pt had tatoos of various Scripture passages -  prayed one of those with mother and pt, and offered blessing for pt.     Assessment: (Mother in need and distress)    Intended Effects: Helping Someone Feel Comforted, Génesis Affirmation    Interventions: For mother; companionship, Reflective Listening, prayer; for pt, prayer/scripture    Outcomes: Other (Comment)(Mother grateful, comforted)    Plan: Continue with Family    Notes:

## 2021-05-01 NOTE — FLOWSHEET NOTE
Ventilator Daily Summary    Vent Day # 6  8 @ 26    APVCMV: 30/420/+14/70%    Plan: Continue current ventilator settings and wean mechanical ventilation as tolerated per physician orders.

## 2021-05-01 NOTE — CARE PLAN
Problem: Communication  Goal: The ability to communicate needs accurately and effectively will improve  Outcome: PROGRESSING SLOWER THAN EXPECTED     Problem: Safety  Goal: Will remain free from falls  Outcome: PROGRESSING AS EXPECTED     Problem: Infection  Goal: Will remain free from infection  Outcome: PROGRESSING AS EXPECTED     Problem: Safety - Medical Restraint  Goal: Remains free of injury from restraints (Restraint for Interference with Medical Device)  Description: INTERVENTIONS:  1. Determine that other, less restrictive measures have been tried or would not be effective before applying the restraint  2. Evaluate the patient's condition at the time of restraint application  3. Inform patient/family regarding the reason for restraint  4. Q2H: Monitor safety, psychosocial status, comfort, nutrition and hydration  Outcome: PROGRESSING SLOWER THAN EXPECTED  Goal: Free from restraint(s) (Restraint for Interference with Medical Device)  Description: INTERVENTIONS:  1. ONCE/SHIFT or MINIMUM Q12H: Assess and document the continuing need for restraints  2. Q24H: Continued use of restraint requires LIP to perform face to face examination and written order  3. Identify and implement measures to help patient regain control  Outcome: PROGRESSING SLOWER THAN EXPECTED

## 2021-05-01 NOTE — CARE PLAN
Problem: Fluid Volume:  Goal: Will maintain balanced intake and output  Outcome: PROGRESSING SLOWER THAN EXPECTED     Problem: Urinary Elimination:  Goal: Ability to reestablish a normal urinary elimination pattern will improve  Outcome: PROGRESSING SLOWER THAN EXPECTED

## 2021-05-02 NOTE — PROGRESS NOTES
Spiritual Care Note    Patient Information     Patient's Name: Irving Ivory   MRN: [id number]    YOB: 1980   Age and Gender: 40 y.o. male   Service Area: CARDIAC ICU [facility]   [facility]   Ethnicity or Nationality:     Primary Language: English   Holiness/Spiritual preference: Protestant   Place of Residence: [place]   Family/Friends/Others Present: Mother, 2 Brothers   Clinical Team Present: RN, RT   Medical Diagnosis(-es)/Procedure(s): Alcohol Withdrawal delirium   Code Status: Comfort Care/DNR    Date of Admission: 4/17/2021   Length of Stay: 15 days        Spiritual Care Provider Information:  Name of Spiritual Care Provider: Sheila Wilkes  Title of Spiritual Care Provider: Associate Reddy  Phone Number: [phone]  E-mail: [email or web address]  Total time : 45 minutes    Spiritual Screen Results:    Palliative Care  PC Holiness/Spiritual Screening  Is your spiritual health or inner well-being important to you as you cope with your medical condition?: Yes  Would you like to receive a visit from our Spiritual Care team or your own Catholic or spiritual leader?: Yes      Encounter/Request Information  Encounter/Request Type     Visited With: Patient and family together, Health care provider    Nature of the Visit: Follow-up, Call back    Continue Visiting: No    Crisis Visit: Patient actively dying/EOL, Death    Referral From/ Origin of Request: Epic nursing, Verbal staff    Religous Needs/Values  Holiness Needs Visit    Holiness Needs: Prayer, Scripture    Ritual Needs Visit    Ritual Needs: EOL ritual    Spiritual Assessment   Spiritual Care Encounters    Observations/Symptoms: Other (Comment)(Pt actively passing, unresp; mother, 2 bros BS)    Interacton/Conversation:  responded to callback from RN re: family wanting  presence for terminal extubation.  Upon 's arrival, Mother and 2 brothers were BS, care team  preparing for extubation.  Middle bro offered prayer for pt, as well as  offered spiritual care for EOL prayer/anointing for pt; family participated in prayer and anointing.  Family grateful for 's presence.    Assessment: Need, Distress    Need: Seeking Spiritual Assistance and Support    Distress: (Family grief/loss/bereavement)    Intended Effects: Génesis Affirmation, Journeying With Someone in Grief Process    Interventions: Therapeutic Touch, Conversation, Prayer/EOL Ritual    Outcomes: Other (Comment)(Family comforted/grateful)    Plan: No Further Visits    Notes:

## 2021-05-02 NOTE — CARE PLAN
Problem: Safety  Goal: Will remain free from injury  Outcome: PROGRESSING AS EXPECTED  Goal: Will remain free from falls  Outcome: PROGRESSING AS EXPECTED     Problem: Safety - Medical Restraint  Goal: Remains free of injury from restraints (Restraint for Interference with Medical Device)  Description: INTERVENTIONS:  1. Determine that other, less restrictive measures have been tried or would not be effective before applying the restraint  2. Evaluate the patient's condition at the time of restraint application  3. Inform patient/family regarding the reason for restraint  4. Q2H: Monitor safety, psychosocial status, comfort, nutrition and hydration  Outcome: PROGRESSING AS EXPECTED  Goal: Free from restraint(s) (Restraint for Interference with Medical Device)  Description: INTERVENTIONS:  1. ONCE/SHIFT or MINIMUM Q12H: Assess and document the continuing need for restraints  2. Q24H: Continued use of restraint requires LIP to perform face to face examination and written order  3. Identify and implement measures to help patient regain control  Outcome: PROGRESSING AS EXPECTED     Problem: Pain Management  Goal: Pain level will decrease to patient's comfort goal  Outcome: PROGRESSING AS EXPECTED     Problem: Communication  Goal: The ability to communicate needs accurately and effectively will improve  Outcome: PROGRESSING SLOWER THAN EXPECTED     Problem: Infection  Goal: Will remain free from infection  Outcome: PROGRESSING SLOWER THAN EXPECTED     Problem: Respiratory:  Goal: Respiratory status will improve  Outcome: PROGRESSING SLOWER THAN EXPECTED

## 2021-05-02 NOTE — PROGRESS NOTES
Pt with slight neuro change this AM. Gradually harder to illicit response from him overnight. Response becoming weaker. LUE W/D to pain. All other extremities flaccid. No gag. Intact cough and corneals. Pupils equal and reactive.    Updated Dr. Gonda that day team increased sedation due to dyssynchrony with vent. Still maxed on propofol @ 70 mcg and on 250mcg of Fentanyl.     Updated Dr. Gonda on family goals of care. Continuing to monitor pt neuro status while maxed on sedation.

## 2021-05-02 NOTE — CARE PLAN
Problem: Communication  Goal: The ability to communicate needs accurately and effectively will improve  Outcome: PROGRESSING SLOWER THAN EXPECTED     Problem: Safety - Medical Restraint  Goal: Remains free of injury from restraints (Restraint for Interference with Medical Device)  Description: INTERVENTIONS:  1. Determine that other, less restrictive measures have been tried or would not be effective before applying the restraint  2. Evaluate the patient's condition at the time of restraint application  3. Inform patient/family regarding the reason for restraint  4. Q2H: Monitor safety, psychosocial status, comfort, nutrition and hydration  Outcome: PROGRESSING AS EXPECTED  Goal: Free from restraint(s) (Restraint for Interference with Medical Device)  Description: INTERVENTIONS:  1. ONCE/SHIFT or MINIMUM Q12H: Assess and document the continuing need for restraints  2. Q24H: Continued use of restraint requires LIP to perform face to face examination and written order  3. Identify and implement measures to help patient regain control  Outcome: PROGRESSING AS EXPECTED

## 2021-05-02 NOTE — DIETARY
Nutrition Services: Brief Update    RD previously following pt for tube feeding (advancement to goal rate).  Per chart review, pt has transitioned to comfort care.    Consult RD as needed. RD available PRN.

## 2021-05-02 NOTE — DISCHARGE SUMMARY
Death Summary    Cause of Death  Multiorgan failure due to septic shock due to unknown source of infection due to alcoholic cirrhosis.    Contributing: alcohol dependance    Comorbid Conditions at the Time of Death  Principal Problem:    Septic shock (HCC) POA: Yes  Active Problems:    Acute respiratory failure with hypoxia (HCC) POA: Yes    Goals of care, counseling/discussion POA: Unknown    Acute encephalopathy POA: Unknown    Epistaxis POA: Unknown    Acute renal failure (ARF) (HCC) POA: Unknown    Pancreatitis POA: Yes    Aspiration pneumonitis (HCC) POA: Unknown    Hypernatremia POA: Yes    Alcoholic hepatitis POA: Yes    Alcohol withdrawal (HCC) POA: Yes    Hypophosphatemia POA: Yes    Hypophonia with hoarseness POA: Unknown    Anemia POA: Unknown    Thrombocytosis (HCC) POA: Unknown    Coagulopathy (HCC) POA: Unknown    Hemoptysis POA: Unknown    Alcoholic cirrhosis (HCC) POA: Unknown    Abdominal distension POA: Unknown    Alcohol abuse POA: Yes  Resolved Problems:    Folate deficiency POA: Yes    Elevated bilirubin POA: Yes    Fever POA: Unknown      History of Presenting Illness and Hospital Course  This is a 40 y.o. male admitted 4/17/2021 with history of alcohol dependence who was admitted to our hospital with severe alcohol withdrawal.  He required mechanical ventilation to get through his withdrawal. He was successfully extubated, but required ongoing precedex infusion for agitated delirium and levophed for ongoing shock.  He developed fever and then progressive septic shock.  Extensive imaging and testing was performed without a clear source of infection.  He also developed refractory epistaxis that resulted in a second episode of mechanical ventilation. Despite aggressive support with broad antibiotics, mechanical ventilation, pressors and a search for source of infection, the patient progressed to multiorgan failure.  I discussed his grave prognosis with his family and they elected against dialysis  so they would not prolong his suffering.  Unfortunately, due to his alcohol abuse, Irving was not a liver transplant candidate. On  patient's family elected comfort measures only.  After extubation and turning off pressors, Irving  quickly with family at bedside.     Death Date: 21   Death Time: 1422         Pronounced By (RN1): Gwendolyn Infante RN  Pronounced By (RN2): Evin Lechuga RN

## 2021-05-02 NOTE — PROGRESS NOTES
Critical WBC of 40.7 this AM, was 31.2 yesterday morning. Reported to Dr. Gonda @ 9596. Pt currently on zyvox, micafungin, and doxycycline.

## 2021-05-02 NOTE — PROGRESS NOTES
Lab called with critical result of K 6.6 at 1210. Critical lab result read back to lab.   Dr. Molina notified of critical lab result at 1211.  Critical lab result read back by Dr. Molina.    No orders at this time.

## 2021-05-02 NOTE — PROGRESS NOTES
Critical Care Progress Note    Date of admission  4/17/2021    Chief Complaint  Respiratory failure, encephalopathy    Hospital Course  40-year-old male with BMI 39 transferred from [facility] where he was admitted several days ago prior to admission for pancreatitis and alcohol abuse subsequently going into alcohol withdrawal syndrome.  He required multiple doses of Ativan and had a CIWA score of 25, worsening AMS, intubated and transferred here for further management. Post intubation, pt became hypotensive, started on NE gtt. Lipase 800, CT A/P with no evidence of complications of pancreatitis.     4/17 intubated at OSH  4/18 s/p bronched due to increased secretions.   4/19 extubated    4/26: low dose pressors, hemoptysis. Reintubated at night for failure to clear thick bloody secretions and hypoxia.   4/27: bronch no source of bleed in airways, VD2 8/50%, spiking fevers, switch precedex to propofol, C. Diff negative.   4/28: epistaxis ongoing, ENT consult. Worsening shock. 12/100%. Worsening JEYSON. Still febrile. ID consult. Palliative consulted.   4/29: Increasing pressors needs. Anuric. Still epistaxis overnight, transfusing FFP, PRBC. 14/100%. Renal consult. Family wishes full code.   4/30: 14/75%. Vaso and norepi 20. Anuric with rising K and dropping bicarb. Epistaxis finally stopped. Family conversation, they elected to not do HD but will continue full support otherwise while they travel here.  DNR.       Interval Problem Update  Reviewed last 24 hour events:  Neuro: only corneal left  HR: SR 90s  SBP: MAP 65 on levo 26, vaso. +4 edema  Tmax: AF  Bloody oral secretions have slowed down  GI: peptamen at 10, BMS in place. D/c tube feeds  I/O: anuric  Lines: L femoral TLC  Mobility: contraindicated  Resp: VD 7, 14/80%   Vte: contraindicated  PPI/H2:pepcid  Antibx: zosyn, micafungin, doxy, linezolid.    Probably moving to comfort in the afternoon.  Will continue propofol for vent synchrony even  though triglycerides are high  HyperK, family has decided against HD      Review of Systems  Review of Systems   Unable to perform ROS: Intubated        Vital Signs for last 24 hours   Temp:  [36.8 °C (98.2 °F)-36.9 °C (98.4 °F)] 36.8 °C (98.2 °F)  Pulse:  [82-96] 94  Resp:  [18-36] 27  BP: ()/(44-57) 110/46  SpO2:  [91 %-97 %] 93 %    Hemodynamic parameters for last 24 hours       Respiratory Information for the last 24 hours  Vent Mode: APVCMV  Rate (breaths/min): 30  Vt Target (mL): 420  PEEP/CPAP: 14  MAP: 23  Control VTE (exp VT): 461    Physical Exam   Physical Exam  Constitutional:       Appearance: He is ill-appearing.      Interventions: He is sedated and intubated.   HENT:      Mouth/Throat:      Mouth: Mucous membranes are dry.      Comments: Blood in oropharyn  Eyes:      General: Scleral icterus present.      Pupils: Pupils are equal, round, and reactive to light.   Cardiovascular:      Rate and Rhythm: Regular rhythm. Tachycardia present.      Pulses: Normal pulses.   Pulmonary:      Effort: No respiratory distress. He is intubated.      Breath sounds: Rhonchi present.   Abdominal:      General: There is distension.      Tenderness: There is no abdominal tenderness.      Comments: Absent bowel sounds   Musculoskeletal:      Right lower leg: Edema present.      Left lower leg: Edema present.      Comments: Diffuse anasarca   Skin:     Coloration: Skin is jaundiced.   Neurological:      Comments: Sedated on my exam.  Per RN no longer following commands off sedation.     Psychiatric:      Comments: Unable to assess         Medications  Current Facility-Administered Medications   Medication Dose Route Frequency Provider Last Rate Last Admin   • piperacillin-tazobactam (ZOSYN) 4.5 g in  mL IVPB  4.5 g Intravenous Q12HRS Marilyn Molina M.D.       • doxycycline monohydrate (ADOXA) tablet 100 mg  100 mg Enteral Tube Q12HRS Marilyn Molina M.D.   100 mg at 05/02/21 0546   • linezolid (ZYVOX) tablet  600 mg  600 mg Enteral Tube Q12HRS Marilyn Molina M.D.   600 mg at 05/02/21 0547   • norepinephrine (Levophed) infusion 32 mg/500 mL (premix)  0-70 mcg/min Intravenous Continuous Marilyn Molina M.D. 24.4 mL/hr at 05/01/21 1700 26 mcg/min at 05/01/21 1700   • insulin regular (HumuLIN R,NovoLIN R) injection  2-9 Units Subcutaneous Q6HRS Marilyn Molina M.D.   Stopped at 05/01/21 1800    And   • glucose 4 g chewable tablet 16 g  16 g Oral Q15 MIN PRN Marilyn Molina M.D.        And   • dextrose 50% (D50W) injection 50 mL  50 mL Intravenous Q15 MIN PRN Marilyn Molina M.D.       • micafungin (MYCAMINE) 100 mg in dextrose 5% 100 mL ivpb  100 mg Intravenous Q24HRS Marilyn Molina M.D.   Stopped at 05/02/21 0655   • vasopressin (VASOSTRICT) 20 Units in  mL Infusion  0.03 Units/min Intravenous Continuous Terrance Pierce Jr., D.O. 9 mL/hr at 05/02/21 0447 0.03 Units/min at 05/02/21 0447   • famotidine (PEPCID) tablet 20 mg  20 mg Enteral Tube DAILY Marilyn Molina M.D.   20 mg at 05/02/21 0547    Or   • famotidine (PEPCID) injection 20 mg  20 mg Intravenous DAILY Marilyn Molina M.D.       • propofol (DIPRIVAN) injection  0-60 mcg/kg/min Intravenous Continuous Marilyn Molina M.D. 47.9 mL/hr at 05/02/21 0946 70 mcg/kg/min at 05/02/21 0946   • OLANZapine (ZYPREXA) tablet 5 mg  5 mg Enteral Tube Q EVENING Marilyn Molina M.D.   5 mg at 05/01/21 1712   • acetaminophen (Tylenol) tablet 650 mg  650 mg Enteral Tube Q4HRS PRN Marilyn Molina M.D.   650 mg at 04/28/21 0216   • Respiratory Therapy Consult   Nebulization Continuous RT Terrance Pierce Jr., D.O.       • lidocaine (XYLOCAINE) 1 % injection 1-2 mL  1-2 mL Tracheal Tube Q30 MIN PRN SARIAH Basurto Jr..O.       • fentaNYL (SUBLIMAZE) injection 100 mcg  100 mcg Intravenous Q15 MIN PRN SARIAH Bsaurto Jr..O.   100 mcg at 05/02/21 0904    And   • fentaNYL (SUBLIMAZE) injection 200 mcg  200 mcg Intravenous Q15 MIN PRN SARIAH Basurto Jr..O.   100 mcg at  04/27/21 1800    And   • fentaNYL (SUBLIMAZE) 50 mcg/mL in 50mL (Continuous Infusion)   Intravenous Continuous Terrance Pierce Jr., D.O. 5 mL/hr at 05/01/21 2212 2,500 mcg at 05/02/21 0909   • haloperidol lactate (HALDOL) injection 2-5 mg  2-5 mg Intravenous Q4HRS PRN Eleuterio Treviño M.D.   5 mg at 04/25/21 2004   • albuterol (PROVENTIL) 2.5mg/0.5ml nebulizer solution 2.5 mg  2.5 mg Nebulization Q4H PRN (RT) Evin Hammer M.D.   2.5 mg at 04/20/21 0758   • Pharmacy Consult: Enteral tube insertion - review meds/change route/product selection  1 Each Other PHARMACY TO DOSE Jeremy M Gonda, M.D.       • thiamine (Vitamin B-1) tablet 100 mg  100 mg Enteral Tube DAILY Radha Beckett M.D.   100 mg at 05/02/21 0547   • folic acid (FOLVITE) tablet 1 mg  1 mg Enteral Tube DAILY Radha Beckett M.D.   1 mg at 05/02/21 0547       Fluids    Intake/Output Summary (Last 24 hours) at 5/2/2021 1012  Last data filed at 5/2/2021 1000  Gross per 24 hour   Intake 3038.57 ml   Output 40 ml   Net 2998.57 ml       Laboratory  Recent Labs     04/30/21  0322 04/30/21  0349 05/01/21  0349   ISTATAPH 7.292* 7.368* 7.337*   ISTATAPCO2 40.1* 31.3 32.4   ISTATAPO2 50* 78 79   ISTATATCO2 21 19* 18*   [id number] 81* 95 95   ISTATARTHCO3 19.4 18.0 17.4   ISTATARTBE -7* -6* -8*   ISTATTEMP 36.6 C 37.4 C 36.8 C   ISTATFIO2 100 40 75   ISTATSPEC Arterial Arterial Arterial   ISTATAPHTC 7.298* 7.362* 7.340*   [id number] 49* 80 78         Recent Labs     05/01/21  0900 05/01/21  1040 05/02/21  0005   SODIUM 137 142 139   POTASSIUM 6.1* 6.3* 6.3*   CHLORIDE 99 101 98   CO2 18* 18* 15*   BUN 85* 85* 92*   CREATININE 7.04* 6.95* 7.08*   CALCIUM 8.0* 7.9* 7.9*     Recent Labs     04/29/21  2330 04/30/21  0553 05/01/21  0900 05/01/21  1040 05/02/21  0005   ALTSGPT 40  --  40  --   --    ASTSGOT 104*  --  167*  --   --    ALKPHOSPHAT 78  --  96  --   --    TBILIRUBIN 17.6*  --  19.7*  --   --    GLUCOSE 161*   < > 162* 159* 154*    < >  = values in this interval not displayed.     Recent Labs     04/29/21  2330 04/29/21  2330 04/30/21  0553 04/30/21  0553 04/30/21 2205 05/01/21 0430 05/01/21  0900 05/02/21 0430   WBC 28.0*   < > 28.7*   < > 31.4* 31.2*  --  40.7*   NEUTSPOLYS  --   --  77.20*  --   --  77.50*  --  78.40*   LYMPHOCYTES  --   --  6.10*  --   --  8.30*  --  3.40*   MONOCYTES  --   --  3.50  --   --  6.70  --  2.60   EOSINOPHILS  --   --  0.00  --   --  0.90  --  0.90   BASOPHILS  --   --  0.90  --   --  0.00  --  3.50*   ASTSGOT 104*  --   --   --   --   --  167*  --    ALTSGPT 40  --   --   --   --   --  40  --    ALKPHOSPHAT 78  --   --   --   --   --  96  --    TBILIRUBIN 17.6*  --   --   --   --   --  19.7*  --     < > = values in this interval not displayed.     Recent Labs     04/30/21 2205 05/01/21 0430 05/02/21 0430   RBC 2.88* 2.84* 2.86*   HEMOGLOBIN 9.5* 9.6* 9.5*   HEMATOCRIT 30.1* 29.3* 30.0*   PLATELETCT 306 292 297       Imaging  X-Ray:  I have personally reviewed the images and compared with prior images.    Assessment/Plan  * Septic shock (HCC)- (present on admission)  Assessment & Plan  Unclear source  Improving fever curse, ongoing leukocytosis  Vasopressin/levoph for MAP >65       -Continue zosyn, zyvox, micafungin, doxy  -Unclear source.  Blood, urine, sputum, C. Diff negative. No wounds. Changed central line 4/28. Non-con CT abdomen no source.   -Had some purulent drainage c/w sinusitis per ENT, now drained.  He was on zosyn which should have covered sinusitis, so it's less likely that was the only culprit source  -Empiric addition of doxy and micafungin due to severe critical illness and unknown source of infection    Acute renal failure (ARF) (HCC)  Assessment & Plan  Family decided against HD given his very poor prognosis even with HD  Cont q12 BMP for prognostic value for family    Epistaxis  Assessment & Plan  Bleeding finally resolved after ENT intervention and 4U FFP  Hgb q12  Bleeding again, TEG  guided therapy      Acute encephalopathy  Assessment & Plan  Was initially d/t EtOH withdrawal  Now likely metabolic d/t liver disease, toxic d/t benzos and steroids and ICU delirium  Hold lactulose for diarrhea and hypernatremia  olanzapine for agitated delirium  Sedation with propofol/fent  Delirium precautions    Goals of care, counseling/discussion  Assessment & Plan  4/19 discussed with Bryon, brother and sister in law on the phone and updated pt's critical condition. Mother is coming tonight. All questions were answered  4/20 mother was at bedside and I updated her on patient's condition. All questions were answered  4/22 mother was at bedside and I updated her on patient's condition. All questions were answered  4/23 had a long discussion with brother and mother to update about patient's critical condition.  4/28 discussed with mom again about how much worse he is getting.  Recommended DNR as I do not think CPR would be beneficial in this situation (patient is not a transplant candidate, and this is all a result of his liver disease and alcohol). Mom is in agreement but wants to discuss with patient's brothers  4/29: Family would like patient to remain full code.  4/30: Long family conversation with palliative and ICU. Family elected to not pursue HD because of his very poor prognosis even with HD. DNR but continue current level of support so family can come say goodbye.   5/2: Family plans to come in this afternoon to discuss comfort    Acute respiratory failure with hypoxia (HCC)- (present on admission)  Assessment & Plan  Intubated at outside hospital for aspiration and hypoxia  4/18 s/p bronch due to increased secretions.   4/20 Extubated   reintubated 4/26 for thick, bloody secretions, hypoxia and failure to manage airway  Hypoxia likely d/t aspiration pneumonitis vs PNA vs pneumonitis from epistaxis    Lung protective ventilation  All appropriate vent bundles  Worsening hypoxia probably due to  overload      Alcoholic hepatitis- (present on admission)  Assessment & Plan  D/c prednisolone as the data supporting use in acute alcoholic hepatitis is not robust, and I feel the risks of steroids (delirium, weakness, infections) outweigh the benefits at this time      Hypernatremia- (present on admission)  Assessment & Plan  Improving  Trend q12      Aspiration pneumonitis (HCC)  Assessment & Plan  Completed 5d augmentin      Pancreatitis- (present on admission)  Assessment & Plan  Questionable ?lipase at the outside hospital was 800, however CT abdomen showed no signs of acute pancreatitis   Possibly triggered by alcohol   Repeat lipase here was 412  Monitor    Abdominal distension  Assessment & Plan  No ascites on POCUS    Alcoholic cirrhosis (HCC)  Assessment & Plan  MELD score is 29, indicating 19.6% 3 month mortality  Not a current transplant candidate due to active EtOH abuse    Hemoptysis  Assessment & Plan  Pt was intubated for failure to manage these secretions  No significant blood on bronch  Source of this is was epistaxis    Coagulopathy (HCC)  Assessment & Plan  Due to liver disease  Replete vitamin K    Thrombocytosis (HCC)  Assessment & Plan  Trend    Anemia  Assessment & Plan  Macrocytic, probably d/t liver disease and EtOH  Required transfusions for acute blood loss anemia d/t epistaxis    Hypophonia with hoarseness  Assessment & Plan  Re-evaluate once extubated    Hypophosphatemia- (present on admission)  Assessment & Plan  Severe  Continue to agressively replete    Alcohol withdrawal (HCC)- (present on admission)  Assessment & Plan  Out of withdrawal window  Cont vitamins    Alcohol abuse- (present on admission)  Assessment & Plan  Since the age of 16 years as per the documentation  Pt is actively drinking. 12packs of beer daily and a pint of hard liquor daily.    Last drink was before admission          VTE:  Contraindicated  Ulcer: H2 Antagonist  Lines: Central Line  Ongoing indication  addressed and Sotomayor Catheter  Ongoing indication addressed    I have performed a physical exam and reviewed and updated ROS and Plan today (2021). In review of yesterday's note (2021), there are no changes except as documented above.     Discussed patient condition and risk of morbidity and/or mortality with Family, RN, RT, Pharmacy, Charge nurse / hot rounds, Patient and infectious disease and nephrology     The patient remains critically ill on ventilator, vasopressors infusions.  Critical care time = 35 minutes in directly providing and coordinating critical care and extensive data review.  No time overlap and excludes procedures.    In the afternoon patient's family arrived and made the decision to move to comfort care.  CMO measures put in place and patient  quickly with family at bedside.

## 2021-05-02 NOTE — PALLIATIVE CARE
PALLIATIVE CARE FOLLOW-UP:    Pt's mother and brother Wander awaiting arrival of other brother Bryon in order to transition to comfort care. Family with questions regarding post-mortem process - explained and mortuary list provided with instructions given, including to call [facility] with mortuary choice if they make it once pt has already been placed in morgue. Family verbalzied understanding.    Emotional support, active listening, reflection, and condolences provided during this encounter.    Discussed with/Updated:    Dr. Molina, ICU RN Gwendolyn      Plan:  Transition to comfort care once pt's brother arrives. Palliative Care to continue to follow, provide support, and help facilitate decision-making as needed.    Thank you for allowing Palliative Care to support this pt and his family.  Contact [phone] for additional assistance, patient status change, questions or concerns.

## 2021-05-02 NOTE — PROGRESS NOTES
RN had discussion with patients Mom and Brother, family is requesting to transition patient to comfort care measures. Dr. Molina notified.

## 2021-05-04 LAB
BACTERIA BLD CULT: NORMAL
BACTERIA BLD CULT: NORMAL
SIGNIFICANT IND 70042: NORMAL
SIGNIFICANT IND 70042: NORMAL
SITE SITE: NORMAL
SITE SITE: NORMAL
SOURCE SOURCE: NORMAL
SOURCE SOURCE: NORMAL

## 2021-05-26 LAB
FUNGUS SPEC CULT: NORMAL
SIGNIFICANT IND 70042: NORMAL
SITE SITE: NORMAL
SOURCE SOURCE: NORMAL